# Patient Record
Sex: FEMALE | Race: BLACK OR AFRICAN AMERICAN | Employment: FULL TIME | URBAN - METROPOLITAN AREA
[De-identification: names, ages, dates, MRNs, and addresses within clinical notes are randomized per-mention and may not be internally consistent; named-entity substitution may affect disease eponyms.]

---

## 2018-08-09 ENCOUNTER — TELEPHONE (OUTPATIENT)
Dept: ORTHOPEDIC SURGERY | Age: 53
End: 2018-08-09

## 2018-08-09 ENCOUNTER — OFFICE VISIT (OUTPATIENT)
Dept: ORTHOPEDIC SURGERY | Age: 53
End: 2018-08-09

## 2018-08-09 VITALS
HEIGHT: 66 IN | HEART RATE: 88 BPM | BODY MASS INDEX: 42.59 KG/M2 | DIASTOLIC BLOOD PRESSURE: 74 MMHG | SYSTOLIC BLOOD PRESSURE: 102 MMHG | WEIGHT: 265 LBS

## 2018-08-09 DIAGNOSIS — M17.0 PRIMARY OSTEOARTHRITIS OF BOTH KNEES: Primary | ICD-10-CM

## 2018-08-09 DIAGNOSIS — M22.41 CHONDROMALACIA OF BOTH PATELLAE: ICD-10-CM

## 2018-08-09 DIAGNOSIS — M25.562 PAIN IN BOTH KNEES, UNSPECIFIED CHRONICITY: Primary | ICD-10-CM

## 2018-08-09 DIAGNOSIS — M17.0 PRIMARY OSTEOARTHRITIS OF BOTH KNEES: ICD-10-CM

## 2018-08-09 DIAGNOSIS — M22.42 CHONDROMALACIA OF BOTH PATELLAE: ICD-10-CM

## 2018-08-09 DIAGNOSIS — M25.561 PAIN IN BOTH KNEES, UNSPECIFIED CHRONICITY: Primary | ICD-10-CM

## 2018-08-09 PROCEDURE — G8417 CALC BMI ABV UP PARAM F/U: HCPCS | Performed by: FAMILY MEDICINE

## 2018-08-09 PROCEDURE — L3170 FOOT PLAS HEEL STABI PRE OTS: HCPCS | Performed by: FAMILY MEDICINE

## 2018-08-09 PROCEDURE — 20610 DRAIN/INJ JOINT/BURSA W/O US: CPT | Performed by: FAMILY MEDICINE

## 2018-08-09 PROCEDURE — 99243 OFF/OP CNSLTJ NEW/EST LOW 30: CPT | Performed by: FAMILY MEDICINE

## 2018-08-09 PROCEDURE — G8427 DOCREV CUR MEDS BY ELIG CLIN: HCPCS | Performed by: FAMILY MEDICINE

## 2018-08-09 PROCEDURE — 3017F COLORECTAL CA SCREEN DOC REV: CPT | Performed by: FAMILY MEDICINE

## 2018-08-09 RX ORDER — FLUTICASONE PROPIONATE 50 MCG
2 SPRAY, SUSPENSION (ML) NASAL DAILY
COMMUNITY
Start: 2018-08-01

## 2018-08-09 RX ORDER — HYDROCHLOROTHIAZIDE 12.5 MG/1
12.5 TABLET ORAL DAILY
COMMUNITY
Start: 2018-08-08

## 2018-08-09 RX ORDER — LORATADINE 10 MG/1
TABLET ORAL
Refills: 3 | COMMUNITY
Start: 2018-06-27

## 2018-08-09 RX ORDER — METHYLPREDNISOLONE 4 MG/1
TABLET ORAL
Qty: 21 KIT | Refills: 0 | Status: CANCELLED | OUTPATIENT
Start: 2018-08-09

## 2018-08-09 RX ORDER — LISINOPRIL 10 MG/1
TABLET ORAL
Refills: 1 | COMMUNITY
Start: 2018-07-30

## 2018-08-09 RX ORDER — POTASSIUM CHLORIDE 1500 MG/1
TABLET, FILM COATED, EXTENDED RELEASE ORAL
Refills: 3 | COMMUNITY
Start: 2018-07-30 | End: 2019-01-04 | Stop reason: ALTCHOICE

## 2018-08-09 RX ORDER — DICLOFENAC SODIUM 75 MG/1
75 TABLET, DELAYED RELEASE ORAL 2 TIMES DAILY
Qty: 60 TABLET | Refills: 3 | Status: ON HOLD | OUTPATIENT
Start: 2018-08-09 | End: 2019-01-08 | Stop reason: HOSPADM

## 2018-08-09 RX ORDER — MELOXICAM 15 MG/1
TABLET ORAL
Refills: 0 | COMMUNITY
Start: 2018-08-01 | End: 2019-01-04 | Stop reason: ALTCHOICE

## 2018-08-09 NOTE — TELEPHONE ENCOUNTER
08/09/2018:   TALHA    -  NO PRECERT REQUIRED - PER PRAKASH WITH [facility]  REF #  [id number] NDS/MS    DEDUCTIBLE IN NETWORK INDIVIDUAL $2500, %;    IN NETWORK FAMILY, $5000, MET 50%

## 2018-08-09 NOTE — PROGRESS NOTES
allergies, past medical, surgical, social and family histories were reviewed and updated as appropriate. Review of Systems  Pertinent items are noted in HPI  Review of systems reviewed from Patient History Form dated 8/9/2018  and available in the patient's chart under the Media tab. Vital Signs  Vitals:    08/09/18 1149   BP: 102/74   Pulse: 88       General Exam:     Constitutional: Patient is adequately groomed with no evidence of malnutrition  DTRs: Deep tendon reflexes are intact  Mental Status: The patient is oriented to time, place and person. The patient's mood and affect are appropriate. Lymphatic: The lymphatic examination bilaterally reveals all areas to be without enlargement or induration. Vascular: Examination reveals no swelling or calf tenderness. Peripheral pulses are palpable and 2+. Neurological: The patient has good coordination. There is no weakness or sensory deficit. Knee Examination  Inspection:  She is in mild varus bilaterally. She does have trace knee joint effusions bilaterally. Palpation:  She does have tenderness diffusely along the medial joint line right greater than left. She does have tenderness over the medial and lateral joint line and pain with patellar grind testing. Rang of Motion:  She does lack about 20° active extension on the right and about 5-10° on the left. Flexion limited to about 100 on the right and 1:15 on the left. Strength:  4/5 with flexion and extension. Special Tests:  She does have pain patellar grind testing record of the left pain with crepitation with medial Denver's. No high-grade instability symptoms and a negative screening hip testing. Skin: There are no rashes, ulcerations or lesions. Distal neurovascular exam is intact.     Gait: Mild altalgia    Reflex symmetrically preserved    Additional Comments:     Additional Examinations:  Right Lower Extremity: Examination of the right lower extremity does not show any tenderness, deformity or injury. Range of motion is unremarkable. There is no gross instability. There are no rashes, ulcerations or lesions. Strength and tone are normal.  Left Lower Extremity: Examination of the left lower extremity does not show any tenderness, deformity or injury. Range of motion is unremarkable. There is no gross instability. There are no rashes, ulcerations or lesions. Strength and tone are normal.  Examination of the bilateral hip reveals intact skin. The patient demonstrates full painless range of motion with regards to flexion, abduction, internal and external rotation. There is not tenderness about the greater trochanter. There is a negative straight leg raise against resistance. Strength is 5/5 thorough out all planes. Diagnostic Test Findings: Bilateral knee AP and PA weight-bearing sunrise and lateral films were obtained today and show advanced bone-on-bone changes to the medial compartments bilaterally with patellofemoral arthropathy. She does have fairly prominent subchondral cyst formation on the right to the distal femur and tibia. Assessment :  #1. Progressively worsening right greater than left knee aggravation end-stage medial compartment osteoarthritis with patellofemoral arthropathy and synovitis    Impression:  Encounter Diagnoses   Name Primary?     Pain in both knees, unspecified chronicity Yes    Primary osteoarthritis of both knees     Chondromalacia of both patellae        Office Procedures:  Orders Placed This Encounter   Procedures    XR KNEE RIGHT (MIN 4 VIEWS)    Ambulatory referral to Physical Therapy     Referral Priority:   Routine     Referral Type:   Eval and Treat     Referral Reason:   Specialty Services Required     Requested Specialty:   Physical Therapy     Number of Visits Requested:   1    WI ARTHROCENTESIS ASPIR&/INJ MAJOR JT/BURSA W/O US    WI BETAMETHASONE ACET&SOD PHOSP    Visco K Heel Shoe Inserts     Patient was prescribed a Visco K Heel Shoe Insert. The bilateral feet will require protection / support from this orthosis to improve their function. The orthosis will assist in protecting the affected area, provide functional support and facilitate healing. The patient was educated and fit by a healthcare professional with expert knowledge and specialization in brace application while under the direct supervision of the treating physician. Verbal and written instructions for the use of and application of this item were provided. They were instructed to contact the office immediately should the brace result in increased pain, decreased sensation, increased swelling or worsening of the condition. Treatment Plan:  Treatment options were discussed with Maddison Pryor. We did review her plain films and exam findings. She does have very pronounced medial compartment osteoarthritis as well as patellofemoral arthropathy and is much more symptomatically on the right. We did not inject her knees bilaterally today after discussion using 2 mL of Celestone, 2 of Marcaine, 1 of Xylocaine. She was measured for a medial compartment  for her more symptomatic right knee. I like her to continue with [facility] with Deana Haider PT.  she was changed to diclofenac 75 mg 1 twice daily and was given lateral heel wedges although she may be a candidate for custom orthotics that she is at diet-controlled diabetic. We did give her information regarding Visco supplementation as well. She is aware that she will likely need knee arthroplasty down the road but she is fairly young at 48. See her back in a few weeks for follow-up. Cc:  Mark Esqueda CNP      This dictation was performed with a verbal recognition program (DRAGON) and it was checked for errors. It is possible that there are still dictated errors within this office note.  If so, please bring any errors to my attention

## 2018-08-16 ENCOUNTER — TELEPHONE (OUTPATIENT)
Dept: PHYSICAL THERAPY | Age: 53
End: 2018-08-16

## 2018-08-23 ENCOUNTER — TELEPHONE (OUTPATIENT)
Dept: ORTHOPEDIC SURGERY | Age: 53
End: 2018-08-23

## 2018-08-27 ENCOUNTER — TELEPHONE (OUTPATIENT)
Dept: ORTHOPEDIC SURGERY | Age: 53
End: 2018-08-27

## 2018-09-06 ENCOUNTER — OFFICE VISIT (OUTPATIENT)
Dept: ORTHOPEDIC SURGERY | Age: 53
End: 2018-09-06

## 2018-09-06 VITALS
SYSTOLIC BLOOD PRESSURE: 101 MMHG | BODY MASS INDEX: 40.81 KG/M2 | HEIGHT: 67 IN | HEART RATE: 80 BPM | DIASTOLIC BLOOD PRESSURE: 72 MMHG | WEIGHT: 260 LBS

## 2018-09-06 DIAGNOSIS — M22.42 CHONDROMALACIA OF BOTH PATELLAE: ICD-10-CM

## 2018-09-06 DIAGNOSIS — M22.41 CHONDROMALACIA OF BOTH PATELLAE: ICD-10-CM

## 2018-09-06 DIAGNOSIS — M25.561 PAIN IN BOTH KNEES, UNSPECIFIED CHRONICITY: ICD-10-CM

## 2018-09-06 DIAGNOSIS — M17.0 PRIMARY OSTEOARTHRITIS OF BOTH KNEES: Primary | ICD-10-CM

## 2018-09-06 DIAGNOSIS — M17.0 PRIMARY OSTEOARTHRITIS OF BOTH KNEES: ICD-10-CM

## 2018-09-06 DIAGNOSIS — M25.562 PAIN IN BOTH KNEES, UNSPECIFIED CHRONICITY: ICD-10-CM

## 2018-09-06 PROCEDURE — 1036F TOBACCO NON-USER: CPT | Performed by: FAMILY MEDICINE

## 2018-09-06 PROCEDURE — 99213 OFFICE O/P EST LOW 20 MIN: CPT | Performed by: FAMILY MEDICINE

## 2018-09-06 PROCEDURE — L1845 KO DOUBLE UPRIGHT PRE CST: HCPCS | Performed by: FAMILY MEDICINE

## 2018-09-06 PROCEDURE — G8427 DOCREV CUR MEDS BY ELIG CLIN: HCPCS | Performed by: FAMILY MEDICINE

## 2018-09-06 PROCEDURE — 3017F COLORECTAL CA SCREEN DOC REV: CPT | Performed by: FAMILY MEDICINE

## 2018-09-06 PROCEDURE — G8417 CALC BMI ABV UP PARAM F/U: HCPCS | Performed by: FAMILY MEDICINE

## 2018-09-06 NOTE — PROGRESS NOTES
Procedures    EUFLEXXA INJ (For Auth/Precert)     Bilateral Knees     Standing Status:   Future     Standing Expiration Date:   9/6/2019       Treatment Plan:  Treatment options were discussed with Gabrielle Vasquez. We did review her plain films and exam findings. She does have very pronounced medial compartment osteoarthritis as well as patellofemoral arthropathy and is much more symptomatically on the right. Overall she'll and temporary improvement following her steroid injection. We did have a ryne discussion regarding continued conservative treatment versus potential operative intervention. Of concern is that she is fairly young for knee arthroplasty and that trying at least one round of visco supplementation with Euflexxa to her knees bilaterally would likely be advisable as she continues with her therapy exercises. We try to fit her in a medial compartment  however was too small and will need to be reordered. We will try this on the right initially. She will continue with her diclofenac 75 mg 1 pill twice daily with a paternal referral for custom orthotics with lateral posting as she is a diet-controlled diabetic. I will see her back after a few weeks to consider Euflexxa. Icing and activity modification was discussed. She may utilize her previous lateral heel wedges until she gets her custom orthotics. We'll see her back in a few weeks for follow-up. Cc:  Shaila Gilliam CNP      This dictation was performed with a verbal recognition program (DRAGON) and it was checked for errors. It is possible that there are still dictated errors within this office note. If so, please bring any errors to my attention for an addendum. All efforts were made to ensure that this office note is accurate.

## 2018-09-10 ENCOUNTER — TELEPHONE (OUTPATIENT)
Dept: ORTHOPEDIC SURGERY | Age: 53
End: 2018-09-10

## 2018-09-27 ENCOUNTER — OFFICE VISIT (OUTPATIENT)
Dept: ORTHOPEDIC SURGERY | Age: 53
End: 2018-09-27
Payer: COMMERCIAL

## 2018-09-27 VITALS — HEIGHT: 67 IN | WEIGHT: 260 LBS | BODY MASS INDEX: 40.81 KG/M2

## 2018-09-27 DIAGNOSIS — M17.0 PRIMARY OSTEOARTHRITIS OF BOTH KNEES: Primary | ICD-10-CM

## 2018-09-27 DIAGNOSIS — M25.561 PAIN IN BOTH KNEES, UNSPECIFIED CHRONICITY: ICD-10-CM

## 2018-09-27 DIAGNOSIS — M22.41 CHONDROMALACIA OF BOTH PATELLAE: ICD-10-CM

## 2018-09-27 DIAGNOSIS — M22.42 CHONDROMALACIA OF BOTH PATELLAE: ICD-10-CM

## 2018-09-27 DIAGNOSIS — M25.562 PAIN IN BOTH KNEES, UNSPECIFIED CHRONICITY: ICD-10-CM

## 2018-09-27 PROCEDURE — G8427 DOCREV CUR MEDS BY ELIG CLIN: HCPCS | Performed by: FAMILY MEDICINE

## 2018-09-27 PROCEDURE — 99213 OFFICE O/P EST LOW 20 MIN: CPT | Performed by: FAMILY MEDICINE

## 2018-09-27 PROCEDURE — G8417 CALC BMI ABV UP PARAM F/U: HCPCS | Performed by: FAMILY MEDICINE

## 2018-09-27 PROCEDURE — 1036F TOBACCO NON-USER: CPT | Performed by: FAMILY MEDICINE

## 2018-09-27 PROCEDURE — 3017F COLORECTAL CA SCREEN DOC REV: CPT | Performed by: FAMILY MEDICINE

## 2018-09-27 PROCEDURE — 20610 DRAIN/INJ JOINT/BURSA W/O US: CPT | Performed by: FAMILY MEDICINE

## 2018-09-27 NOTE — PROGRESS NOTES
be without enlargement or induration. Vascular: Examination reveals no swelling or calf tenderness. Peripheral pulses are palpable and 2+. Neurological: The patient has good coordination. There is no weakness or sensory deficit. Knee Examination  Inspection:  She is in mild varus bilaterally. She does have trace knee joint effusions bilaterally. Palpation:  She does have ongoing tenderness diffusely along the medial joint line right greater than left. She does have tenderness over the medial and lateral joint line and pain with patellar grind testing. Rang of Motion:  She does lack about 15° active extension on the right and about 5-10° on the left. Flexion limited to about 100 on the right and 115 on the left. Strength:  4/5 with flexion and extension. Special Tests:  She does have ongoing pain patellar grind testing slightly improved pain on the left pain with crepitation with medial Denver's. No high-grade instability symptoms and a negative screening hip testing. Skin: There are no rashes, ulcerations or lesions. Distal neurovascular exam is intact. Gait: Mild altalgia    Reflex symmetrically preserved    Additional Comments:     Additional Examinations:  Right Lower Extremity: Examination of the right lower extremity does not show any tenderness, deformity or injury. Range of motion is unremarkable. There is no gross instability. There are no rashes, ulcerations or lesions. Strength and tone are normal.  Left Lower Extremity: Examination of the left lower extremity does not show any tenderness, deformity or injury. Range of motion is unremarkable. There is no gross instability. There are no rashes, ulcerations or lesions. Strength and tone are normal.  Examination of the bilateral hip reveals intact skin. The patient demonstrates full painless range of motion with regards to flexion, abduction, internal and external rotation.   There is not tenderness about the greater recognition program [facility]) and it was checked for errors. It is possible that there are still dictated errors within this office note. If so, please bring any errors to my attention for an addendum. All efforts were made to ensure that this office note is accurate.

## 2018-10-04 ENCOUNTER — OFFICE VISIT (OUTPATIENT)
Dept: ORTHOPEDIC SURGERY | Age: 53
End: 2018-10-04
Payer: COMMERCIAL

## 2018-10-04 VITALS — HEART RATE: 72 BPM | SYSTOLIC BLOOD PRESSURE: 133 MMHG | DIASTOLIC BLOOD PRESSURE: 84 MMHG

## 2018-10-04 DIAGNOSIS — M22.42 CHONDROMALACIA OF BOTH PATELLAE: ICD-10-CM

## 2018-10-04 DIAGNOSIS — M25.561 PAIN IN BOTH KNEES, UNSPECIFIED CHRONICITY: ICD-10-CM

## 2018-10-04 DIAGNOSIS — M22.41 CHONDROMALACIA OF BOTH PATELLAE: ICD-10-CM

## 2018-10-04 DIAGNOSIS — M17.0 PRIMARY OSTEOARTHRITIS OF BOTH KNEES: Primary | ICD-10-CM

## 2018-10-04 DIAGNOSIS — M25.562 PAIN IN BOTH KNEES, UNSPECIFIED CHRONICITY: ICD-10-CM

## 2018-10-04 PROCEDURE — 20610 DRAIN/INJ JOINT/BURSA W/O US: CPT | Performed by: FAMILY MEDICINE

## 2018-10-04 PROCEDURE — 99999 PR OFFICE/OUTPT VISIT,PROCEDURE ONLY: CPT | Performed by: FAMILY MEDICINE

## 2018-10-11 ENCOUNTER — OFFICE VISIT (OUTPATIENT)
Dept: ORTHOPEDIC SURGERY | Age: 53
End: 2018-10-11
Payer: COMMERCIAL

## 2018-10-11 DIAGNOSIS — M22.41 CHONDROMALACIA OF BOTH PATELLAE: ICD-10-CM

## 2018-10-11 DIAGNOSIS — M25.562 PAIN IN BOTH KNEES, UNSPECIFIED CHRONICITY: ICD-10-CM

## 2018-10-11 DIAGNOSIS — M17.0 PRIMARY OSTEOARTHRITIS OF BOTH KNEES: Primary | ICD-10-CM

## 2018-10-11 DIAGNOSIS — M25.561 PAIN IN BOTH KNEES, UNSPECIFIED CHRONICITY: ICD-10-CM

## 2018-10-11 DIAGNOSIS — M22.42 CHONDROMALACIA OF BOTH PATELLAE: ICD-10-CM

## 2018-10-11 PROCEDURE — 99999 PR OFFICE/OUTPT VISIT,PROCEDURE ONLY: CPT | Performed by: FAMILY MEDICINE

## 2018-10-11 PROCEDURE — 20610 DRAIN/INJ JOINT/BURSA W/O US: CPT | Performed by: FAMILY MEDICINE

## 2018-10-15 ENCOUNTER — TELEPHONE (OUTPATIENT)
Dept: ORTHOPEDIC SURGERY | Age: 53
End: 2018-10-15

## 2018-11-02 DIAGNOSIS — M22.42 CHONDROMALACIA OF BOTH PATELLAE: ICD-10-CM

## 2018-11-02 DIAGNOSIS — M25.562 PAIN IN BOTH KNEES, UNSPECIFIED CHRONICITY: ICD-10-CM

## 2018-11-02 DIAGNOSIS — M17.0 PRIMARY OSTEOARTHRITIS OF BOTH KNEES: Primary | ICD-10-CM

## 2018-11-02 DIAGNOSIS — M25.561 PAIN IN BOTH KNEES, UNSPECIFIED CHRONICITY: ICD-10-CM

## 2018-11-02 DIAGNOSIS — M22.41 CHONDROMALACIA OF BOTH PATELLAE: ICD-10-CM

## 2018-11-02 RX ORDER — METHYLPREDNISOLONE 4 MG/1
TABLET ORAL
Qty: 21 KIT | Refills: 0 | Status: SHIPPED | OUTPATIENT
Start: 2018-11-02 | End: 2019-01-04 | Stop reason: ALTCHOICE

## 2018-11-20 DIAGNOSIS — M22.41 CHONDROMALACIA OF BOTH PATELLAE: ICD-10-CM

## 2018-11-20 DIAGNOSIS — M25.562 PAIN IN BOTH KNEES, UNSPECIFIED CHRONICITY: ICD-10-CM

## 2018-11-20 DIAGNOSIS — M17.0 PRIMARY OSTEOARTHRITIS OF BOTH KNEES: Primary | ICD-10-CM

## 2018-11-20 DIAGNOSIS — M22.42 CHONDROMALACIA OF BOTH PATELLAE: ICD-10-CM

## 2018-11-20 DIAGNOSIS — M25.561 PAIN IN BOTH KNEES, UNSPECIFIED CHRONICITY: ICD-10-CM

## 2018-12-03 ENCOUNTER — TELEPHONE (OUTPATIENT)
Dept: ORTHOPEDIC SURGERY | Age: 53
End: 2018-12-03

## 2018-12-06 ENCOUNTER — OFFICE VISIT (OUTPATIENT)
Dept: ORTHOPEDIC SURGERY | Age: 53
End: 2018-12-06
Payer: COMMERCIAL

## 2018-12-06 VITALS
HEIGHT: 67 IN | SYSTOLIC BLOOD PRESSURE: 128 MMHG | BODY MASS INDEX: 40.49 KG/M2 | HEART RATE: 83 BPM | WEIGHT: 258 LBS | DIASTOLIC BLOOD PRESSURE: 86 MMHG

## 2018-12-06 DIAGNOSIS — M22.42 CHONDROMALACIA OF BOTH PATELLAE: ICD-10-CM

## 2018-12-06 DIAGNOSIS — M17.0 PRIMARY OSTEOARTHRITIS OF BOTH KNEES: Primary | ICD-10-CM

## 2018-12-06 DIAGNOSIS — M25.561 PAIN IN BOTH KNEES, UNSPECIFIED CHRONICITY: ICD-10-CM

## 2018-12-06 DIAGNOSIS — M22.41 CHONDROMALACIA OF BOTH PATELLAE: ICD-10-CM

## 2018-12-06 DIAGNOSIS — M25.562 PAIN IN BOTH KNEES, UNSPECIFIED CHRONICITY: ICD-10-CM

## 2018-12-06 PROCEDURE — 1036F TOBACCO NON-USER: CPT | Performed by: FAMILY MEDICINE

## 2018-12-06 PROCEDURE — G8427 DOCREV CUR MEDS BY ELIG CLIN: HCPCS | Performed by: FAMILY MEDICINE

## 2018-12-06 PROCEDURE — E0100 CANE ADJUST/FIXED WITH TIP: HCPCS | Performed by: FAMILY MEDICINE

## 2018-12-06 PROCEDURE — 99213 OFFICE O/P EST LOW 20 MIN: CPT | Performed by: FAMILY MEDICINE

## 2018-12-06 PROCEDURE — 3017F COLORECTAL CA SCREEN DOC REV: CPT | Performed by: FAMILY MEDICINE

## 2018-12-06 PROCEDURE — G8417 CALC BMI ABV UP PARAM F/U: HCPCS | Performed by: FAMILY MEDICINE

## 2018-12-06 PROCEDURE — G8484 FLU IMMUNIZE NO ADMIN: HCPCS | Performed by: FAMILY MEDICINE

## 2018-12-06 RX ORDER — TRAMADOL HYDROCHLORIDE 50 MG/1
50 TABLET ORAL EVERY 12 HOURS PRN
Qty: 14 TABLET | Refills: 0 | Status: SHIPPED | OUTPATIENT
Start: 2018-12-06 | End: 2018-12-13

## 2018-12-08 NOTE — PROGRESS NOTES
Chief Complaint  Knee Pain (Bilateral Knee pain)       Follow-up symptomatic right greater than left knee advanced medial compartment osteoarthritis with patellofemoral arthropathy and persistent synovitis status post completion of bilateral knee Euflexxa 10/11/2018     History of Present Illness:  Ita Angeles is a 48 y.o. 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 She states that she has been having progressively worsening pain over the past 18 months or so to her knees right greater than left. There is no history of injury or new activity prior to becoming symptomatic. This is a strong family history of arthritis in both her mom and her brother who have had to have bilateral knee replacements in their 46s. There is no history of actual injury and activity prior to becoming increasingly symptomatic. Prolonged standing and walking greater than 4-5 hours will cause worsening pain as is going up and down inclines. She is unable to squat and kneel and has noticed progressive motion deficits developing. She initially tried icing and ibuprofen but was recently started in physical therapy and has been tried on what sounds like Naprosyn and Motrin and meloxicam with limited effectiveness. She has had symptoms of pseudo-buckling popping and crepitation not really complaining of active locking or catching. For the most part her pain is achy in the range of 3-4-10 but can be much more sharp at 7-8 out of 10 with distance walking going up and downstairs. She does have occasional night pain. She recently saw her nurse practitioner who recommended therapy which she just started about a week ago.   She reports no substantial back pain groin pain or radicular symptoms and has

## 2018-12-12 ENCOUNTER — OFFICE VISIT (OUTPATIENT)
Dept: ORTHOPEDIC SURGERY | Age: 53
End: 2018-12-12
Payer: COMMERCIAL

## 2018-12-12 VITALS — HEART RATE: 79 BPM | SYSTOLIC BLOOD PRESSURE: 127 MMHG | DIASTOLIC BLOOD PRESSURE: 93 MMHG

## 2018-12-12 DIAGNOSIS — M17.11 PRIMARY OSTEOARTHRITIS OF RIGHT KNEE: Primary | ICD-10-CM

## 2018-12-12 DIAGNOSIS — M17.12 PRIMARY OSTEOARTHRITIS OF LEFT KNEE: ICD-10-CM

## 2018-12-12 DIAGNOSIS — Z01.818 PRE-OP TESTING: ICD-10-CM

## 2018-12-12 PROCEDURE — 99243 OFF/OP CNSLTJ NEW/EST LOW 30: CPT | Performed by: ORTHOPAEDIC SURGERY

## 2018-12-12 PROCEDURE — G8427 DOCREV CUR MEDS BY ELIG CLIN: HCPCS | Performed by: ORTHOPAEDIC SURGERY

## 2018-12-12 PROCEDURE — G8417 CALC BMI ABV UP PARAM F/U: HCPCS | Performed by: ORTHOPAEDIC SURGERY

## 2018-12-12 PROCEDURE — 3017F COLORECTAL CA SCREEN DOC REV: CPT | Performed by: ORTHOPAEDIC SURGERY

## 2018-12-12 PROCEDURE — G8484 FLU IMMUNIZE NO ADMIN: HCPCS | Performed by: ORTHOPAEDIC SURGERY

## 2018-12-12 RX ORDER — HYDROCODONE BITARTRATE AND ACETAMINOPHEN 5; 325 MG/1; MG/1
1 TABLET ORAL 2 TIMES DAILY PRN
Qty: 20 TABLET | Refills: 0 | Status: SHIPPED | OUTPATIENT
Start: 2018-12-12 | End: 2018-12-24 | Stop reason: SDUPTHER

## 2018-12-12 NOTE — PROGRESS NOTES
Zayda Mcclure  [id number]  December 12, 2018    Chief Complaint   Patient presents with    Knee Pain     bilateral knee pain, R > L       History: The patient is a 60-year-old female here today for evaluation regarding her bilateral, right greater than left knee pain. The patient has been under the care of Dr. Karlee Lewis for the last few months. She has tried Euflexxa injections, steroid injections, Motrin, naproxen, meloxicam, and a formal therapy program without much improvement of her knee pain. She works as an STNA on third shift. She is on her feet for much of her day. She has found that this knee pain has become right progressive and severe. It limits her ability to perform her normal daily functions. She rates her right knee pain 9/10 today in her left knee pain 6/10 today. She has been using tramadol without much relief recently. This is a consult for definitive surgical treatment for her bilateral knee pain from Dr. Nat Lockwood. The patient's  past medical history, medications, allergies,  family history, social history, and have been reviewed, and dated and are recorded in the chart. Pertinent items are noted in HPI. Review of systems reviewed from Pertinent History Form is available in the patient's chart under the Media tab. Vitals:  BP (!) 127/93   Pulse 79     Physical: Ms. Zayda Mcclure appears well, she is in no apparent distress, she demonstrates appropriate mood & affect. She is alert and oriented to person, place and time. Examination of the right lower extremity reveals no pain with range of motion of the hip. She has generalized tenderness to palpation about the joint line of the right knee. Range of motion is from -7 degrees to 110 degrees. Strength is 4+ to 5/5 for all muscle groups about the right knee. There is patellofemoral crepitus with range of motion of the right knee. Varus and valgus stressing of the knees reveals no evidence of instability.  There is a small effusion in the right knee. Anterior drawer and Lachman are negative bilaterally. Examination of the skin reveals no rashes, ulceration, or lesion, bilaterally in the lower extremities. Sensation to both lower extremities is grossly intact. Exam of both feet reveals pedal pulses intact and brisk cap refill. Patient is able to dorsiflex and wiggle all toes. Deep tendon reflexes of the lower extremities are normal and symmetric. Imagin views of the right knee obtained on 18 reviewed in the office today. There is evidence that this, and stage osteoarthritis of the right knee and there is complete loss of medial joint compartment with bone-on-bone changes medially and within the patellofemoral compartment. There are cystic changes noted as well. Regressive the left knee obtained previously were nonweightbearing and show at least moderate to severe osteoarthritis of the left knee. Impression: #1 bilateral knee osteoarthritis    Plan: At this time, the patient will be scheduled for a right total knee arthroplasty. All risks including but not limited to blood loss, infection, persistent pain,stiffness, weakness,loosening,deep vein thrombosis,neurovascular injury, and the risks of anesthesia were discussed. The patient understands all risks and benefits of the procedure and agrees to proceed. The patient will see her primary care Chema Loza RN, for medical clearance. If the patient is on NSAIDS or blood thinners these medications will need to be discontinued one week prior to surgery. She was given a small prescription for Norco.  Will plan on 81mg ASA BID for 14 days post-op.

## 2018-12-13 ENCOUNTER — TELEPHONE (OUTPATIENT)
Dept: ORTHOPEDIC SURGERY | Age: 53
End: 2018-12-13

## 2018-12-13 NOTE — TELEPHONE ENCOUNTER
Patient called back requesting that work letter be faxed to her employer and mailed to her home.     Fax: [phone]

## 2018-12-19 ENCOUNTER — PREP FOR PROCEDURE (OUTPATIENT)
Dept: ORTHOPEDIC SURGERY | Age: 53
End: 2018-12-19

## 2018-12-19 ENCOUNTER — TELEPHONE (OUTPATIENT)
Dept: ORTHOPEDIC SURGERY | Age: 53
End: 2018-12-19

## 2018-12-19 DIAGNOSIS — M17.11 PRIMARY OSTEOARTHRITIS OF RIGHT KNEE: Primary | ICD-10-CM

## 2018-12-20 ENCOUNTER — PREP FOR PROCEDURE (OUTPATIENT)
Dept: ORTHOPEDICS UNIT | Age: 53
End: 2018-12-20

## 2018-12-21 RX ORDER — CELECOXIB 200 MG/1
400 CAPSULE ORAL ONCE
Status: CANCELLED | OUTPATIENT
Start: 2018-12-21 | End: 2018-12-21

## 2018-12-24 ENCOUNTER — TELEPHONE (OUTPATIENT)
Dept: ORTHOPEDIC SURGERY | Age: 53
End: 2018-12-24

## 2018-12-24 DIAGNOSIS — M17.11 PRIMARY OSTEOARTHRITIS OF RIGHT KNEE: ICD-10-CM

## 2018-12-24 RX ORDER — HYDROCODONE BITARTRATE AND ACETAMINOPHEN 5; 325 MG/1; MG/1
1 TABLET ORAL 2 TIMES DAILY PRN
Qty: 30 TABLET | Refills: 0 | Status: SHIPPED | OUTPATIENT
Start: 2018-12-24 | End: 2019-01-04 | Stop reason: ALTCHOICE

## 2018-12-24 NOTE — LETTER
[facility]  [address] [place] [address]  Phone: [phone]  Fax: [phone]    Kathleen Yepez MD        December 24, 2018     Patient: Summer Heart   YOB: 1965   Date of Visit: 12/24/2018       To Whom It May Concern: It is my medical opinion that Otto Meeks was off work from 12/22/18 until 12/26/18    If you have any questions or concerns, please don't hesitate to call.     Sincerely,        Kathleen Yepez MD

## 2018-12-24 NOTE — TELEPHONE ENCOUNTER
She was given a refill - last prior to surgery. She can have note to remain out of work untill surgery.

## 2018-12-26 ENCOUNTER — TELEPHONE (OUTPATIENT)
Dept: ORTHOPEDIC SURGERY | Age: 53
End: 2018-12-26

## 2018-12-26 ENCOUNTER — HOSPITAL ENCOUNTER (OUTPATIENT)
Dept: PREADMISSION TESTING | Age: 53
Discharge: HOME | End: 2018-12-30
Payer: COMMERCIAL

## 2018-12-26 DIAGNOSIS — Z01.818 PRE-OP TESTING: ICD-10-CM

## 2018-12-26 DIAGNOSIS — M17.11 PRIMARY OSTEOARTHRITIS OF RIGHT KNEE: ICD-10-CM

## 2018-12-26 LAB
ABO/RH: NORMAL
ALBUMIN SERPL-MCNC: 4.1 G/DL (ref 3.4–5)
ANION GAP SERPL CALCULATED.3IONS-SCNC: 11 MMOL/L (ref 3–16)
ANTIBODY SCREEN: NORMAL
BASOPHILS ABSOLUTE: 0 K/UL (ref 0–0.2)
BASOPHILS RELATIVE PERCENT: 0.3 %
BILIRUBIN URINE: NEGATIVE
BLOOD, URINE: NEGATIVE
BUN BLDV-MCNC: 16 MG/DL (ref 7–20)
CALCIUM SERPL-MCNC: 9.7 MG/DL (ref 8.3–10.6)
CHLORIDE BLD-SCNC: 103 MMOL/L (ref 99–110)
CLARITY: CLEAR
CO2: 27 MMOL/L (ref 21–32)
COLOR: YELLOW
CREAT SERPL-MCNC: 0.9 MG/DL (ref 0.6–1.1)
EOSINOPHILS ABSOLUTE: 0.1 K/UL (ref 0–0.6)
EOSINOPHILS RELATIVE PERCENT: 2.8 %
ESTIMATED AVERAGE GLUCOSE: 116.9 MG/DL
GFR AFRICAN AMERICAN: >60
GFR NON-AFRICAN AMERICAN: >60
GLUCOSE BLD-MCNC: 81 MG/DL (ref 70–99)
GLUCOSE URINE: NEGATIVE MG/DL
HBA1C MFR BLD: 5.7 %
HCT VFR BLD CALC: 37 % (ref 36–48)
HEMOGLOBIN: 12.1 G/DL (ref 12–16)
INR BLD: 1.11 (ref 0.86–1.14)
KETONES, URINE: NEGATIVE MG/DL
LEUKOCYTE ESTERASE, URINE: NEGATIVE
LYMPHOCYTES ABSOLUTE: 1.5 K/UL (ref 1–5.1)
LYMPHOCYTES RELATIVE PERCENT: 43 %
MCH RBC QN AUTO: 28.9 PG (ref 26–34)
MCHC RBC AUTO-ENTMCNC: 32.8 G/DL (ref 31–36)
MCV RBC AUTO: 88.1 FL (ref 80–100)
MICROSCOPIC EXAMINATION: NORMAL
MONOCYTES ABSOLUTE: 0.3 K/UL (ref 0–1.3)
MONOCYTES RELATIVE PERCENT: 9.2 %
NEUTROPHILS ABSOLUTE: 1.6 K/UL (ref 1.7–7.7)
NEUTROPHILS RELATIVE PERCENT: 44.7 %
NITRITE, URINE: NEGATIVE
PDW BLD-RTO: 13.8 % (ref 12.4–15.4)
PH UA: 5.5
PLATELET # BLD: 245 K/UL (ref 135–450)
PMV BLD AUTO: 8.3 FL (ref 5–10.5)
POTASSIUM SERPL-SCNC: 3.7 MMOL/L (ref 3.5–5.1)
PROTEIN UA: NEGATIVE MG/DL
PROTHROMBIN TIME: 12.6 SEC (ref 9.8–13)
RBC # BLD: 4.2 M/UL (ref 4–5.2)
SODIUM BLD-SCNC: 141 MMOL/L (ref 136–145)
SPECIFIC GRAVITY UA: 1.02
URINE REFLEX TO CULTURE: NORMAL
URINE TYPE: NORMAL
UROBILINOGEN, URINE: 0.2 E.U./DL
WBC # BLD: 3.6 K/UL (ref 4–11)

## 2018-12-26 PROCEDURE — 86900 BLOOD TYPING SEROLOGIC ABO: CPT

## 2018-12-26 PROCEDURE — 93005 ELECTROCARDIOGRAM TRACING: CPT

## 2018-12-26 PROCEDURE — 83036 HEMOGLOBIN GLYCOSYLATED A1C: CPT

## 2018-12-26 PROCEDURE — 85025 COMPLETE CBC W/AUTO DIFF WBC: CPT

## 2018-12-26 PROCEDURE — 82040 ASSAY OF SERUM ALBUMIN: CPT

## 2018-12-26 PROCEDURE — 85610 PROTHROMBIN TIME: CPT

## 2018-12-26 PROCEDURE — 80048 BASIC METABOLIC PNL TOTAL CA: CPT

## 2018-12-26 PROCEDURE — 81003 URINALYSIS AUTO W/O SCOPE: CPT

## 2018-12-26 PROCEDURE — 86850 RBC ANTIBODY SCREEN: CPT

## 2018-12-26 PROCEDURE — 87641 MR-STAPH DNA AMP PROBE: CPT

## 2018-12-26 PROCEDURE — 86901 BLOOD TYPING SEROLOGIC RH(D): CPT

## 2018-12-26 NOTE — TELEPHONE ENCOUNTER
I spoke with patient she wanted to be off work until after the surgery. A new notes was faxed to her work miguel her off work until 4/8/19 estimated .

## 2018-12-27 LAB
EKG ATRIAL RATE: 70 BPM
EKG DIAGNOSIS: NORMAL
EKG P AXIS: 45 DEGREES
EKG P-R INTERVAL: 176 MS
EKG Q-T INTERVAL: 396 MS
EKG QRS DURATION: 82 MS
EKG QTC CALCULATION (BAZETT): 427 MS
EKG R AXIS: 21 DEGREES
EKG T AXIS: 34 DEGREES
EKG VENTRICULAR RATE: 70 BPM
MRSA SCREEN RT-PCR: NORMAL

## 2019-01-03 ENCOUNTER — ANESTHESIA EVENT (OUTPATIENT)
Dept: OPERATING ROOM | Age: 54
DRG: 470 | End: 2019-01-03
Payer: COMMERCIAL

## 2019-01-04 RX ORDER — FERROUS SULFATE 325(65) MG
325 TABLET ORAL DAILY
COMMUNITY
End: 2021-09-29 | Stop reason: ALTCHOICE

## 2019-01-04 RX ORDER — RANITIDINE 150 MG/1
150 TABLET ORAL DAILY
COMMUNITY
Start: 2018-03-13 | End: 2021-09-29 | Stop reason: ALTCHOICE

## 2019-01-08 ENCOUNTER — ANESTHESIA (OUTPATIENT)
Dept: OPERATING ROOM | Age: 54
DRG: 470 | End: 2019-01-08
Payer: COMMERCIAL

## 2019-01-08 ENCOUNTER — HOSPITAL ENCOUNTER (INPATIENT)
Age: 54
LOS: 1 days | Discharge: NURSING, REHAB OR LONG-TERM CARE | DRG: 470 | End: 2019-01-09
Attending: ORTHOPAEDIC SURGERY | Admitting: ORTHOPAEDIC SURGERY
Payer: COMMERCIAL

## 2019-01-08 ENCOUNTER — APPOINTMENT (OUTPATIENT)
Dept: GENERAL RADIOLOGY | Age: 54
DRG: 470 | End: 2019-01-08
Attending: ORTHOPAEDIC SURGERY
Payer: COMMERCIAL

## 2019-01-08 VITALS
OXYGEN SATURATION: 97 % | TEMPERATURE: 97.5 F | SYSTOLIC BLOOD PRESSURE: 120 MMHG | DIASTOLIC BLOOD PRESSURE: 86 MMHG | RESPIRATION RATE: 16 BRPM

## 2019-01-08 DIAGNOSIS — M17.11 PRIMARY OSTEOARTHRITIS OF RIGHT KNEE: Primary | ICD-10-CM

## 2019-01-08 LAB
ABO/RH: NORMAL
ANTIBODY SCREEN: NORMAL
GLUCOSE BLD-MCNC: 123 MG/DL (ref 70–99)
GLUCOSE BLD-MCNC: 182 MG/DL (ref 70–99)
HCT VFR BLD CALC: 34.9 % (ref 36–48)
HEMOGLOBIN: 11.3 G/DL (ref 12–16)
PERFORMED ON: ABNORMAL
PERFORMED ON: ABNORMAL

## 2019-01-08 PROCEDURE — 73560 X-RAY EXAM OF KNEE 1 OR 2: CPT

## 2019-01-08 PROCEDURE — 2500000003 HC RX 250 WO HCPCS: Performed by: NURSE ANESTHETIST, CERTIFIED REGISTERED

## 2019-01-08 PROCEDURE — 3700000001 HC ADD 15 MINUTES (ANESTHESIA): Performed by: ORTHOPAEDIC SURGERY

## 2019-01-08 PROCEDURE — 6360000002 HC RX W HCPCS: Performed by: NURSE PRACTITIONER

## 2019-01-08 PROCEDURE — 2500000003 HC RX 250 WO HCPCS: Performed by: ORTHOPAEDIC SURGERY

## 2019-01-08 PROCEDURE — 7100000000 HC PACU RECOVERY - FIRST 15 MIN: Performed by: ORTHOPAEDIC SURGERY

## 2019-01-08 PROCEDURE — 7100000010 HC PHASE II RECOVERY - FIRST 15 MIN: Performed by: ORTHOPAEDIC SURGERY

## 2019-01-08 PROCEDURE — 36415 COLL VENOUS BLD VENIPUNCTURE: CPT

## 2019-01-08 PROCEDURE — 6370000000 HC RX 637 (ALT 250 FOR IP): Performed by: NURSE PRACTITIONER

## 2019-01-08 PROCEDURE — 3600000015 HC SURGERY LEVEL 5 ADDTL 15MIN: Performed by: ORTHOPAEDIC SURGERY

## 2019-01-08 PROCEDURE — G8988 SELF CARE GOAL STATUS: HCPCS

## 2019-01-08 PROCEDURE — G8978 MOBILITY CURRENT STATUS: HCPCS

## 2019-01-08 PROCEDURE — S0028 INJECTION, FAMOTIDINE, 20 MG: HCPCS | Performed by: NURSE PRACTITIONER

## 2019-01-08 PROCEDURE — 97166 OT EVAL MOD COMPLEX 45 MIN: CPT

## 2019-01-08 PROCEDURE — C1713 ANCHOR/SCREW BN/BN,TIS/BN: HCPCS | Performed by: ORTHOPAEDIC SURGERY

## 2019-01-08 PROCEDURE — 2580000003 HC RX 258: Performed by: NURSE PRACTITIONER

## 2019-01-08 PROCEDURE — 97162 PT EVAL MOD COMPLEX 30 MIN: CPT

## 2019-01-08 PROCEDURE — 7100000001 HC PACU RECOVERY - ADDTL 15 MIN: Performed by: ORTHOPAEDIC SURGERY

## 2019-01-08 PROCEDURE — 6370000000 HC RX 637 (ALT 250 FOR IP): Performed by: ORTHOPAEDIC SURGERY

## 2019-01-08 PROCEDURE — 2580000003 HC RX 258: Performed by: ORTHOPAEDIC SURGERY

## 2019-01-08 PROCEDURE — 97535 SELF CARE MNGMENT TRAINING: CPT

## 2019-01-08 PROCEDURE — 0SRC0J9 REPLACEMENT OF RIGHT KNEE JOINT WITH SYNTHETIC SUBSTITUTE, CEMENTED, OPEN APPROACH: ICD-10-PCS | Performed by: ORTHOPAEDIC SURGERY

## 2019-01-08 PROCEDURE — 1200000000 HC SEMI PRIVATE

## 2019-01-08 PROCEDURE — 85014 HEMATOCRIT: CPT

## 2019-01-08 PROCEDURE — 86850 RBC ANTIBODY SCREEN: CPT

## 2019-01-08 PROCEDURE — 6360000002 HC RX W HCPCS: Performed by: ANESTHESIOLOGY

## 2019-01-08 PROCEDURE — 88305 TISSUE EXAM BY PATHOLOGIST: CPT

## 2019-01-08 PROCEDURE — 7100000011 HC PHASE II RECOVERY - ADDTL 15 MIN: Performed by: ORTHOPAEDIC SURGERY

## 2019-01-08 PROCEDURE — 83036 HEMOGLOBIN GLYCOSYLATED A1C: CPT

## 2019-01-08 PROCEDURE — 86900 BLOOD TYPING SEROLOGIC ABO: CPT

## 2019-01-08 PROCEDURE — 6360000002 HC RX W HCPCS: Performed by: NURSE ANESTHETIST, CERTIFIED REGISTERED

## 2019-01-08 PROCEDURE — G8979 MOBILITY GOAL STATUS: HCPCS

## 2019-01-08 PROCEDURE — 2720000010 HC SURG SUPPLY STERILE: Performed by: ORTHOPAEDIC SURGERY

## 2019-01-08 PROCEDURE — 2580000003 HC RX 258: Performed by: ANESTHESIOLOGY

## 2019-01-08 PROCEDURE — 2709999900 HC NON-CHARGEABLE SUPPLY: Performed by: ORTHOPAEDIC SURGERY

## 2019-01-08 PROCEDURE — G8987 SELF CARE CURRENT STATUS: HCPCS

## 2019-01-08 PROCEDURE — 88311 DECALCIFY TISSUE: CPT

## 2019-01-08 PROCEDURE — 86901 BLOOD TYPING SEROLOGIC RH(D): CPT

## 2019-01-08 PROCEDURE — 97530 THERAPEUTIC ACTIVITIES: CPT

## 2019-01-08 PROCEDURE — 6360000002 HC RX W HCPCS: Performed by: ORTHOPAEDIC SURGERY

## 2019-01-08 PROCEDURE — 3600000005 HC SURGERY LEVEL 5 BASE: Performed by: ORTHOPAEDIC SURGERY

## 2019-01-08 PROCEDURE — 3700000000 HC ANESTHESIA ATTENDED CARE: Performed by: ORTHOPAEDIC SURGERY

## 2019-01-08 PROCEDURE — C1776 JOINT DEVICE (IMPLANTABLE): HCPCS | Performed by: ORTHOPAEDIC SURGERY

## 2019-01-08 PROCEDURE — 85018 HEMOGLOBIN: CPT

## 2019-01-08 DEVICE — PSN TIB STM 5 DEG SZ E R: Type: IMPLANTABLE DEVICE | Site: KNEE | Status: FUNCTIONAL

## 2019-01-08 DEVICE — COMPONENT FEM SZ 9 R KNEE CO CHROM NAR POST STBL CEM: Type: IMPLANTABLE DEVICE | Site: KNEE | Status: FUNCTIONAL

## 2019-01-08 DEVICE — COMPONENT ARTC SURF PS 6-9 EF 10 MM RT TIB KNEE VIVACIT-E: Type: IMPLANTABLE DEVICE | Site: KNEE | Status: FUNCTIONAL

## 2019-01-08 DEVICE — CEMENT BNE 40GM HI VISC RADPQ FOR REV SURG: Type: IMPLANTABLE DEVICE | Site: KNEE | Status: FUNCTIONAL

## 2019-01-08 DEVICE — COMPONENT PAT DIA29MM THK8MM KNEE POLY CEM CONVENTIONAL: Type: IMPLANTABLE DEVICE | Site: KNEE | Status: FUNCTIONAL

## 2019-01-08 RX ORDER — NICOTINE POLACRILEX 4 MG
15 LOZENGE BUCCAL PRN
Status: DISCONTINUED | OUTPATIENT
Start: 2019-01-08 | End: 2019-01-09 | Stop reason: HOSPADM

## 2019-01-08 RX ORDER — CETIRIZINE HYDROCHLORIDE 10 MG/1
10 TABLET ORAL DAILY
Status: DISCONTINUED | OUTPATIENT
Start: 2019-01-09 | End: 2019-01-09 | Stop reason: HOSPADM

## 2019-01-08 RX ORDER — LABETALOL HYDROCHLORIDE 5 MG/ML
INJECTION, SOLUTION INTRAVENOUS PRN
Status: DISCONTINUED | OUTPATIENT
Start: 2019-01-08 | End: 2019-01-08 | Stop reason: SDUPTHER

## 2019-01-08 RX ORDER — HYDRALAZINE HYDROCHLORIDE 20 MG/ML
INJECTION INTRAMUSCULAR; INTRAVENOUS PRN
Status: DISCONTINUED | OUTPATIENT
Start: 2019-01-08 | End: 2019-01-08 | Stop reason: SDUPTHER

## 2019-01-08 RX ORDER — MIDAZOLAM HYDROCHLORIDE 1 MG/ML
INJECTION INTRAMUSCULAR; INTRAVENOUS PRN
Status: DISCONTINUED | OUTPATIENT
Start: 2019-01-08 | End: 2019-01-08 | Stop reason: SDUPTHER

## 2019-01-08 RX ORDER — ONDANSETRON 2 MG/ML
INJECTION INTRAMUSCULAR; INTRAVENOUS PRN
Status: DISCONTINUED | OUTPATIENT
Start: 2019-01-08 | End: 2019-01-08 | Stop reason: SDUPTHER

## 2019-01-08 RX ORDER — POLYETHYLENE GLYCOL 3350 17 G/17G
17 POWDER, FOR SOLUTION ORAL DAILY PRN
Status: DISCONTINUED | OUTPATIENT
Start: 2019-01-08 | End: 2019-01-09 | Stop reason: HOSPADM

## 2019-01-08 RX ORDER — MORPHINE SULFATE 2 MG/ML
2 INJECTION, SOLUTION INTRAMUSCULAR; INTRAVENOUS EVERY 5 MIN PRN
Status: DISCONTINUED | OUTPATIENT
Start: 2019-01-08 | End: 2019-01-08 | Stop reason: HOSPADM

## 2019-01-08 RX ORDER — OXYCODONE HYDROCHLORIDE AND ACETAMINOPHEN 5; 325 MG/1; MG/1
1 TABLET ORAL EVERY 4 HOURS PRN
Status: DISCONTINUED | OUTPATIENT
Start: 2019-01-08 | End: 2019-01-09 | Stop reason: HOSPADM

## 2019-01-08 RX ORDER — SODIUM CHLORIDE 0.9 % (FLUSH) 0.9 %
10 SYRINGE (ML) INJECTION PRN
Status: DISCONTINUED | OUTPATIENT
Start: 2019-01-08 | End: 2019-01-08 | Stop reason: HOSPADM

## 2019-01-08 RX ORDER — DEXAMETHASONE SODIUM PHOSPHATE 4 MG/ML
INJECTION, SOLUTION INTRA-ARTICULAR; INTRALESIONAL; INTRAMUSCULAR; INTRAVENOUS; SOFT TISSUE PRN
Status: DISCONTINUED | OUTPATIENT
Start: 2019-01-08 | End: 2019-01-08 | Stop reason: SDUPTHER

## 2019-01-08 RX ORDER — FENTANYL CITRATE 50 UG/ML
50 INJECTION, SOLUTION INTRAMUSCULAR; INTRAVENOUS EVERY 5 MIN PRN
Status: DISCONTINUED | OUTPATIENT
Start: 2019-01-08 | End: 2019-01-08 | Stop reason: HOSPADM

## 2019-01-08 RX ORDER — DOCUSATE SODIUM 100 MG/1
100 CAPSULE, LIQUID FILLED ORAL 2 TIMES DAILY
Status: DISCONTINUED | OUTPATIENT
Start: 2019-01-08 | End: 2019-01-09 | Stop reason: HOSPADM

## 2019-01-08 RX ORDER — DEXTROSE MONOHYDRATE 25 G/50ML
12.5 INJECTION, SOLUTION INTRAVENOUS PRN
Status: DISCONTINUED | OUTPATIENT
Start: 2019-01-08 | End: 2019-01-09 | Stop reason: HOSPADM

## 2019-01-08 RX ORDER — ONDANSETRON 2 MG/ML
4 INJECTION INTRAMUSCULAR; INTRAVENOUS
Status: DISCONTINUED | OUTPATIENT
Start: 2019-01-08 | End: 2019-01-08 | Stop reason: HOSPADM

## 2019-01-08 RX ORDER — ROCURONIUM BROMIDE 10 MG/ML
INJECTION, SOLUTION INTRAVENOUS PRN
Status: DISCONTINUED | OUTPATIENT
Start: 2019-01-08 | End: 2019-01-08 | Stop reason: SDUPTHER

## 2019-01-08 RX ORDER — OXYCODONE HYDROCHLORIDE 5 MG/1
10 TABLET ORAL PRN
Status: DISCONTINUED | OUTPATIENT
Start: 2019-01-08 | End: 2019-01-08

## 2019-01-08 RX ORDER — CELECOXIB 200 MG/1
400 CAPSULE ORAL ONCE
Status: COMPLETED | OUTPATIENT
Start: 2019-01-08 | End: 2019-01-08

## 2019-01-08 RX ORDER — MORPHINE SULFATE 2 MG/ML
2 INJECTION, SOLUTION INTRAMUSCULAR; INTRAVENOUS
Status: DISCONTINUED | OUTPATIENT
Start: 2019-01-08 | End: 2019-01-09 | Stop reason: HOSPADM

## 2019-01-08 RX ORDER — SODIUM CHLORIDE 9 MG/ML
INJECTION, SOLUTION INTRAVENOUS CONTINUOUS
Status: DISCONTINUED | OUTPATIENT
Start: 2019-01-08 | End: 2019-01-09

## 2019-01-08 RX ORDER — FLUTICASONE PROPIONATE 50 MCG
2 SPRAY, SUSPENSION (ML) NASAL DAILY
Status: DISCONTINUED | OUTPATIENT
Start: 2019-01-10 | End: 2019-01-09 | Stop reason: HOSPADM

## 2019-01-08 RX ORDER — LISINOPRIL 10 MG/1
10 TABLET ORAL DAILY
Status: DISCONTINUED | OUTPATIENT
Start: 2019-01-09 | End: 2019-01-09 | Stop reason: HOSPADM

## 2019-01-08 RX ORDER — DEXTROSE MONOHYDRATE 50 MG/ML
100 INJECTION, SOLUTION INTRAVENOUS PRN
Status: DISCONTINUED | OUTPATIENT
Start: 2019-01-08 | End: 2019-01-09 | Stop reason: HOSPADM

## 2019-01-08 RX ORDER — FENTANYL CITRATE 50 UG/ML
25 INJECTION, SOLUTION INTRAMUSCULAR; INTRAVENOUS EVERY 5 MIN PRN
Status: DISCONTINUED | OUTPATIENT
Start: 2019-01-08 | End: 2019-01-08 | Stop reason: HOSPADM

## 2019-01-08 RX ORDER — FENTANYL CITRATE 50 UG/ML
INJECTION, SOLUTION INTRAMUSCULAR; INTRAVENOUS PRN
Status: DISCONTINUED | OUTPATIENT
Start: 2019-01-08 | End: 2019-01-08 | Stop reason: SDUPTHER

## 2019-01-08 RX ORDER — PROPOFOL 10 MG/ML
INJECTION, EMULSION INTRAVENOUS PRN
Status: DISCONTINUED | OUTPATIENT
Start: 2019-01-08 | End: 2019-01-08 | Stop reason: SDUPTHER

## 2019-01-08 RX ORDER — SODIUM CHLORIDE 9 MG/ML
INJECTION, SOLUTION INTRAVENOUS CONTINUOUS
Status: DISCONTINUED | OUTPATIENT
Start: 2019-01-08 | End: 2019-01-08

## 2019-01-08 RX ORDER — MORPHINE SULFATE 2 MG/ML
1 INJECTION, SOLUTION INTRAMUSCULAR; INTRAVENOUS EVERY 5 MIN PRN
Status: DISCONTINUED | OUTPATIENT
Start: 2019-01-08 | End: 2019-01-08 | Stop reason: HOSPADM

## 2019-01-08 RX ORDER — TRANEXAMIC ACID 100 MG/ML
INJECTION, SOLUTION INTRAVENOUS
Status: COMPLETED | OUTPATIENT
Start: 2019-01-08 | End: 2019-01-08

## 2019-01-08 RX ORDER — LIDOCAINE HYDROCHLORIDE 20 MG/ML
INJECTION, SOLUTION INFILTRATION; PERINEURAL PRN
Status: DISCONTINUED | OUTPATIENT
Start: 2019-01-08 | End: 2019-01-08 | Stop reason: SDUPTHER

## 2019-01-08 RX ORDER — ONDANSETRON 2 MG/ML
4 INJECTION INTRAMUSCULAR; INTRAVENOUS EVERY 6 HOURS PRN
Status: DISCONTINUED | OUTPATIENT
Start: 2019-01-08 | End: 2019-01-09 | Stop reason: HOSPADM

## 2019-01-08 RX ORDER — OXYCODONE HYDROCHLORIDE AND ACETAMINOPHEN 5; 325 MG/1; MG/1
1-2 TABLET ORAL EVERY 4 HOURS PRN
Qty: 60 TABLET | Refills: 0 | Status: SHIPPED | OUTPATIENT
Start: 2019-01-08 | End: 2019-01-16 | Stop reason: SDUPTHER

## 2019-01-08 RX ORDER — MAGNESIUM HYDROXIDE 1200 MG/15ML
LIQUID ORAL CONTINUOUS PRN
Status: COMPLETED | OUTPATIENT
Start: 2019-01-08 | End: 2019-01-08

## 2019-01-08 RX ORDER — SODIUM CHLORIDE 0.9 % (FLUSH) 0.9 %
10 SYRINGE (ML) INJECTION EVERY 12 HOURS SCHEDULED
Status: DISCONTINUED | OUTPATIENT
Start: 2019-01-08 | End: 2019-01-09 | Stop reason: HOSPADM

## 2019-01-08 RX ORDER — MEPERIDINE HYDROCHLORIDE 25 MG/ML
12.5 INJECTION INTRAMUSCULAR; INTRAVENOUS; SUBCUTANEOUS EVERY 5 MIN PRN
Status: DISCONTINUED | OUTPATIENT
Start: 2019-01-08 | End: 2019-01-08 | Stop reason: HOSPADM

## 2019-01-08 RX ORDER — ACETAMINOPHEN 325 MG/1
650 TABLET ORAL EVERY 4 HOURS PRN
Status: DISCONTINUED | OUTPATIENT
Start: 2019-01-08 | End: 2019-01-09 | Stop reason: HOSPADM

## 2019-01-08 RX ORDER — KETAMINE HCL IN NACL, ISO-OSM 100MG/10ML
SYRINGE (ML) INJECTION PRN
Status: DISCONTINUED | OUTPATIENT
Start: 2019-01-08 | End: 2019-01-08 | Stop reason: SDUPTHER

## 2019-01-08 RX ORDER — SODIUM CHLORIDE 0.9 % (FLUSH) 0.9 %
10 SYRINGE (ML) INJECTION PRN
Status: DISCONTINUED | OUTPATIENT
Start: 2019-01-08 | End: 2019-01-09 | Stop reason: HOSPADM

## 2019-01-08 RX ORDER — KETOROLAC TROMETHAMINE 30 MG/ML
INJECTION, SOLUTION INTRAMUSCULAR; INTRAVENOUS PRN
Status: DISCONTINUED | OUTPATIENT
Start: 2019-01-08 | End: 2019-01-08 | Stop reason: SDUPTHER

## 2019-01-08 RX ORDER — HYDROMORPHONE HCL 110MG/55ML
PATIENT CONTROLLED ANALGESIA SYRINGE INTRAVENOUS PRN
Status: DISCONTINUED | OUTPATIENT
Start: 2019-01-08 | End: 2019-01-08 | Stop reason: SDUPTHER

## 2019-01-08 RX ORDER — INSULIN GLARGINE 100 [IU]/ML
0.25 INJECTION, SOLUTION SUBCUTANEOUS NIGHTLY
Status: DISCONTINUED | OUTPATIENT
Start: 2019-01-08 | End: 2019-01-09

## 2019-01-08 RX ORDER — FERROUS SULFATE TAB EC 324 MG (65 MG FE EQUIVALENT) 324 (65 FE) MG
324 TABLET DELAYED RESPONSE ORAL
Status: DISCONTINUED | OUTPATIENT
Start: 2019-01-09 | End: 2019-01-09 | Stop reason: HOSPADM

## 2019-01-08 RX ORDER — SODIUM CHLORIDE 0.9 % (FLUSH) 0.9 %
10 SYRINGE (ML) INJECTION EVERY 12 HOURS SCHEDULED
Status: DISCONTINUED | OUTPATIENT
Start: 2019-01-08 | End: 2019-01-08 | Stop reason: HOSPADM

## 2019-01-08 RX ORDER — CELECOXIB 200 MG/1
200 CAPSULE ORAL EVERY 24 HOURS
Status: DISCONTINUED | OUTPATIENT
Start: 2019-01-09 | End: 2019-01-09 | Stop reason: HOSPADM

## 2019-01-08 RX ORDER — OXYCODONE HYDROCHLORIDE AND ACETAMINOPHEN 5; 325 MG/1; MG/1
2 TABLET ORAL EVERY 4 HOURS PRN
Status: DISCONTINUED | OUTPATIENT
Start: 2019-01-08 | End: 2019-01-09 | Stop reason: HOSPADM

## 2019-01-08 RX ORDER — HYDROCHLOROTHIAZIDE 25 MG/1
25 TABLET ORAL DAILY
Status: DISCONTINUED | OUTPATIENT
Start: 2019-01-09 | End: 2019-01-09 | Stop reason: HOSPADM

## 2019-01-08 RX ORDER — OXYCODONE HYDROCHLORIDE 5 MG/1
5 TABLET ORAL PRN
Status: DISCONTINUED | OUTPATIENT
Start: 2019-01-08 | End: 2019-01-08

## 2019-01-08 RX ADMIN — LABETALOL HYDROCHLORIDE 10 MG: 5 INJECTION, SOLUTION INTRAVENOUS at 11:15

## 2019-01-08 RX ADMIN — SUGAMMADEX 200 MG: 100 INJECTION, SOLUTION INTRAVENOUS at 12:18

## 2019-01-08 RX ADMIN — HYDROMORPHONE HYDROCHLORIDE 0.5 MG: 2 INJECTION INTRAMUSCULAR; INTRAVENOUS; SUBCUTANEOUS at 12:21

## 2019-01-08 RX ADMIN — HYDRALAZINE HYDROCHLORIDE 10 MG: 20 INJECTION INTRAMUSCULAR; INTRAVENOUS at 12:13

## 2019-01-08 RX ADMIN — PROPOFOL 200 MG: 10 INJECTION, EMULSION INTRAVENOUS at 10:56

## 2019-01-08 RX ADMIN — Medication 2 G: at 10:51

## 2019-01-08 RX ADMIN — ONDANSETRON 4 MG: 2 INJECTION INTRAMUSCULAR; INTRAVENOUS at 15:59

## 2019-01-08 RX ADMIN — Medication 30 MG: at 11:20

## 2019-01-08 RX ADMIN — MIDAZOLAM 2 MG: 1 INJECTION INTRAMUSCULAR; INTRAVENOUS at 10:51

## 2019-01-08 RX ADMIN — DEXAMETHASONE SODIUM PHOSPHATE 4 MG: 4 INJECTION, SOLUTION INTRAMUSCULAR; INTRAVENOUS at 11:04

## 2019-01-08 RX ADMIN — ROCURONIUM BROMIDE 50 MG: 10 INJECTION INTRAVENOUS at 10:56

## 2019-01-08 RX ADMIN — OXYCODONE AND ACETAMINOPHEN 2 TABLET: 5; 325 TABLET ORAL at 19:35

## 2019-01-08 RX ADMIN — DOCUSATE SODIUM 100 MG: 100 CAPSULE, LIQUID FILLED ORAL at 21:46

## 2019-01-08 RX ADMIN — SODIUM CHLORIDE: 9 INJECTION, SOLUTION INTRAVENOUS at 19:50

## 2019-01-08 RX ADMIN — ONDANSETRON 4 MG: 2 INJECTION INTRAMUSCULAR; INTRAVENOUS at 12:15

## 2019-01-08 RX ADMIN — INSULIN GLARGINE 30 UNITS: 100 INJECTION, SOLUTION SUBCUTANEOUS at 22:28

## 2019-01-08 RX ADMIN — OXYCODONE AND ACETAMINOPHEN 2 TABLET: 5; 325 TABLET ORAL at 15:22

## 2019-01-08 RX ADMIN — SODIUM CHLORIDE: 9 INJECTION, SOLUTION INTRAVENOUS at 17:45

## 2019-01-08 RX ADMIN — FENTANYL CITRATE 50 MCG: 50 INJECTION INTRAMUSCULAR; INTRAVENOUS at 12:31

## 2019-01-08 RX ADMIN — LIDOCAINE HYDROCHLORIDE 100 MG: 20 INJECTION, SOLUTION INFILTRATION; PERINEURAL at 10:54

## 2019-01-08 RX ADMIN — FAMOTIDINE 20 MG: 10 INJECTION, SOLUTION INTRAVENOUS at 21:47

## 2019-01-08 RX ADMIN — FENTANYL CITRATE 50 MCG: 50 INJECTION INTRAMUSCULAR; INTRAVENOUS at 12:39

## 2019-01-08 RX ADMIN — CELECOXIB 400 MG: 200 CAPSULE ORAL at 10:36

## 2019-01-08 RX ADMIN — INSULIN LISPRO 1 UNITS: 100 INJECTION, SOLUTION INTRAVENOUS; SUBCUTANEOUS at 22:29

## 2019-01-08 RX ADMIN — OXYCODONE AND ACETAMINOPHEN 2 TABLET: 5; 325 TABLET ORAL at 23:36

## 2019-01-08 RX ADMIN — KETOROLAC TROMETHAMINE 30 MG: 30 INJECTION, SOLUTION INTRAMUSCULAR at 12:15

## 2019-01-08 RX ADMIN — CEFAZOLIN 3 G: 10 INJECTION, POWDER, FOR SOLUTION INTRAVENOUS; PARENTERAL at 19:35

## 2019-01-08 RX ADMIN — FENTANYL CITRATE 25 MCG: 50 INJECTION, SOLUTION INTRAMUSCULAR; INTRAVENOUS at 13:44

## 2019-01-08 RX ADMIN — SODIUM CHLORIDE: 9 INJECTION, SOLUTION INTRAVENOUS at 10:52

## 2019-01-08 RX ADMIN — DICLOFENAC 2 G: 10 GEL TOPICAL at 22:28

## 2019-01-08 RX ADMIN — FENTANYL CITRATE 100 MCG: 50 INJECTION INTRAMUSCULAR; INTRAVENOUS at 10:54

## 2019-01-08 RX ADMIN — LABETALOL HYDROCHLORIDE 5 MG: 5 INJECTION, SOLUTION INTRAVENOUS at 11:13

## 2019-01-08 RX ADMIN — HYDROMORPHONE HYDROCHLORIDE 1 MG: 2 INJECTION INTRAMUSCULAR; INTRAVENOUS; SUBCUTANEOUS at 11:03

## 2019-01-08 RX ADMIN — MORPHINE SULFATE 2 MG: 2 INJECTION, SOLUTION INTRAMUSCULAR; INTRAVENOUS at 21:47

## 2019-01-08 RX ADMIN — HYDROMORPHONE HYDROCHLORIDE 0.5 MG: 2 INJECTION INTRAMUSCULAR; INTRAVENOUS; SUBCUTANEOUS at 11:31

## 2019-01-08 ASSESSMENT — PULMONARY FUNCTION TESTS
PIF_VALUE: 21
PIF_VALUE: 7
PIF_VALUE: 21
PIF_VALUE: 26
PIF_VALUE: 25
PIF_VALUE: 15
PIF_VALUE: 21
PIF_VALUE: 23
PIF_VALUE: 10
PIF_VALUE: 25
PIF_VALUE: 24
PIF_VALUE: 21
PIF_VALUE: 0
PIF_VALUE: 27
PIF_VALUE: 25
PIF_VALUE: 20
PIF_VALUE: 11
PIF_VALUE: 24
PIF_VALUE: 5
PIF_VALUE: 19
PIF_VALUE: 26
PIF_VALUE: 24
PIF_VALUE: 24
PIF_VALUE: 22
PIF_VALUE: 3
PIF_VALUE: 18
PIF_VALUE: 24
PIF_VALUE: 14
PIF_VALUE: 24
PIF_VALUE: 2
PIF_VALUE: 25
PIF_VALUE: 1
PIF_VALUE: 0
PIF_VALUE: 23
PIF_VALUE: 19
PIF_VALUE: 25
PIF_VALUE: 23
PIF_VALUE: 26
PIF_VALUE: 24
PIF_VALUE: 24
PIF_VALUE: 26
PIF_VALUE: 26
PIF_VALUE: 22
PIF_VALUE: 18
PIF_VALUE: 26
PIF_VALUE: 18
PIF_VALUE: 25
PIF_VALUE: 26
PIF_VALUE: 25
PIF_VALUE: 23
PIF_VALUE: 24
PIF_VALUE: 21
PIF_VALUE: 24
PIF_VALUE: 27
PIF_VALUE: 26
PIF_VALUE: 2
PIF_VALUE: 21
PIF_VALUE: 22
PIF_VALUE: 26
PIF_VALUE: 25
PIF_VALUE: 10
PIF_VALUE: 24
PIF_VALUE: 25
PIF_VALUE: 22
PIF_VALUE: 27
PIF_VALUE: 26
PIF_VALUE: 27
PIF_VALUE: 23
PIF_VALUE: 18
PIF_VALUE: 3
PIF_VALUE: 1
PIF_VALUE: 26
PIF_VALUE: 22
PIF_VALUE: 0
PIF_VALUE: 27
PIF_VALUE: 1
PIF_VALUE: 0
PIF_VALUE: 26
PIF_VALUE: 3
PIF_VALUE: 22
PIF_VALUE: 27
PIF_VALUE: 25
PIF_VALUE: 11
PIF_VALUE: 24
PIF_VALUE: 26
PIF_VALUE: 24
PIF_VALUE: 26
PIF_VALUE: 26
PIF_VALUE: 22
PIF_VALUE: 10
PIF_VALUE: 24
PIF_VALUE: 22
PIF_VALUE: 19
PIF_VALUE: 26
PIF_VALUE: 26
PIF_VALUE: 0
PIF_VALUE: 23
PIF_VALUE: 25
PIF_VALUE: 26
PIF_VALUE: 2
PIF_VALUE: 25
PIF_VALUE: 23
PIF_VALUE: 24
PIF_VALUE: 21

## 2019-01-08 ASSESSMENT — PAIN DESCRIPTION - PAIN TYPE
TYPE: SURGICAL PAIN

## 2019-01-08 ASSESSMENT — PAIN DESCRIPTION - ONSET
ONSET: ON-GOING

## 2019-01-08 ASSESSMENT — PAIN SCALES - GENERAL
PAINLEVEL_OUTOF10: 10
PAINLEVEL_OUTOF10: 8
PAINLEVEL_OUTOF10: 9
PAINLEVEL_OUTOF10: 8
PAINLEVEL_OUTOF10: 9
PAINLEVEL_OUTOF10: 0
PAINLEVEL_OUTOF10: 6
PAINLEVEL_OUTOF10: 8
PAINLEVEL_OUTOF10: 10

## 2019-01-08 ASSESSMENT — PAIN DESCRIPTION - FREQUENCY
FREQUENCY: CONTINUOUS

## 2019-01-08 ASSESSMENT — PAIN DESCRIPTION - LOCATION
LOCATION: KNEE

## 2019-01-08 ASSESSMENT — PAIN DESCRIPTION - ORIENTATION
ORIENTATION: RIGHT

## 2019-01-08 ASSESSMENT — PAIN DESCRIPTION - DESCRIPTORS
DESCRIPTORS: ACHING
DESCRIPTORS: THROBBING
DESCRIPTORS: TENDER;THROBBING
DESCRIPTORS: ACHING

## 2019-01-08 ASSESSMENT — PAIN DESCRIPTION - PROGRESSION
CLINICAL_PROGRESSION: GRADUALLY WORSENING
CLINICAL_PROGRESSION: GRADUALLY WORSENING

## 2019-01-08 ASSESSMENT — PAIN - FUNCTIONAL ASSESSMENT: PAIN_FUNCTIONAL_ASSESSMENT: 0-10

## 2019-01-09 VITALS
RESPIRATION RATE: 16 BRPM | HEIGHT: 67 IN | SYSTOLIC BLOOD PRESSURE: 121 MMHG | BODY MASS INDEX: 43.91 KG/M2 | DIASTOLIC BLOOD PRESSURE: 65 MMHG | OXYGEN SATURATION: 93 % | WEIGHT: 279.76 LBS | TEMPERATURE: 97.8 F | HEART RATE: 91 BPM

## 2019-01-09 LAB
ESTIMATED AVERAGE GLUCOSE: 122.6 MG/DL
GLUCOSE BLD-MCNC: 140 MG/DL (ref 70–99)
GLUCOSE BLD-MCNC: 94 MG/DL (ref 70–99)
HBA1C MFR BLD: 5.9 %
HCT VFR BLD CALC: 31 % (ref 36–48)
HEMOGLOBIN: 10.3 G/DL (ref 12–16)
PERFORMED ON: ABNORMAL
PERFORMED ON: NORMAL

## 2019-01-09 PROCEDURE — 36415 COLL VENOUS BLD VENIPUNCTURE: CPT

## 2019-01-09 PROCEDURE — 6370000000 HC RX 637 (ALT 250 FOR IP): Performed by: NURSE PRACTITIONER

## 2019-01-09 PROCEDURE — 97535 SELF CARE MNGMENT TRAINING: CPT

## 2019-01-09 PROCEDURE — 97530 THERAPEUTIC ACTIVITIES: CPT

## 2019-01-09 PROCEDURE — 2580000003 HC RX 258: Performed by: NURSE PRACTITIONER

## 2019-01-09 PROCEDURE — 85014 HEMATOCRIT: CPT

## 2019-01-09 PROCEDURE — 97110 THERAPEUTIC EXERCISES: CPT

## 2019-01-09 PROCEDURE — 85018 HEMOGLOBIN: CPT

## 2019-01-09 PROCEDURE — 97116 GAIT TRAINING THERAPY: CPT

## 2019-01-09 PROCEDURE — 6360000002 HC RX W HCPCS: Performed by: NURSE PRACTITIONER

## 2019-01-09 PROCEDURE — S0028 INJECTION, FAMOTIDINE, 20 MG: HCPCS | Performed by: NURSE PRACTITIONER

## 2019-01-09 RX ORDER — CYCLOBENZAPRINE HCL 5 MG
5 TABLET ORAL 3 TIMES DAILY PRN
Qty: 15 TABLET | Refills: 0 | Status: SHIPPED | OUTPATIENT
Start: 2019-01-09 | End: 2019-01-16

## 2019-01-09 RX ORDER — CYCLOBENZAPRINE HCL 10 MG
5 TABLET ORAL 3 TIMES DAILY PRN
Status: DISCONTINUED | OUTPATIENT
Start: 2019-01-09 | End: 2019-01-09 | Stop reason: HOSPADM

## 2019-01-09 RX ADMIN — CYCLOBENZAPRINE HYDROCHLORIDE 5 MG: 10 TABLET, FILM COATED ORAL at 11:54

## 2019-01-09 RX ADMIN — DOCUSATE SODIUM 100 MG: 100 CAPSULE, LIQUID FILLED ORAL at 08:46

## 2019-01-09 RX ADMIN — CELECOXIB 200 MG: 200 CAPSULE ORAL at 05:13

## 2019-01-09 RX ADMIN — CETIRIZINE HYDROCHLORIDE 10 MG: 10 TABLET, FILM COATED ORAL at 08:46

## 2019-01-09 RX ADMIN — HYDROCHLOROTHIAZIDE 25 MG: 25 TABLET ORAL at 08:46

## 2019-01-09 RX ADMIN — FERROUS SULFATE TAB EC 324 MG (65 MG FE EQUIVALENT) 324 MG: 324 (65 FE) TABLET DELAYED RESPONSE at 08:46

## 2019-01-09 RX ADMIN — Medication 10 ML: at 08:47

## 2019-01-09 RX ADMIN — OXYCODONE AND ACETAMINOPHEN 2 TABLET: 5; 325 TABLET ORAL at 03:30

## 2019-01-09 RX ADMIN — CEFAZOLIN 3 G: 10 INJECTION, POWDER, FOR SOLUTION INTRAVENOUS; PARENTERAL at 03:30

## 2019-01-09 RX ADMIN — ENOXAPARIN SODIUM 30 MG: 30 INJECTION SUBCUTANEOUS at 08:46

## 2019-01-09 RX ADMIN — MORPHINE SULFATE 2 MG: 2 INJECTION, SOLUTION INTRAMUSCULAR; INTRAVENOUS at 01:28

## 2019-01-09 RX ADMIN — OXYCODONE AND ACETAMINOPHEN 2 TABLET: 5; 325 TABLET ORAL at 13:02

## 2019-01-09 RX ADMIN — LISINOPRIL 10 MG: 10 TABLET ORAL at 08:46

## 2019-01-09 RX ADMIN — OXYCODONE AND ACETAMINOPHEN 2 TABLET: 5; 325 TABLET ORAL at 08:46

## 2019-01-09 RX ADMIN — DICLOFENAC 2 G: 10 GEL TOPICAL at 01:28

## 2019-01-09 RX ADMIN — MORPHINE SULFATE 2 MG: 2 INJECTION, SOLUTION INTRAMUSCULAR; INTRAVENOUS at 05:13

## 2019-01-09 RX ADMIN — FAMOTIDINE 20 MG: 10 INJECTION, SOLUTION INTRAVENOUS at 08:46

## 2019-01-09 ASSESSMENT — PAIN DESCRIPTION - ORIENTATION
ORIENTATION: RIGHT

## 2019-01-09 ASSESSMENT — PAIN DESCRIPTION - PAIN TYPE
TYPE: SURGICAL PAIN

## 2019-01-09 ASSESSMENT — PAIN DESCRIPTION - PROGRESSION
CLINICAL_PROGRESSION: GRADUALLY WORSENING
CLINICAL_PROGRESSION: NOT CHANGED
CLINICAL_PROGRESSION: NOT CHANGED
CLINICAL_PROGRESSION: GRADUALLY IMPROVING

## 2019-01-09 ASSESSMENT — PAIN DESCRIPTION - FREQUENCY
FREQUENCY: CONTINUOUS

## 2019-01-09 ASSESSMENT — PAIN DESCRIPTION - LOCATION
LOCATION: KNEE

## 2019-01-09 ASSESSMENT — PAIN SCALES - GENERAL
PAINLEVEL_OUTOF10: 10
PAINLEVEL_OUTOF10: 8
PAINLEVEL_OUTOF10: 0
PAINLEVEL_OUTOF10: 0
PAINLEVEL_OUTOF10: 8
PAINLEVEL_OUTOF10: 0
PAINLEVEL_OUTOF10: 8
PAINLEVEL_OUTOF10: 8
PAINLEVEL_OUTOF10: 9
PAINLEVEL_OUTOF10: 0
PAINLEVEL_OUTOF10: 10
PAINLEVEL_OUTOF10: 9

## 2019-01-09 ASSESSMENT — PAIN DESCRIPTION - ONSET
ONSET: ON-GOING

## 2019-01-09 ASSESSMENT — PAIN DESCRIPTION - DESCRIPTORS
DESCRIPTORS: ACHING

## 2019-01-15 ENCOUNTER — TELEPHONE (OUTPATIENT)
Dept: ORTHOPEDIC SURGERY | Age: 54
End: 2019-01-15

## 2019-01-15 ENCOUNTER — TELEPHONE (OUTPATIENT)
Dept: INTERNAL MEDICINE CLINIC | Age: 54
End: 2019-01-15

## 2019-01-15 DIAGNOSIS — M17.11 PRIMARY OSTEOARTHRITIS OF RIGHT KNEE: ICD-10-CM

## 2019-01-16 RX ORDER — OXYCODONE HYDROCHLORIDE AND ACETAMINOPHEN 5; 325 MG/1; MG/1
1-2 TABLET ORAL EVERY 6 HOURS PRN
Qty: 50 TABLET | Refills: 0 | Status: SHIPPED | OUTPATIENT
Start: 2019-01-16 | End: 2019-01-16 | Stop reason: CLARIF

## 2019-01-16 RX ORDER — CYCLOBENZAPRINE HCL 10 MG
10 TABLET ORAL 3 TIMES DAILY PRN
Qty: 30 TABLET | Refills: 0 | Status: SHIPPED | OUTPATIENT
Start: 2019-01-16 | End: 2019-01-16 | Stop reason: CLARIF

## 2019-01-17 ENCOUNTER — TELEPHONE (OUTPATIENT)
Dept: ORTHOPEDIC SURGERY | Age: 54
End: 2019-01-17

## 2019-01-17 DIAGNOSIS — Z96.651 STATUS POST TOTAL RIGHT KNEE REPLACEMENT: Primary | ICD-10-CM

## 2019-01-21 ENCOUNTER — TELEPHONE (OUTPATIENT)
Dept: ORTHOPEDIC SURGERY | Age: 54
End: 2019-01-21

## 2019-01-21 RX ORDER — OXYCODONE HYDROCHLORIDE AND ACETAMINOPHEN 5; 325 MG/1; MG/1
1-2 TABLET ORAL
Qty: 40 TABLET | Refills: 0 | Status: SHIPPED | OUTPATIENT
Start: 2019-01-21 | End: 2019-01-24 | Stop reason: SDUPTHER

## 2019-01-24 ENCOUNTER — OFFICE VISIT (OUTPATIENT)
Dept: ORTHOPEDIC SURGERY | Age: 54
End: 2019-01-24

## 2019-01-24 VITALS
BODY MASS INDEX: 42.06 KG/M2 | WEIGHT: 268 LBS | HEART RATE: 96 BPM | SYSTOLIC BLOOD PRESSURE: 103 MMHG | DIASTOLIC BLOOD PRESSURE: 69 MMHG | HEIGHT: 67 IN

## 2019-01-24 DIAGNOSIS — Z96.651 STATUS POST TOTAL RIGHT KNEE REPLACEMENT: ICD-10-CM

## 2019-01-24 DIAGNOSIS — M17.11 PRIMARY OSTEOARTHRITIS OF RIGHT KNEE: Primary | ICD-10-CM

## 2019-01-24 PROCEDURE — 99024 POSTOP FOLLOW-UP VISIT: CPT | Performed by: NURSE PRACTITIONER

## 2019-01-24 RX ORDER — OXYCODONE HYDROCHLORIDE AND ACETAMINOPHEN 5; 325 MG/1; MG/1
1-2 TABLET ORAL EVERY 8 HOURS PRN
Qty: 40 TABLET | Refills: 0 | Status: SHIPPED | OUTPATIENT
Start: 2019-01-26 | End: 2019-02-04 | Stop reason: SDUPTHER

## 2019-02-04 ENCOUNTER — TELEPHONE (OUTPATIENT)
Dept: ORTHOPEDIC SURGERY | Age: 54
End: 2019-02-04

## 2019-02-04 DIAGNOSIS — Z96.651 STATUS POST TOTAL RIGHT KNEE REPLACEMENT: ICD-10-CM

## 2019-02-04 RX ORDER — OXYCODONE HYDROCHLORIDE AND ACETAMINOPHEN 5; 325 MG/1; MG/1
1 TABLET ORAL EVERY 8 HOURS PRN
Qty: 30 TABLET | Refills: 0 | Status: SHIPPED | OUTPATIENT
Start: 2019-02-04 | End: 2019-02-14

## 2019-02-07 ENCOUNTER — TELEPHONE (OUTPATIENT)
Dept: ORTHOPEDIC SURGERY | Age: 54
End: 2019-02-07

## 2019-02-11 ENCOUNTER — TELEPHONE (OUTPATIENT)
Dept: ORTHOPEDIC SURGERY | Age: 54
End: 2019-02-11

## 2019-02-11 DIAGNOSIS — Z96.651 STATUS POST TOTAL RIGHT KNEE REPLACEMENT: ICD-10-CM

## 2019-02-11 RX ORDER — OXYCODONE HYDROCHLORIDE AND ACETAMINOPHEN 5; 325 MG/1; MG/1
1 TABLET ORAL EVERY 8 HOURS PRN
Qty: 21 TABLET | Refills: 0 | Status: CANCELLED | OUTPATIENT
Start: 2019-02-13 | End: 2019-02-20

## 2019-02-11 RX ORDER — HYDROCODONE BITARTRATE AND ACETAMINOPHEN 5; 325 MG/1; MG/1
1 TABLET ORAL EVERY 8 HOURS PRN
Qty: 21 TABLET | Refills: 0 | Status: SHIPPED | OUTPATIENT
Start: 2019-02-11 | End: 2019-02-18 | Stop reason: SDUPTHER

## 2019-02-11 RX ORDER — HYDROCODONE BITARTRATE AND ACETAMINOPHEN 10; 325 MG/1; MG/1
1 TABLET ORAL EVERY 8 HOURS PRN
Qty: 21 TABLET | Refills: 0 | Status: CANCELLED | OUTPATIENT
Start: 2019-02-13 | End: 2019-02-20

## 2019-02-12 ENCOUNTER — TELEPHONE (OUTPATIENT)
Dept: ORTHOPEDIC SURGERY | Age: 54
End: 2019-02-12

## 2019-02-15 ENCOUNTER — TELEPHONE (OUTPATIENT)
Dept: ORTHOPEDIC SURGERY | Age: 54
End: 2019-02-15

## 2019-02-15 DIAGNOSIS — Z96.651 STATUS POST TOTAL RIGHT KNEE REPLACEMENT: ICD-10-CM

## 2019-02-18 RX ORDER — HYDROCODONE BITARTRATE AND ACETAMINOPHEN 5; 325 MG/1; MG/1
1 TABLET ORAL EVERY 8 HOURS PRN
Qty: 21 TABLET | Refills: 0 | Status: SHIPPED | OUTPATIENT
Start: 2019-02-18 | End: 2019-02-25 | Stop reason: SDUPTHER

## 2019-02-25 ENCOUNTER — TELEPHONE (OUTPATIENT)
Dept: ORTHOPEDIC SURGERY | Age: 54
End: 2019-02-25

## 2019-02-25 ENCOUNTER — OFFICE VISIT (OUTPATIENT)
Dept: ORTHOPEDIC SURGERY | Age: 54
End: 2019-02-25

## 2019-02-25 VITALS — HEIGHT: 67 IN | BODY MASS INDEX: 42.06 KG/M2 | WEIGHT: 268 LBS

## 2019-02-25 DIAGNOSIS — Z96.651 STATUS POST TOTAL RIGHT KNEE REPLACEMENT: ICD-10-CM

## 2019-02-25 PROCEDURE — 99024 POSTOP FOLLOW-UP VISIT: CPT | Performed by: NURSE PRACTITIONER

## 2019-02-25 RX ORDER — HYDROCODONE BITARTRATE AND ACETAMINOPHEN 5; 325 MG/1; MG/1
1 TABLET ORAL EVERY 8 HOURS PRN
Qty: 21 TABLET | Refills: 0 | Status: SHIPPED | OUTPATIENT
Start: 2019-02-25 | End: 2019-03-04 | Stop reason: SDUPTHER

## 2019-03-04 ENCOUNTER — TELEPHONE (OUTPATIENT)
Dept: ORTHOPEDIC SURGERY | Age: 54
End: 2019-03-04

## 2019-03-04 DIAGNOSIS — Z96.651 STATUS POST TOTAL RIGHT KNEE REPLACEMENT: ICD-10-CM

## 2019-03-04 RX ORDER — HYDROCODONE BITARTRATE AND ACETAMINOPHEN 5; 325 MG/1; MG/1
1 TABLET ORAL 2 TIMES DAILY PRN
Qty: 14 TABLET | Refills: 0 | Status: SHIPPED | OUTPATIENT
Start: 2019-03-04 | End: 2019-03-11 | Stop reason: SDUPTHER

## 2019-03-11 ENCOUNTER — TELEPHONE (OUTPATIENT)
Dept: ORTHOPEDIC SURGERY | Age: 54
End: 2019-03-11

## 2019-03-11 DIAGNOSIS — Z96.651 STATUS POST TOTAL RIGHT KNEE REPLACEMENT: ICD-10-CM

## 2019-03-11 RX ORDER — HYDROCODONE BITARTRATE AND ACETAMINOPHEN 5; 325 MG/1; MG/1
1 TABLET ORAL 2 TIMES DAILY PRN
Qty: 14 TABLET | Refills: 0 | Status: SHIPPED | OUTPATIENT
Start: 2019-03-11 | End: 2019-03-18 | Stop reason: SDUPTHER

## 2019-03-18 ENCOUNTER — TELEPHONE (OUTPATIENT)
Dept: ORTHOPEDIC SURGERY | Age: 54
End: 2019-03-18

## 2019-03-18 DIAGNOSIS — Z96.651 STATUS POST TOTAL RIGHT KNEE REPLACEMENT: ICD-10-CM

## 2019-03-18 RX ORDER — HYDROCODONE BITARTRATE AND ACETAMINOPHEN 5; 325 MG/1; MG/1
1 TABLET ORAL NIGHTLY PRN
Qty: 7 TABLET | Refills: 0 | Status: SHIPPED | OUTPATIENT
Start: 2019-03-18 | End: 2019-03-25

## 2019-04-01 ENCOUNTER — OFFICE VISIT (OUTPATIENT)
Dept: ORTHOPEDIC SURGERY | Age: 54
End: 2019-04-01

## 2019-04-01 VITALS — WEIGHT: 271 LBS | HEIGHT: 67 IN | BODY MASS INDEX: 42.53 KG/M2

## 2019-04-01 DIAGNOSIS — Z96.651 STATUS POST TOTAL RIGHT KNEE REPLACEMENT: Primary | ICD-10-CM

## 2019-04-01 PROCEDURE — 99024 POSTOP FOLLOW-UP VISIT: CPT | Performed by: ORTHOPAEDIC SURGERY

## 2019-04-02 ENCOUNTER — TELEPHONE (OUTPATIENT)
Dept: ORTHOPEDIC SURGERY | Age: 54
End: 2019-04-02

## 2019-04-02 NOTE — TELEPHONE ENCOUNTER
I left a message with an adult for the patient to call back. I updated her forms with the new return to work date and faxed them along with the office notes to Tila.

## 2019-04-02 NOTE — TELEPHONE ENCOUNTER
Patient was seen in office and was told she should RTW in 2 weeks with no restrictions  Patient states that [employer] absence management did not receive an paperwork stating this  I did send over the work note to them- but was not sure if the paperwork actually needs to be updated as well

## 2019-07-15 ENCOUNTER — OFFICE VISIT (OUTPATIENT)
Dept: ORTHOPEDIC SURGERY | Age: 54
End: 2019-07-15

## 2019-07-15 VITALS — BODY MASS INDEX: 42.44 KG/M2 | HEIGHT: 67 IN

## 2019-07-15 DIAGNOSIS — Z96.651 STATUS POST TOTAL RIGHT KNEE REPLACEMENT: Primary | ICD-10-CM

## 2019-07-15 PROCEDURE — 99213 OFFICE O/P EST LOW 20 MIN: CPT | Performed by: ORTHOPAEDIC SURGERY

## 2020-01-30 ENCOUNTER — OFFICE VISIT (OUTPATIENT)
Dept: ORTHOPEDIC SURGERY | Age: 55
End: 2020-01-30
Payer: COMMERCIAL

## 2020-01-30 VITALS — BODY MASS INDEX: 42.53 KG/M2 | HEIGHT: 67 IN | RESPIRATION RATE: 16 BRPM | WEIGHT: 271 LBS

## 2020-01-30 PROCEDURE — 20610 DRAIN/INJ JOINT/BURSA W/O US: CPT | Performed by: ORTHOPAEDIC SURGERY

## 2020-01-30 PROCEDURE — 99214 OFFICE O/P EST MOD 30 MIN: CPT | Performed by: ORTHOPAEDIC SURGERY

## 2020-01-30 RX ORDER — BUPIVACAINE HYDROCHLORIDE 2.5 MG/ML
3 INJECTION, SOLUTION INFILTRATION; PERINEURAL ONCE
Status: COMPLETED | OUTPATIENT
Start: 2020-01-30 | End: 2020-01-30

## 2020-01-30 RX ORDER — METHYLPREDNISOLONE ACETATE 40 MG/ML
80 INJECTION, SUSPENSION INTRA-ARTICULAR; INTRALESIONAL; INTRAMUSCULAR; SOFT TISSUE ONCE
Status: COMPLETED | OUTPATIENT
Start: 2020-01-30 | End: 2020-01-30

## 2020-01-30 RX ADMIN — BUPIVACAINE HYDROCHLORIDE 7.5 MG: 2.5 INJECTION, SOLUTION INFILTRATION; PERINEURAL at 11:15

## 2020-01-30 RX ADMIN — METHYLPREDNISOLONE ACETATE 80 MG: 40 INJECTION, SUSPENSION INTRA-ARTICULAR; INTRALESIONAL; INTRAMUSCULAR; SOFT TISSUE at 11:15

## 2020-01-30 NOTE — PROGRESS NOTES
Hever Bobby  <[id number]>  January 30, 2020    Chief Complaint   Patient presents with    Knee Pain     left knee     Follow-up     rt tka 1-8-19           History: Ms. Hever Bobby is a pleasant 47 y.o. old female here regarding follow-up for her right TKA completed on 1/8/19. The patient has been having severe left knee pain. She has difficulty getting up from a seated position. She does feel that the left knee issues are affecting her right knee back. The patient's  past medical history, medications, allergies,  family history, social history, and review of systems have been reviewed, and dated and are recorded in the chart. Resp 16   Ht 5' 7\" (1.702 m)   Wt 271 lb (122.9 kg)   BMI 42.44 kg/m²     Physical: Ms. Hever Bobby appears well, she is in no apparent distress, she demonstrates appropriate mood & affect. She is alert and oriented to person, place and time. She has mild swelling. There is No evidence of DVT seen on physical exam. She is neurovascularly intact distally. Range of motion is from 0 degrees to 120 degrees. The incision is clean, dry, intact and nontender and without erythema. Strength in the knee is 5/5. There is no instability with varus and valgus stressing of the knee. There is no pain with range of motion of the hips. Examination of the left knee reveals severe medial joint line tenderness. She does have a 1+ knee effusion. She has severe patellofemoral crepitus with range of motion. There is no evidence of instability with varus or valgus stressing. X-rays: 3 views of the right knee obtained in the office today were extensively reviewed. The prosthesis is well aligned. There is no evidence of loosening. 4 views of the left knee obtained in the office today were extensively reviewed. There is evidence of severe osteoarthritis. The changes are most severe medially. Impression: #1 S/p right Total Knee Arthroplasty,Doing well postoperatively.   #2 severe end stage osteoarthritis of the left knee    Plan: At this time, the left knee was injected under sterile conditions. After a Betadine prep of the joint, 3 cc of 0.25% Marcaine and 2 cc of 40 mg Depo-medrol were injected into the knee. The patient tolerated the injection rather well. The patient was instructed to follow up for any signs of infection. Natural history and expected course discussed. Questions answered. Reduction in offending activity. OTC analgesics as needed. The patient will follow up with me in 8 weeks. The patient was instructed on a home exercise program.  She was encouraged to lose weight. If the patient continues to have severe left knee pain, we will need to consider total knee arthroplasty.

## 2020-05-21 ENCOUNTER — OFFICE VISIT (OUTPATIENT)
Dept: ORTHOPEDIC SURGERY | Age: 55
End: 2020-05-21
Payer: COMMERCIAL

## 2020-05-21 VITALS — WEIGHT: 271 LBS | HEIGHT: 67 IN | BODY MASS INDEX: 42.53 KG/M2

## 2020-05-21 PROCEDURE — 20610 DRAIN/INJ JOINT/BURSA W/O US: CPT | Performed by: ORTHOPAEDIC SURGERY

## 2020-05-21 PROCEDURE — G8427 DOCREV CUR MEDS BY ELIG CLIN: HCPCS | Performed by: ORTHOPAEDIC SURGERY

## 2020-05-21 PROCEDURE — 1036F TOBACCO NON-USER: CPT | Performed by: ORTHOPAEDIC SURGERY

## 2020-05-21 PROCEDURE — G8417 CALC BMI ABV UP PARAM F/U: HCPCS | Performed by: ORTHOPAEDIC SURGERY

## 2020-05-21 PROCEDURE — 99213 OFFICE O/P EST LOW 20 MIN: CPT | Performed by: ORTHOPAEDIC SURGERY

## 2020-05-21 PROCEDURE — 3017F COLORECTAL CA SCREEN DOC REV: CPT | Performed by: ORTHOPAEDIC SURGERY

## 2020-05-21 RX ORDER — METHYLPREDNISOLONE ACETATE 40 MG/ML
80 INJECTION, SUSPENSION INTRA-ARTICULAR; INTRALESIONAL; INTRAMUSCULAR; SOFT TISSUE ONCE
Status: COMPLETED | OUTPATIENT
Start: 2020-05-21 | End: 2020-05-21

## 2020-05-21 RX ORDER — BUPIVACAINE HYDROCHLORIDE 2.5 MG/ML
3 INJECTION, SOLUTION INFILTRATION; PERINEURAL ONCE
Status: COMPLETED | OUTPATIENT
Start: 2020-05-21 | End: 2020-05-21

## 2020-05-21 RX ADMIN — BUPIVACAINE HYDROCHLORIDE 7.5 MG: 2.5 INJECTION, SOLUTION INFILTRATION; PERINEURAL at 10:59

## 2020-05-21 RX ADMIN — METHYLPREDNISOLONE ACETATE 80 MG: 40 INJECTION, SUSPENSION INTRA-ARTICULAR; INTRALESIONAL; INTRAMUSCULAR; SOFT TISSUE at 10:59

## 2020-05-21 NOTE — PROGRESS NOTES
Nasrin Menjivar  <[id number]>  May 21, 2020    Chief Complaint   Patient presents with    Follow-up     left knee           History: The patient is here in follow-up regarding her left knee. She is having severe left knee pain. The injection back in January did provide moderate relief. I did perform a right total knee arthroplasty back in January 2019. She recovered rather well. The left knee issues are affecting her daily activities. She has difficulty getting up from a seated position. She denies any numbness or tingling. The patient's  past medical history, medications, allergies,  family history, social history, and review of systems have been reviewed, and dated and are recorded in the chart. Ht 5' 7\" (1.702 m)   Wt 271 lb (122.9 kg)   BMI 42.44 kg/m²     Physical: Ms. Nasrin Menjivar appears well, she is in no apparent distress, she demonstrates appropriate mood & affect. She is alert and oriented to person, place and time. Examination of the left lower extremity reveals no pain with range of motion of the hip. She has generalized tenderness to palpation about the joint line of the left knee. Range of motion is from -1 degrees to 115 degrees. Strength is 4+ to 5/5 for all muscle groups about the left knee. There is patellofemoral crepitus with range of motion of the left knee. Varus and valgus stressing of the knees reveals no evidence of instability. There is a small effusion in the left knee. Anterior drawer and Lachman are negative bilaterally. Examination of the skin reveals no rashes, ulceration, or lesion, bilaterally in the lower extremities. Sensation to both lower extremities is grossly intact. Exam of both feet reveals pedal pulses intact and brisk cap refill. Patient is able to dorsiflex and wiggle all toes. Deep tendon reflexes of the lower extremities are normal and symmetric. X-rays: 4 views of the left knee obtained in the office previously were extensively reviewed.

## 2020-08-21 ENCOUNTER — OFFICE VISIT (OUTPATIENT)
Dept: ORTHOPEDIC SURGERY | Age: 55
End: 2020-08-21
Payer: COMMERCIAL

## 2020-08-21 VITALS — BODY MASS INDEX: 43.95 KG/M2 | WEIGHT: 280 LBS | TEMPERATURE: 97.3 F | HEIGHT: 67 IN

## 2020-08-21 PROCEDURE — 20610 DRAIN/INJ JOINT/BURSA W/O US: CPT | Performed by: ORTHOPAEDIC SURGERY

## 2020-08-21 PROCEDURE — G8417 CALC BMI ABV UP PARAM F/U: HCPCS | Performed by: ORTHOPAEDIC SURGERY

## 2020-08-21 PROCEDURE — 99213 OFFICE O/P EST LOW 20 MIN: CPT | Performed by: ORTHOPAEDIC SURGERY

## 2020-08-21 PROCEDURE — 3017F COLORECTAL CA SCREEN DOC REV: CPT | Performed by: ORTHOPAEDIC SURGERY

## 2020-08-21 PROCEDURE — 1036F TOBACCO NON-USER: CPT | Performed by: ORTHOPAEDIC SURGERY

## 2020-08-21 PROCEDURE — G8427 DOCREV CUR MEDS BY ELIG CLIN: HCPCS | Performed by: ORTHOPAEDIC SURGERY

## 2020-08-21 RX ORDER — BUPIVACAINE HYDROCHLORIDE 2.5 MG/ML
3 INJECTION, SOLUTION INFILTRATION; PERINEURAL ONCE
Status: COMPLETED | OUTPATIENT
Start: 2020-08-21 | End: 2020-08-21

## 2020-08-21 RX ORDER — METHYLPREDNISOLONE ACETATE 40 MG/ML
80 INJECTION, SUSPENSION INTRA-ARTICULAR; INTRALESIONAL; INTRAMUSCULAR; SOFT TISSUE ONCE
Status: COMPLETED | OUTPATIENT
Start: 2020-08-21 | End: 2020-08-21

## 2020-08-21 RX ADMIN — BUPIVACAINE HYDROCHLORIDE 7.5 MG: 2.5 INJECTION, SOLUTION INFILTRATION; PERINEURAL at 11:52

## 2020-08-21 RX ADMIN — METHYLPREDNISOLONE ACETATE 80 MG: 40 INJECTION, SUSPENSION INTRA-ARTICULAR; INTRALESIONAL; INTRAMUSCULAR; SOFT TISSUE at 11:53

## 2020-11-20 ENCOUNTER — OFFICE VISIT (OUTPATIENT)
Dept: ORTHOPEDIC SURGERY | Age: 55
End: 2020-11-20
Payer: COMMERCIAL

## 2020-11-20 VITALS — HEIGHT: 67 IN | WEIGHT: 280 LBS | BODY MASS INDEX: 43.95 KG/M2

## 2020-11-20 PROCEDURE — 3017F COLORECTAL CA SCREEN DOC REV: CPT | Performed by: ORTHOPAEDIC SURGERY

## 2020-11-20 PROCEDURE — 1036F TOBACCO NON-USER: CPT | Performed by: ORTHOPAEDIC SURGERY

## 2020-11-20 PROCEDURE — 99213 OFFICE O/P EST LOW 20 MIN: CPT | Performed by: ORTHOPAEDIC SURGERY

## 2020-11-20 PROCEDURE — G8417 CALC BMI ABV UP PARAM F/U: HCPCS | Performed by: ORTHOPAEDIC SURGERY

## 2020-11-20 PROCEDURE — G8484 FLU IMMUNIZE NO ADMIN: HCPCS | Performed by: ORTHOPAEDIC SURGERY

## 2020-11-20 PROCEDURE — G8427 DOCREV CUR MEDS BY ELIG CLIN: HCPCS | Performed by: ORTHOPAEDIC SURGERY

## 2020-11-20 PROCEDURE — 20610 DRAIN/INJ JOINT/BURSA W/O US: CPT | Performed by: ORTHOPAEDIC SURGERY

## 2020-11-20 RX ORDER — METHYLPREDNISOLONE ACETATE 40 MG/ML
80 INJECTION, SUSPENSION INTRA-ARTICULAR; INTRALESIONAL; INTRAMUSCULAR; SOFT TISSUE ONCE
Status: COMPLETED | OUTPATIENT
Start: 2020-11-20 | End: 2020-11-20

## 2020-11-20 RX ORDER — BUPIVACAINE HYDROCHLORIDE 2.5 MG/ML
3 INJECTION, SOLUTION INFILTRATION; PERINEURAL ONCE
Status: COMPLETED | OUTPATIENT
Start: 2020-11-20 | End: 2020-11-20

## 2020-11-20 RX ADMIN — METHYLPREDNISOLONE ACETATE 80 MG: 40 INJECTION, SUSPENSION INTRA-ARTICULAR; INTRALESIONAL; INTRAMUSCULAR; SOFT TISSUE at 10:59

## 2020-11-20 RX ADMIN — BUPIVACAINE HYDROCHLORIDE 7.5 MG: 2.5 INJECTION, SOLUTION INFILTRATION; PERINEURAL at 10:58

## 2020-11-20 NOTE — PROGRESS NOTES
Ivet Hayes  <[id number]>  November 20, 2020    Chief Complaint   Patient presents with    Follow-up     L knee            History: The patient is here in follow-up regarding her left knee. She is having severe left knee pain. The left knee injection received approximately 3 months ago provided moderate relief. Her symptoms recently returned a few weeks ago. The patient does. She has difficulty getting up from a seated position. She does have difficulty with stairs. I did perform a right total knee arthroplasty back in January 2019. She recovered rather well. The left knee issues are affecting her daily activities. She denies any numbness or tingling. The patient can no longer take anti-inflammatories as she was diagnosed with peptic ulcer disease. The patient's  past medical history, medications, allergies,  family history, social history, and review of systems have been reviewed, and dated and are recorded in the chart. Ht 5' 7\" (1.702 m)   Wt 280 lb (127 kg)   BMI 43.85 kg/m²     Physical: Ms. Ivet Hayes appears well, she is in no apparent distress, she demonstrates appropriate mood & affect. She is alert and oriented to person, place and time. Examination of the left lower extremity reveals no pain with range of motion of the hip. She has generalized tenderness to palpation about the joint line of the left knee. Range of motion is from -1 degrees to 115 degrees. Strength is 4+ to 5/5 for all muscle groups about the left knee. There is patellofemoral crepitus with range of motion of the left knee. Varus and valgus stressing of the knees reveals no evidence of instability. There is a small effusion in the left knee. Anterior drawer and Lachman are negative bilaterally. Examination of the skin reveals no rashes, ulceration, or lesion, bilaterally in the lower extremities. Sensation to both lower extremities is grossly intact.  Exam of both feet reveals pedal pulses intact and brisk cap refill. Patient is able to dorsiflex and wiggle all toes. Deep tendon reflexes of the lower extremities are normal and symmetric. X-rays: 4 views of the left knee obtained in the office previously were extensively reviewed. There is evidence of severe osteoarthritis. The changes are most severe medially. Impression: #1 S/p right Total Knee Arthroplasty, Doing well postoperatively. #2 severe end stage osteoarthritis of the left knee    Plan: At this time, the left knee was injected under sterile conditions. After a Betadine prep of the joint, 3 cc of 0.25% Marcaine and 2 cc of 40 mg Depo-medrol were injected into the knee. The patient tolerated the injection rather well. The patient was instructed to follow up for any signs of infection. Natural history and expected course discussed. Questions answered. Reduction in offending activity. OTC analgesics as needed. The patient will follow up with me in 3 months and we will reassess her then. The patient was instructed on a home exercise program.  She was encouraged to lose weight. If the patient continues to have severe left knee pain, we will need to consider total knee arthroplasty versus repeat injection with documentation. Margarette Gorman

## 2021-03-02 ENCOUNTER — OFFICE VISIT (OUTPATIENT)
Dept: ORTHOPEDIC SURGERY | Age: 56
End: 2021-03-02
Payer: COMMERCIAL

## 2021-03-02 VITALS — WEIGHT: 280 LBS | HEIGHT: 67 IN | TEMPERATURE: 98.2 F | BODY MASS INDEX: 43.95 KG/M2

## 2021-03-02 DIAGNOSIS — M17.12 PRIMARY OSTEOARTHRITIS OF LEFT KNEE: Primary | ICD-10-CM

## 2021-03-02 DIAGNOSIS — Z96.651 STATUS POST TOTAL RIGHT KNEE REPLACEMENT: ICD-10-CM

## 2021-03-02 PROCEDURE — G8484 FLU IMMUNIZE NO ADMIN: HCPCS | Performed by: ORTHOPAEDIC SURGERY

## 2021-03-02 PROCEDURE — G8417 CALC BMI ABV UP PARAM F/U: HCPCS | Performed by: ORTHOPAEDIC SURGERY

## 2021-03-02 PROCEDURE — 1036F TOBACCO NON-USER: CPT | Performed by: ORTHOPAEDIC SURGERY

## 2021-03-02 PROCEDURE — 3017F COLORECTAL CA SCREEN DOC REV: CPT | Performed by: ORTHOPAEDIC SURGERY

## 2021-03-02 PROCEDURE — G8428 CUR MEDS NOT DOCUMENT: HCPCS | Performed by: ORTHOPAEDIC SURGERY

## 2021-03-02 PROCEDURE — 20610 DRAIN/INJ JOINT/BURSA W/O US: CPT | Performed by: ORTHOPAEDIC SURGERY

## 2021-03-02 PROCEDURE — 99213 OFFICE O/P EST LOW 20 MIN: CPT | Performed by: ORTHOPAEDIC SURGERY

## 2021-03-02 RX ORDER — BUPIVACAINE HYDROCHLORIDE 2.5 MG/ML
3 INJECTION, SOLUTION INFILTRATION; PERINEURAL ONCE
Status: COMPLETED | OUTPATIENT
Start: 2021-03-02 | End: 2021-03-02

## 2021-03-02 RX ORDER — METHYLPREDNISOLONE ACETATE 40 MG/ML
80 INJECTION, SUSPENSION INTRA-ARTICULAR; INTRALESIONAL; INTRAMUSCULAR; SOFT TISSUE ONCE
Status: COMPLETED | OUTPATIENT
Start: 2021-03-02 | End: 2021-03-02

## 2021-03-02 RX ADMIN — BUPIVACAINE HYDROCHLORIDE 7.5 MG: 2.5 INJECTION, SOLUTION INFILTRATION; PERINEURAL at 16:07

## 2021-03-02 RX ADMIN — METHYLPREDNISOLONE ACETATE 80 MG: 40 INJECTION, SUSPENSION INTRA-ARTICULAR; INTRALESIONAL; INTRAMUSCULAR; SOFT TISSUE at 16:08

## 2021-03-02 NOTE — PROGRESS NOTES
Lee Ann Ryan  <[id number]>  March 2, 2021    Chief Complaint   Patient presents with    Follow-up     L knee            History: The patient is here in follow-up regarding her left knee. She is having severe left knee pain. The left knee injection received approximately 3 months ago provided moderate relief. Her symptoms recently returned a few weeks ago. She does have difficulty with stairs. She does have difficulty getting up from a seated position. I did perform a right total knee arthroplasty back in January 2019. She recovered rather well. The left knee issues are affecting her daily activities. She denies any numbness or tingling. The patient can no longer take anti-inflammatories as she was diagnosed with peptic ulcer disease. The patient's  past medical history, medications, allergies,  family history, social history, and review of systems have been reviewed, and dated and are recorded in the chart. Temp 98.2 °F (36.8 °C) (Temporal)   Ht 5' 7\" (1.702 m)   Wt 280 lb (127 kg)   BMI 43.85 kg/m²     Physical: Ms. Lee Ann Ryan appears well, she is in no apparent distress, she demonstrates appropriate mood & affect. She is alert and oriented to person, place and time. Examination of the left lower extremity reveals no pain with range of motion of the hip. She has generalized tenderness to palpation about the joint line of the left knee. Range of motion is from -1 degrees to 110 degrees. Strength is 4+ to 5/5 for all muscle groups about the left knee. There is patellofemoral crepitus with range of motion of the left knee. Varus and valgus stressing of the knees reveals no evidence of instability. There is a small effusion in the left knee. Anterior drawer and Lachman are negative bilaterally. Examination of the skin reveals no rashes, ulceration, or lesion, bilaterally in the lower extremities. Sensation to both lower extremities is grossly intact.  Exam of both feet reveals pedal pulses intact and brisk cap refill. Patient is able to dorsiflex and wiggle all toes. Deep tendon reflexes of the lower extremities are normal and symmetric. X-rays: 4 views of the left knee obtained in the office previously were extensively reviewed. There is evidence of severe osteoarthritis. The changes are most severe medially. Impression: #1 S/p right Total Knee Arthroplasty, Doing well postoperatively. #2 severe end stage osteoarthritis of the left knee    Plan: At this time, the left knee was injected under sterile conditions. After a Betadine prep of the joint, 3 cc of 0.25% Marcaine and 2 cc of 40 mg Depo-medrol were injected into the knee. The patient tolerated the injection rather well. The patient was instructed to follow up for any signs of infection. Natural history and expected course discussed. Questions answered. Reduction in offending activity. OTC analgesics as needed. The patient will follow up with me in 3 months and we will reassess her then. The patient was instructed on a home exercise program.  She was encouraged to lose weight. If the patient continues to have severe left knee pain, we will need to consider total knee arthroplasty.

## 2021-03-03 ENCOUNTER — TELEPHONE (OUTPATIENT)
Dept: ORTHOPEDIC SURGERY | Age: 56
End: 2021-03-03

## 2021-03-03 NOTE — TELEPHONE ENCOUNTER
Was seen yesterday and received a shot in knee and its still in pain,wants something called in for the pain.     [facility] [address] [phone]  Her number [phone]

## 2021-03-03 NOTE — TELEPHONE ENCOUNTER
I called the patient and informed her that it is recommended she take OTC meds, rest, ice, and give the injection more time to work.

## 2021-05-06 ENCOUNTER — OFFICE VISIT (OUTPATIENT)
Dept: ORTHOPEDIC SURGERY | Age: 56
End: 2021-05-06
Payer: COMMERCIAL

## 2021-05-06 VITALS — HEIGHT: 67 IN | WEIGHT: 280 LBS | BODY MASS INDEX: 43.95 KG/M2

## 2021-05-06 DIAGNOSIS — M17.12 PRIMARY OSTEOARTHRITIS OF LEFT KNEE: Primary | ICD-10-CM

## 2021-05-06 DIAGNOSIS — Z96.651 STATUS POST TOTAL RIGHT KNEE REPLACEMENT: ICD-10-CM

## 2021-05-06 PROCEDURE — G8417 CALC BMI ABV UP PARAM F/U: HCPCS | Performed by: ORTHOPAEDIC SURGERY

## 2021-05-06 PROCEDURE — 1036F TOBACCO NON-USER: CPT | Performed by: ORTHOPAEDIC SURGERY

## 2021-05-06 PROCEDURE — 99213 OFFICE O/P EST LOW 20 MIN: CPT | Performed by: ORTHOPAEDIC SURGERY

## 2021-05-06 PROCEDURE — 20610 DRAIN/INJ JOINT/BURSA W/O US: CPT | Performed by: ORTHOPAEDIC SURGERY

## 2021-05-06 PROCEDURE — G8427 DOCREV CUR MEDS BY ELIG CLIN: HCPCS | Performed by: ORTHOPAEDIC SURGERY

## 2021-05-06 PROCEDURE — 3017F COLORECTAL CA SCREEN DOC REV: CPT | Performed by: ORTHOPAEDIC SURGERY

## 2021-05-06 RX ORDER — METHYLPREDNISOLONE ACETATE 40 MG/ML
80 INJECTION, SUSPENSION INTRA-ARTICULAR; INTRALESIONAL; INTRAMUSCULAR; SOFT TISSUE ONCE
Status: COMPLETED | OUTPATIENT
Start: 2021-05-06 | End: 2021-05-06

## 2021-05-06 RX ORDER — BUPIVACAINE HYDROCHLORIDE 2.5 MG/ML
3 INJECTION, SOLUTION INFILTRATION; PERINEURAL ONCE
Status: COMPLETED | OUTPATIENT
Start: 2021-05-06 | End: 2021-05-06

## 2021-05-06 RX ADMIN — BUPIVACAINE HYDROCHLORIDE 7.5 MG: 2.5 INJECTION, SOLUTION INFILTRATION; PERINEURAL at 11:49

## 2021-05-06 RX ADMIN — METHYLPREDNISOLONE ACETATE 80 MG: 40 INJECTION, SUSPENSION INTRA-ARTICULAR; INTRALESIONAL; INTRAMUSCULAR; SOFT TISSUE at 11:50

## 2021-05-06 NOTE — PROGRESS NOTES
Mae Roy  <[id number]>  May 6, 2021    Chief Complaint   Patient presents with    Follow-up     Left knee           History: The patient is here in follow-up regarding her left knee. She is having severe left knee pain. The left knee injection received approximately 2 months ago provided moderate relief. Her symptoms recently returned a week ago. She does have difficulty with stairs. She does have difficulty getting up from a seated position. The patient has lost some weight since her last visit. I did perform a right total knee arthroplasty back in January 2019. She recovered rather well. The left knee issues are affecting her daily activities. She denies any numbness or tingling. The patient can no longer take anti-inflammatories as she was diagnosed with peptic ulcer disease. The patient's  past medical history, medications, allergies,  family history, social history, and review of systems have been reviewed, and dated and are recorded in the chart. Ht 5' 7\" (1.702 m)   Wt 280 lb (127 kg)   BMI 43.85 kg/m²     Physical: Ms. Mae Roy appears well, she is in no apparent distress, she demonstrates appropriate mood & affect. She is alert and oriented to person, place and time. Examination of the left lower extremity reveals no pain with range of motion of the hip. She has generalized tenderness to palpation about the joint line of the left knee. Range of motion is from -1 degrees to 115 degrees. Strength is 4+ to 5/5 for all muscle groups about the left knee. There is patellofemoral crepitus with range of motion of the left knee. Varus and valgus stressing of the knees reveals no evidence of instability. There is a small effusion in the left knee. Anterior drawer and Lachman are negative bilaterally. Examination of the skin reveals no rashes, ulceration, or lesion, bilaterally in the lower extremities. Sensation to both lower extremities is grossly intact.  Exam of both feet reveals pedal pulses intact and brisk cap refill. Patient is able to dorsiflex and wiggle all toes. Deep tendon reflexes of the lower extremities are normal and symmetric. X-rays: 4 views of the left knee obtained in the office previously were extensively reviewed. There is evidence of severe osteoarthritis. The changes are most severe medially. Impression: #1 S/p right Total Knee Arthroplasty, Doing well postoperatively. #2 severe end stage osteoarthritis of the left knee    Plan: At this time, the left knee was injected under sterile conditions. After a Betadine prep of the joint, 3 cc of 0.25% Marcaine and 2 cc of 40 mg Depo-medrol were injected into the knee. The patient tolerated the injection rather well. The patient was instructed to follow up for any signs of infection. Natural history and expected course discussed. Questions answered. Reduction in offending activity. OTC analgesics as needed. The patient will follow up with me in 2 months and we will reassess her then. The patient was instructed on a home exercise program.  She was encouraged to lose weight. If the patient continues to have severe left knee pain, we will need to consider total knee arthroplasty.

## 2021-08-17 ENCOUNTER — OFFICE VISIT (OUTPATIENT)
Dept: ORTHOPEDIC SURGERY | Age: 56
End: 2021-08-17
Payer: MEDICAID

## 2021-08-17 ENCOUNTER — PREP FOR PROCEDURE (OUTPATIENT)
Dept: ORTHOPEDIC SURGERY | Age: 56
End: 2021-08-17

## 2021-08-17 VITALS — BODY MASS INDEX: 44.42 KG/M2 | WEIGHT: 283 LBS | HEIGHT: 67 IN

## 2021-08-17 DIAGNOSIS — M17.12 PRIMARY OSTEOARTHRITIS OF LEFT KNEE: Primary | ICD-10-CM

## 2021-08-17 DIAGNOSIS — Z01.818 PREOP TESTING: Primary | ICD-10-CM

## 2021-08-17 PROCEDURE — G8427 DOCREV CUR MEDS BY ELIG CLIN: HCPCS | Performed by: ORTHOPAEDIC SURGERY

## 2021-08-17 PROCEDURE — 99214 OFFICE O/P EST MOD 30 MIN: CPT | Performed by: ORTHOPAEDIC SURGERY

## 2021-08-17 PROCEDURE — G8417 CALC BMI ABV UP PARAM F/U: HCPCS | Performed by: ORTHOPAEDIC SURGERY

## 2021-08-17 PROCEDURE — 3017F COLORECTAL CA SCREEN DOC REV: CPT | Performed by: ORTHOPAEDIC SURGERY

## 2021-08-17 PROCEDURE — 1036F TOBACCO NON-USER: CPT | Performed by: ORTHOPAEDIC SURGERY

## 2021-08-17 RX ORDER — ACETAMINOPHEN 500 MG
500 TABLET ORAL EVERY 6 HOURS PRN
Status: ON HOLD | COMMUNITY
End: 2021-10-12 | Stop reason: SDUPTHER

## 2021-08-17 RX ORDER — IBUPROFEN 200 MG
200 TABLET ORAL EVERY 6 HOURS PRN
COMMUNITY
End: 2021-09-29 | Stop reason: ALTCHOICE

## 2021-08-17 RX ORDER — HYDROCODONE BITARTRATE AND ACETAMINOPHEN 5; 325 MG/1; MG/1
1 TABLET ORAL EVERY 8 HOURS PRN
Qty: 21 TABLET | Refills: 0 | Status: SHIPPED | OUTPATIENT
Start: 2021-08-17 | End: 2021-08-24

## 2021-08-17 NOTE — LETTER
[facility]  [facility] [address] [address]  Phone: [phone]  Fax: [phone]    Olivier Badillo MD        August 17, 2021     Patient: Driss Perla   YOB: 1965   Date of Visit: 8/17/2021       To Whom It May Concern:    Please excuse Amol Yeung from work on 8/17/21 as she was seen in my office. If you have any questions or concerns, please don't hesitate to call.     Sincerely,          Olivier Badillo MD

## 2021-08-17 NOTE — PROGRESS NOTES
RAPT  RISK ASSESSMENT and PREDICTION TOOL    Name: Valarie Crawford  YOB: 1965  Surgeon: Ivelisse Garcia MD         Value Score    1). What is your age group? 50 - 65 years  = 2      66 - 75 years = 1     > 75 years = 0       Your score = 2   2). Gender? Male = 2     Female = 1       Your score = 1   3). How far on average can you walk? Two blocks or more (+/- rest) = 2    (a block is 200 gyygsl=432 ft)  1 - 2 blocks (+/- rest) = 1     Housebound (most of time) = 0       Your score = 1   4). Which gait aid do you use? None = 2    (more often than not) Single-point stick = 1     Crutches/walker = 0       Your score = 2   5). Do you use community supports? None or one per week = 1    (home help, meals on wheels, district nursing) Two or more per week = 0       Your score = 1   6). Will you live with someone who can care for you after your operation? yes = 3     no = 0       Your score = 3    Your Total Score (out of 12) = 10       Key: Destination at discharge from acute care predicted by score. Score < 6  = extended inpatient rehabilitation  Score 6 - 9  = additional intervention to discharge directly home (Rehab in the home)  Score > 9  = directly home      Patient's Preference Prediction Score Agreed destination   open 10 home   Valarie Crawford  Date: 8/17/21     Brother to be at home after surgery.

## 2021-08-17 NOTE — LETTER
[facility]: [phone]: [phone]  Surgery Scheduling Form:  DEMOGRAPHICS:                                                                                                              .    Patient Name:  Hue Hansen    Patient :  1965   Patient SS#:        Patient Phone:  [phone] (home) [phone] (work)     Patient Address:  [address] [place] [address]    Insurance:    Payor/Plan Subscr  Sex Relation Sub. Ins. ID Effective Group Num   1. [address] A 1965 Female Self [id number] 3/1/19 [id number]                                   [address] [address]   2. [address] A 1965 Female Self [id number] 5/15/20 [place]                                   [address]     DIAGNOSIS & PROCEDURE:                                                                                            .    Diagnosis:  Osteoarthritis- left knee M17.12    Operation:  Total knee replacement- left knee Robotic Assisted [id number]    [facility]:  [facility]    Surgeon:  Mara Tan    SCHEDULING INFORMATION:                                                                                         .    Requested Date:  10/11/21 OR Time: 9:40 am  Patient Arrival Time: 7:40am     OR Time Required:  120  Minutes         Anesthesia:  General    SA Required:  Yes x 2    Equipment:  Milady VIOLA Advanced    Status:  same day admit    PAT Required:  Yes COVID19 test:  Vaccinated     Latex Allergy:  no Defibrilator or Pacemaker:  no    Isolation Precautions:  no                      Napoleon Almanza MD     21   BILLING INFORMATION:                                                                                                    .                          CPT Code Modifier  Total knee    Prior auth: pending  Name: Hue Hansen  : 1965  Procedure:  Total knee replacement- left         Date of Surgery:10/11/21             Date of JET:21    Allergies: op.    Justa ] Apply knee high antiembolic hose and pneumonboots to unoperative leg   D/C after 2 weeks    Other order: Mobic 15mg pre-op     Justa ] Type and screen    T.O: _____________________________/___________________Date:_______Time:_______   Physician    RN    Physician Signature:               Date/Time:  8/17/2021 3:42 PM

## 2021-08-17 NOTE — PROGRESS NOTES
Carley Kennedy  <[id number]>  August 17, 2021    Chief Complaint   Patient presents with    Follow-up     Left knee. Rec'd cortisone injection on 5/6/21. History: The patient is a 80-year-old female who is here for evaluation of her left knee. She last received a left knee injection approximately 3 months ago. She reports mild improvement in her symptoms. She did undergo a right total knee arthroplasty in the remote past and recovered rather well. She has tried therapy for the left knee, but continues to have severe pain. She rates her pain as 9-10/10. She has difficulty with stairs. She has difficulty squatting. She does feel that the left knee issues are affecting her right knee. She has been working on weight loss. The patient's  past medical history, medications, allergies,  family history, social history, and have been reviewed, and dated and are recorded in the chart. Pertinent items are noted in HPI. Review of systems reviewed from Pertinent History Form dated on 8/17/2021 and available in the patient's chart under the Media tab. Vitals:  Ht 5' 7\" (1.702 m)   Wt 283 lb (128.4 kg)   BMI 44.32 kg/m²     Physical: Ms. Carley Kennedy appears well, she is in no apparent distress, she demonstrates appropriate mood & affect. She is alert and oriented to person, place and time. Examination of the left lower extremity reveals no pain with range of motion of the hip. She has generalized tenderness to palpation about the joint line of the left knee. Range of motion is from 0 degrees to 115 degrees. Strength is 4+ to 5/5 for all muscle groups about the left knee. There is patellofemoral crepitus with range of motion of the left knee. Varus and valgus stressing of the knees reveals no evidence of instability. There is a small effusion in the left knee. Anterior drawer and Lachman are negative bilaterally.    Examination of the skin reveals no rashes, ulceration, or lesion, bilaterally in the lower extremities. Sensation to both lower extremities is grossly intact. Exam of both feet reveals pedal pulses intact and brisk cap refill. Patient is able to dorsiflex and wiggle all toes. Deep tendon reflexes of the lower extremities are normal and symmetric. X-rays: 4 views of the left knee obtained in the office previously were extensively reviewed. The x-rays confirm severe osteoarthritis. Impression: Left knee osteoarthritis    Plan: At this time, the patient will be scheduled for a left total knee arthroplasty. All risks including but not limited to blood loss, infection, persistent pain,stiffness, weakness,loosening,deep vein thrombosis,neurovascular injury, and the risks of anesthesia were discussed. The patient understands all risks and benefits of the procedure and agrees to proceed. The patient will see her primary care Di Chang RN, for medical clearance. If the patient is on NSAIDS or blood thinners these medications will need to be discontinued one week prior to surgery. Will plan on 81mg ASA BID for 14 days post-op.   Patient was given a prescription for Norco.

## 2021-08-27 ENCOUNTER — TELEPHONE (OUTPATIENT)
Dept: ORTHOPEDIC SURGERY | Age: 56
End: 2021-08-27

## 2021-08-27 NOTE — TELEPHONE ENCOUNTER
Prescription Refill     Medication Name:  [address]  Patient Contact Number:  [phone]

## 2021-08-27 NOTE — TELEPHONE ENCOUNTER
General Question     Subject: DR'S NOTE  Patient and /or Facility Request:PATIENT LAST DAY OF WORK IS 9-30-21   Contact Number: [phone]

## 2021-08-27 NOTE — TELEPHONE ENCOUNTER
I called patient and left message letting her know that the note and the medication request will be done on Tuesday. Dr Virgil Pacheco is out this afternoon.

## 2021-08-27 NOTE — LETTER
[facility]  [place] [address] [address] [address]  Phone: [phone]  Fax: [phone]    Olivier Badillo MD        August 30, 2021     Patient: Driss Perla   YOB: 1965   Date of Visit: 8/27/2021       To Whom It May Concern:    Amol Yeung will undergo knee surgery in 10/4/21. She will remain off work starting 9/30/21 for approximately 10-12 weeks. If you have any questions or concerns, please don't hesitate to call.     Sincerely,          Olivier Badillo MD

## 2021-08-27 NOTE — TELEPHONE ENCOUNTER
I called patient and left message letting her know this request will be seen by Dr Opal Pena  on Tuesday

## 2021-08-30 ENCOUNTER — TELEPHONE (OUTPATIENT)
Dept: ORTHOPEDIC SURGERY | Age: 56
End: 2021-08-30

## 2021-08-30 RX ORDER — IBUPROFEN 800 MG/1
800 TABLET ORAL 2 TIMES DAILY WITH MEALS
Qty: 60 TABLET | Refills: 1 | Status: ON HOLD | OUTPATIENT
Start: 2021-08-30 | End: 2021-10-11 | Stop reason: SDUPTHER

## 2021-08-30 NOTE — TELEPHONE ENCOUNTER
General Question     Subject: MEDICATION QUESTION  Patient and /or Facility Request: Carson Ramos  Contact Number: [phone]    PATIENT STATED THAT SHE JUST SPOKE TO [facility] AND WANTED TO ASK HER IF SHE CAN TAKE A PRESCRIPTION MOTRIN 800 AS NEEDED TO TRY TO KEEP THE SWELLING DAY. PLEASE CALL BACK AT THE ABOVE NUMBER.

## 2021-08-30 NOTE — LETTER
[address] [facility]  Jelly Hugo [address] [place] [address]  Phone: [phone]  Fax: [phone]    Marco Birmingham MD        August 31, 2021     Patient: Dheeraj Benavides   YOB: 1965   Date of Visit: 8/30/2021       To Whom It May Concern:    Santos Gilmore will undergo knee surgery in 10/4/21. She will remain off work starting 9/30/21 for approximately 10-12 weeks. If you have any questions or concerns, please don't hesitate to call.     Sincerely,          Marco Birmingham MD

## 2021-08-30 NOTE — TELEPHONE ENCOUNTER
I spoke with the patient and informed her that her work note is ready.  She also requested to change her leg length x-ray date to 9/17/21 at 2 pm.

## 2021-08-30 NOTE — TELEPHONE ENCOUNTER
General Question     Subject: Patient is calling to see if she can  the Work Note that states when her surgery is and how long she will be off. She wanted to pick this up tomorrow at the [facility] office told her we are only there in the morning. Please call and let patient know if they will print it off there.     Patient Jeramie Aden Number: [phone]

## 2021-08-30 NOTE — LETTER
[facility]  [facility] [address] [address]  Phone: [phone]  Fax: [phone]    Linda Chin MD        August 31, 2021     Patient: Ryland Michelle   YOB: 1965   Date of Visit: 8/30/2021       To Whom It May Concern:    Laura Crain will undergo knee surgery in 10/4/21. She will remain off work starting 9/28/21 for approximately 10-12 weeks. If you have any questions or concerns, please don't hesitate to call.     Sincerely,          Linda Chin MD

## 2021-08-30 NOTE — TELEPHONE ENCOUNTER
General Question     Subject:PT NEEDS HER EXCUSE TO BE OFF WORK ON 09/28 RATHER THAN 09/30. IF YOU HAVE ANY QUESTIONS PLEASE CALL.   Patient and /or Facility Request: [address] [place] [address],[facility] Number: [phone]

## 2021-09-09 ENCOUNTER — TELEPHONE (OUTPATIENT)
Dept: ORTHOPEDIC SURGERY | Age: 56
End: 2021-09-09

## 2021-09-09 NOTE — TELEPHONE ENCOUNTER
I attempted to call the patient. The phone rang and message stated \"the caller you are trying to reach is not accepting calls at this time\". The patients surgery time has changed. She needs to arrive at [facility] at 10:05am for a 12:05pm surgery.

## 2021-09-10 ENCOUNTER — TELEPHONE (OUTPATIENT)
Dept: ORTHOPEDIC SURGERY | Age: 56
End: 2021-09-10

## 2021-09-10 NOTE — TELEPHONE ENCOUNTER
I left a message for the patient to call back. I spoke with her on 8/30/21 regarding changing her leg length x-ray date.  She is currently scheduled for 9/17/21 at 2 pm.

## 2021-09-10 NOTE — TELEPHONE ENCOUNTER
General Question     Subject: PT NEEDS TO CX HER XR FOR TODAY. SHE CAN COME NEXT Friday AROUND 1:00.   Patient and /or Facility Request: [address] [place] [address],Third Floor Number: [phone]

## 2021-09-13 ENCOUNTER — TELEPHONE (OUTPATIENT)
Dept: ORTHOPEDIC SURGERY | Age: 56
End: 2021-09-13

## 2021-09-13 NOTE — TELEPHONE ENCOUNTER
I left a message for the patient to call back. I need to confirm that she is getting her leg length x-ray on 9/17/21 at 2 pm.    There has been a change with her surgery time. I have left a previous message regarding this with no call back. She needs to arrive at [facility] on 10/4/21 at 10:00 am for a 12:00 pm surgery. She also needs to reschedule her post op visit with Dr. Nadia Hogan on 10/19/21 as he will not be in the office at that time.

## 2021-09-13 NOTE — TELEPHONE ENCOUNTER
Patient called, gave new date and appt for xray, she confirmed. Call patient if you have any questions.

## 2021-09-14 ENCOUNTER — TELEPHONE (OUTPATIENT)
Dept: ORTHOPEDIC SURGERY | Age: 56
End: 2021-09-14

## 2021-09-14 NOTE — TELEPHONE ENCOUNTER
FAXED COMPLETED FMLA TO [facility] [employer] AT [facility] @ [phone] PER PATIENT SIGNED RELEASE

## 2021-09-15 NOTE — TELEPHONE ENCOUNTER
I attempted to call the patient at [phone]. A lady answered stating this is not a phone number for Bree Toure. I tried calling a previous phone number for the patient, [phone] and the message states \"the person you are calling cannot accept calls at this time. \" I am unable to contact the patient at this time. If the patient calls back, please get an updated phone number that works.

## 2021-09-20 ENCOUNTER — HOSPITAL ENCOUNTER (OUTPATIENT)
Dept: PREADMISSION TESTING | Age: 56
Discharge: HOME | End: 2021-09-24
Payer: COMMERCIAL

## 2021-09-20 DIAGNOSIS — Z01.818 PREOP TESTING: ICD-10-CM

## 2021-09-20 LAB
ABO/RH: NORMAL
ALBUMIN SERPL-MCNC: 4.1 G/DL (ref 3.4–5)
ANION GAP SERPL CALCULATED.3IONS-SCNC: 14 MMOL/L (ref 3–16)
ANTIBODY SCREEN: NORMAL
BASOPHILS ABSOLUTE: 0 K/UL (ref 0–0.2)
BASOPHILS RELATIVE PERCENT: 0.3 %
BILIRUBIN URINE: NEGATIVE
BLOOD, URINE: NEGATIVE
BUN BLDV-MCNC: 12 MG/DL (ref 7–20)
CALCIUM SERPL-MCNC: 9.2 MG/DL (ref 8.3–10.6)
CHLORIDE BLD-SCNC: 103 MMOL/L (ref 99–110)
CLARITY: ABNORMAL
CO2: 24 MMOL/L (ref 21–32)
COLOR: YELLOW
CREAT SERPL-MCNC: 0.9 MG/DL (ref 0.6–1.1)
EKG ATRIAL RATE: 82 BPM
EKG DIAGNOSIS: NORMAL
EKG P AXIS: 52 DEGREES
EKG P-R INTERVAL: 152 MS
EKG Q-T INTERVAL: 398 MS
EKG QRS DURATION: 82 MS
EKG QTC CALCULATION (BAZETT): 464 MS
EKG R AXIS: 29 DEGREES
EKG T AXIS: 47 DEGREES
EKG VENTRICULAR RATE: 82 BPM
EOSINOPHILS ABSOLUTE: 0.1 K/UL (ref 0–0.6)
EOSINOPHILS RELATIVE PERCENT: 1.6 %
EPITHELIAL CELLS, UA: 1 /HPF (ref 0–5)
ESTIMATED AVERAGE GLUCOSE: 119.8 MG/DL
GFR AFRICAN AMERICAN: >60
GFR NON-AFRICAN AMERICAN: >60
GLUCOSE BLD-MCNC: 90 MG/DL (ref 70–99)
GLUCOSE URINE: NEGATIVE MG/DL
HBA1C MFR BLD: 5.8 %
HCT VFR BLD CALC: 35 % (ref 36–48)
HEMOGLOBIN: 11.5 G/DL (ref 12–16)
HYALINE CASTS: 0 /LPF (ref 0–8)
INR BLD: 1.06 (ref 0.88–1.12)
KETONES, URINE: NEGATIVE MG/DL
LEUKOCYTE ESTERASE, URINE: NEGATIVE
LYMPHOCYTES ABSOLUTE: 2.3 K/UL (ref 1–5.1)
LYMPHOCYTES RELATIVE PERCENT: 54.3 %
MCH RBC QN AUTO: 28.7 PG (ref 26–34)
MCHC RBC AUTO-ENTMCNC: 32.7 G/DL (ref 31–36)
MCV RBC AUTO: 87.6 FL (ref 80–100)
MICROSCOPIC EXAMINATION: YES
MONOCYTES ABSOLUTE: 0.3 K/UL (ref 0–1.3)
MONOCYTES RELATIVE PERCENT: 6.3 %
MRSA SCREEN RT-PCR: NORMAL
NEUTROPHILS ABSOLUTE: 1.6 K/UL (ref 1.7–7.7)
NEUTROPHILS RELATIVE PERCENT: 37.5 %
NITRITE, URINE: NEGATIVE
PDW BLD-RTO: 13.8 % (ref 12.4–15.4)
PH UA: 6.5 (ref 5–8)
PLATELET # BLD: 220 K/UL (ref 135–450)
PMV BLD AUTO: 8.5 FL (ref 5–10.5)
POTASSIUM SERPL-SCNC: 3.7 MMOL/L (ref 3.5–5.1)
PROTEIN UA: NEGATIVE MG/DL
PROTHROMBIN TIME: 12 SEC (ref 9.9–12.7)
RBC # BLD: 3.99 M/UL (ref 4–5.2)
RBC UA: 0 /HPF (ref 0–4)
SODIUM BLD-SCNC: 141 MMOL/L (ref 136–145)
SPECIFIC GRAVITY UA: 1.01 (ref 1–1.03)
URINE REFLEX TO CULTURE: ABNORMAL
URINE TYPE: ABNORMAL
UROBILINOGEN, URINE: 0.2 E.U./DL
WBC # BLD: 4.3 K/UL (ref 4–11)
WBC UA: 0 /HPF (ref 0–5)

## 2021-09-20 PROCEDURE — 80048 BASIC METABOLIC PNL TOTAL CA: CPT

## 2021-09-20 PROCEDURE — 83036 HEMOGLOBIN GLYCOSYLATED A1C: CPT

## 2021-09-20 PROCEDURE — 82040 ASSAY OF SERUM ALBUMIN: CPT

## 2021-09-20 PROCEDURE — 85610 PROTHROMBIN TIME: CPT

## 2021-09-20 PROCEDURE — 93010 ELECTROCARDIOGRAM REPORT: CPT | Performed by: INTERNAL MEDICINE

## 2021-09-20 PROCEDURE — 86901 BLOOD TYPING SEROLOGIC RH(D): CPT

## 2021-09-20 PROCEDURE — 86900 BLOOD TYPING SEROLOGIC ABO: CPT

## 2021-09-20 PROCEDURE — 93005 ELECTROCARDIOGRAM TRACING: CPT

## 2021-09-20 PROCEDURE — 87641 MR-STAPH DNA AMP PROBE: CPT

## 2021-09-20 PROCEDURE — 81001 URINALYSIS AUTO W/SCOPE: CPT

## 2021-09-20 PROCEDURE — 85025 COMPLETE CBC W/AUTO DIFF WBC: CPT

## 2021-09-20 PROCEDURE — 86850 RBC ANTIBODY SCREEN: CPT

## 2021-09-20 NOTE — PROGRESS NOTES
Patient attended JET class on 9/20/21. Patient verified surgery for Total knee replacement and received patient information and educational JET folder including education handouts on covid-19 restrictions, ERAS, hand hygiene and preventing constipation. Interviews completed by PT, OT, and PAT. Labs and Tests completed as ordered/necessary. Patient given handout instructions on Pre-operative Showering techniques and the use of anti-septic 3 days before surgery. Anti-septic bottle given to patient to take home. Patient states no further questions or concerns. Patient provided orthopedic office and nurse navigator contact information. Patient provided with home health care agency list and skilled nursing facility list.    DOS: 10/4/21  Dr Carol Cortes: home with [facility] vs to mother's Claudette Morita) home with c ;if applicable. Per Patient Will see/Saw PCP on 9/23/21.    Electronically signed by Chandrika Eason RN on 9/20/2021 at 2:08 PM

## 2021-09-22 DIAGNOSIS — M17.12 PRIMARY OSTEOARTHRITIS OF LEFT KNEE: Primary | ICD-10-CM

## 2021-09-23 ENCOUNTER — TELEPHONE (OUTPATIENT)
Dept: ORTHOPEDIC SURGERY | Age: 56
End: 2021-09-23

## 2021-09-23 NOTE — TELEPHONE ENCOUNTER
General Question     Subject: patient wants to know if Dr Virgil Pacheco received her pre-op physical paperwork from her dr? Ann-Marie Molina RETURNED [place] [address]?   Patient and /or Facility Request: patient  Contact Number: [phone]

## 2021-09-23 NOTE — TELEPHONE ENCOUNTER
I called the patient. She said she saw her doctor today for her pre op H&P. I informed her I do not have a copy that was faxed and do not see it in [facility]. I will continue to check for this. If I do not see a copy in her chart I will call her doctor office at [phone].

## 2021-09-24 ENCOUNTER — HOSPITAL ENCOUNTER (OUTPATIENT)
Dept: MRI IMAGING | Age: 56
Discharge: HOME | End: 2021-09-24
Payer: COMMERCIAL

## 2021-09-24 DIAGNOSIS — M17.12 PRIMARY OSTEOARTHRITIS OF LEFT KNEE: ICD-10-CM

## 2021-09-24 PROCEDURE — 73721 MRI JNT OF LWR EXTRE W/O DYE: CPT

## 2021-09-27 ENCOUNTER — TELEPHONE (OUTPATIENT)
Dept: ORTHOPEDIC SURGERY | Age: 56
End: 2021-09-27

## 2021-09-27 ENCOUNTER — PREP FOR PROCEDURE (OUTPATIENT)
Dept: ORTHOPEDICS UNIT | Age: 56
End: 2021-09-27

## 2021-09-27 RX ORDER — MELOXICAM 7.5 MG/1
15 TABLET ORAL ONCE
Status: CANCELLED | OUTPATIENT
Start: 2021-09-27 | End: 2021-09-27

## 2021-09-27 NOTE — TELEPHONE ENCOUNTER
General Question     Subject: PATIENT HAS AN UPPER RESPIRATORY INFECTION AND IS TAKING ANTIBIOTICS.   SHE WOULD LIKE TO KNOW IF SHE NEEDS TO REARRANGE THE SURGERY DATE. - LEFT KNEE   Patient: Fariha Tracey   Contact Number: [phone]

## 2021-09-28 NOTE — TELEPHONE ENCOUNTER
Per Dr. Claudine Breaux, he is fine with proceeding with surgery as long as her symptoms don't worsen. I will call her Thursday to check on her symptoms.

## 2021-09-28 NOTE — TELEPHONE ENCOUNTER
I spoke with the patient. She states she started with a cough and sore throat on 9/23/21. She went to the ER yesterday and was told she has an upper respiratory. She was tested yesterday for COVID and was negative. She was given a steroid shot and a Z fran. She is feeling better. I informed her I would speak to Dr. Mervat Shrestha and call her back.

## 2021-09-29 RX ORDER — AZITHROMYCIN 250 MG/1
250 TABLET, FILM COATED ORAL DAILY
COMMUNITY
End: 2021-10-07 | Stop reason: ALTCHOICE

## 2021-09-29 RX ORDER — DOCUSATE SODIUM 100 MG/1
100 CAPSULE, LIQUID FILLED ORAL 2 TIMES DAILY
COMMUNITY

## 2021-09-29 RX ORDER — OMEPRAZOLE 20 MG/1
20 CAPSULE, DELAYED RELEASE ORAL DAILY
COMMUNITY

## 2021-09-29 RX ORDER — ALBUTEROL SULFATE 90 UG/1
2 AEROSOL, METERED RESPIRATORY (INHALATION) EVERY 4 HOURS PRN
COMMUNITY

## 2021-09-29 NOTE — PROGRESS NOTES
Patient states is currently on antibiotics for upper respiratory infection-states had to go to er for this-dr. José Miguel Hi aware per patient-per patient-dr. Gary Warren office will be calling patient on 9/30/2021 to see how she is doing

## 2021-09-29 NOTE — PROGRESS NOTES
C-Difficile admission screening and protocol:     * Admitted with diarrhea? *Prior history of C-Diff. In last 3 months? *Antibiotic use in the past 6-8 weeks? *Prior hospitalization or nursing home in the last month? C-Difficile admission screening and protocol:       * Admitted with diarrhea? [] YES    [x]  NO     *Prior history of C-Diff. In last 3 months? [] YES    [x]  NO     *Antibiotic use in the past 6-8 weeks? []  NO    [x]  YES                 If yes, which ANTIBIOTIC AND REASON___azithromycin-upper respiratory infection___     *Prior hospitalization or nursing home in the last month?  []  YES    [x]  NO

## 2021-09-29 NOTE — PROGRESS NOTES
Preoperative Screening for Elective Surgery/Invasive Procedures While COVID-19 present in the community     Have you tested positive or have been told to self-isolate for COVID-19 like symptoms within the past 28 days? NO   Do you currently have any of the following symptoms? NO  o Fever >100.0 F or 99.9 F in immunocompromised patients? NO  o New onset cough, shortness of breath or difficulty breathing? NO  o New onset sore throat, myalgia (muscle aches and pains), headache, loss of taste/smell or diarrhea? NO   Have you had a potential exposure to COVID-19 within the past 14 days by: NO  o Close contact with a confirmed case? NO  o Close contact with a healthcare worker,  or essential infrastructure worker (grocery store, [employer], gas station, public utilities or transportation)? YES-PCP AND ER  o Do you reside in a congregate setting such as; skilled nursing facility, adult home, correctional facility, homeless shelter or other institutional setting? NO  o Have you had recent travel to a known COVID-19 hotspot? NO    Indicate if the patient has a positive screen by answering yes to one or more of the above questions. Patients who test positive or screen positive prior to surgery or on the day of surgery should be evaluated in conjunction with the surgeon/proceduralist/anesthesiologist to determine the urgency of the procedure.

## 2021-09-29 NOTE — PROGRESS NOTES
PRE-OP INSTRUCTIONS     · Do not eat or drink anything after 12:00 midnight prior to surgery. This includes water, chewing gum, mints and ice chips. You may brush your teeth and gargle the morning of surgery but DO  NOT SWALLOW THE WATER. Take the following medications with a small sip of water on the morning of surgery: Follow your MD/Surgeons pre-procedure instructions regarding your medications    · If you use an inhaler, please use it the morning of surgery and bring with you to hospital. albuterol    · You may be asked to stop blood thinners such as:  Coumadin, Plavix, Fragmin and lovenox. Please check with your doctor before stopping these or any other medications. · Aspirin, ibuprofen, advil and naproxen, any anti-inflammatory products should be stopped for a week prior to your surgery. · Do not smoke and do not drink any alcoholic beverages 24 hours prior to your surgery. · Please do not wear any jewelry or body piercings on the day of surgery. · Please wear something simple, loose fitting clothing to the hospital.  Do not wear any make-up(including eye make-up) or nail polish on your fingers and toes. · As part of our patient safety program to minimize surgical infections, we ask you to do the followin. Please notify your surgeon if you develop any illness between now                          and the day of your surgery. This includes a cough, cold, fever, sore                       throat, nausea, vomiting, diarrhea, etc.  Also please notify your                                  surgeon if you experience dizziness, shortness of breath or                       Blurred vision between now and the time of your surgery.                    2.  Please notify your surgeon of any open or redden areas that may                        look infected                     3.  DO NOT shave your operative site 96 hours(four days) prior to surgery. 4.  Shower the week before surgery with an antibacterial soap, such as                       dial, safeguard, etc.                         5.  Three(3) days prior to your surgery, cleanse the operative site with 9/30,10/1,10/2                         Hibiclens(anti-microbial soap). This soap may dry your skin, please                          do not apply any oils or lotions     · Please bring your insurance card and picture ID day of surgery    · If you have a living will or durable power of attorny. Please bring in a copy of your advanced directives. · If you have dentures, they will be removed before going to the OR, we will provide you with a container. If you wear contact lenses or glasses, they will be removes, please bring a case for them. · Have you seen your family doctor for a pre-op history and physical.      · Surgery scheduler will call you 48 hours prior to your surgery to notify you of the time of your surgery and the time you will need to be at hospital...patients are asked to arrive 21/2 hours prior to surgery. ·  Please call Pre-Admission testing if you have any further questions. [id number] [facility] testing phone number:  [phone]      Thank You for choosing [facility]!!    SAFETY FIRST. .call before you fall

## 2021-09-29 NOTE — PROGRESS NOTES
[facility] called to fax pre-op done on 9/23/2021 and also left message regarding patient was started on antibiotics for upper respiratory infection on 9/27/2021

## 2021-09-30 NOTE — TELEPHONE ENCOUNTER
The patient still has a cough and would like to postpone surgery until 10/11/21. She was informed I will call her this afternoon with her surgery time.

## 2021-09-30 NOTE — TELEPHONE ENCOUNTER
I called the patient and informed her she is scheduled for 10/11/21 at 12 pm with an arrival time of 10 am.

## 2021-10-01 NOTE — TELEPHONE ENCOUNTER
The patient was informed her surgery time has changed. She now needs to arrive at 7:40 am for a 9:40 am surgery on 10/11/21.

## 2021-10-04 ENCOUNTER — TELEPHONE (OUTPATIENT)
Dept: ORTHOPEDIC SURGERY | Age: 56
End: 2021-10-04

## 2021-10-04 NOTE — TELEPHONE ENCOUNTER
I spoke with the patient and informed her it is best to have them faxed or drop them off to our office. She said she will drop them off to the [facility] office as that is closest to her.

## 2021-10-04 NOTE — TELEPHONE ENCOUNTER
Surgery and/or Procedure Scheduling     Contact Name: Augustina Martinez  Surgical/Procedure Request: Total Knee Replacement on 10/11/21  Patient Contact Number: [phone]    Patient is asking if she should bring her insurance papers for her  Short Term Disability with her on Monday or do you need for her to faxed them to the doctor office.

## 2021-10-05 NOTE — DISCHARGE INSTR - COC
[facility]) Continuity of Care Form    Patient Name:  Petrona Torrez  : 1965    MRN:  [id number]    Admit date:  (Not on file)  Discharge date:  10/12/21    Code Status Order: Prior  Advance Directives: No    Admitting Physician: Jaclyn Butler MD  PCP: Javi Tejeda RN    Discharging Nurse: [facility] Unit/Room#: No information available for this encounter. Discharging Unit Phone Number: [phone]    Emergency Contact:        Past Surgical History:  Past Surgical History:   Procedure Laterality Date    DENTAL SURGERY      teeth removed    TOTAL KNEE ARTHROPLASTY Right 2019    TOTAL KNEE REPLACEMENT RIGHT KNEE (ADVANCED) performed by Jaclyn Butler MD at [place] [address]       Immunization History:   Immunization History   Administered Date(s) Administered    COVID-19, Moderna, PF, 100mcg/0.5mL 03/10/2021, 2021    PPD Test 10/19/2018, 10/26/2018    Pneumococcal Polysaccharide ([id number]) 2015    Tdap (Boostrix, Adacel) 2013       Active Problems:  Active Problems:    * No active hospital problems. *  Resolved Problems:    * No resolved hospital problems. *      Isolation/Infection:       Nurse Assessment:  Last Vital Signs:Ht 5' 7\" (1.702 m)   Wt 280 lb (127 kg)   BMI 43.85 kg/m²   Last documented pain score (0-10 scale):    Last Weight:   Wt Readings from Last 1 Encounters:   21 283 lb (128.4 kg)     Mental Status:  oriented and alert     IV Access:  - None    Nursing Mobility/ADLs:  Walking   Assisted  Transfer  Assisted  Bathing  assisted  Dressing  Assisted  Toileting  [facility]   whole    Wound Care Documentation and Therapy:  Keep glued Prineo dressing clean and intact. DO NOT remove. This is waterproof for showering. Please do not use lotion on dressing. The separate lower tan dressing can be removed in 2 days and no dressing is needed on the small wound.    Removal of Prineo dressing will be discussed at postop visit in 2 weeks at office. Negative Pressure Wound Therapy Knee Right (Active)   Number of days: 1000        Elimination:  Urinary Catheter: None   Colostomy/Ileostomy: No  Continence:   · Bowel: Yes  · Bladder: Yes  Date of Last BM: 10/11/21    Intake/Output Summary (Last 24 hours)   No intake or output data in the 24 hours ending 10/05/21 1152  Safety Concerns: At Risk for Falls    Impairments/Disabilities:      Vision    Nutrition Therapy:  Current Nutrition Therapy: No diet orders on file  Routes of Feeding: Oral  Liquids: No Restrictions  Daily Fluid Restriction: no  Last Modified Barium Swallow with Video (Video Swallowing Test): not done    Treatments at the Time of Hospital Discharge:   Respiratory Treatments:   Oxygen Therapy:  is not on home oxygen therapy. Ventilator:    - No ventilator support    Lab orders for discharge:        Rehab Therapies: Physical Therapy, Occupational Therapy and nursing care. See pt 4-5 times a week for the first week then 3 times a week thereafter. PT SOC approved  Weight Bearing Status/Restrictions: No weight bearing restirctions  Other Medical Equipment (for information only, NOT a DME order):  Rolling walker  Other Treatments: Anticoagulation medications must be taken as ordered, Bilateral knee high PEYMAN hose for 2 weeks after surgery. Please review application of PEYMAN hose with pt.    HOME HEALTH CARE: LEVEL 1 STANDARD    Home health agency to establish plan of care for patient over 60 day period   [address] Initial home SN evaluation visit to occur within 24-48 hours for:  1)  medication management  2)  VS and clinical assessment  3)  S&S chronic disease exacerbation education + when to contact MD/NP  4)  care coordination   Medication Reconciliation during 1st SN visit     PT/OT    Evaluate with goal of regaining prior level of functioning    OT to evaluate if patient has [address] needs for personal care     PCP Visit scheduled within 7 days of hospital discharge    Telehealth-Homecare Vitals(If patient is agreeable and meets guidelines)      Patient's personal belongings (please select all that are sent with patient):  Glasses    RN SIGNATURE:  Electronically signed by Arnel Valdivia RN on 10/11/21 at 4:45 PM EDT    PHYSICIAN SECTION    Prognosis: Good    Condition at Discharge: Stable    Rehab Potential (if transferring to Rehab): Good    Physician Certification: I certify the above orders, information, and transfer of Valentín Yost is necessary for the continuing treatment of the diagnosis listed and that he requires [address] for less 30 days. Update Admission H&P: No change in H&P    PHYSICIAN SIGNATURE:  Electronically signed by SHASTA Box CNP on 10/11/21 at 11:52 AM EDT/ Dr Shirlnee Corea Status Date: 10/11/21 -OBS    [facility] Readmission Risk Assessment Score:    Discharging to Facility/ Agency   · Name:  [facility]    · Address: [address] [place] [address], [address] [place] [address], [place] [address]  · Phone: [phone]  · Fax: [phone]     / signature: Electronically signed by Anne-Marie Adhikari on 10/12/21 at 8:05 AM EDT            DISCHARGE INSTRUCTIONS FOR TOTAL JOINT REPLACEMENT  Activity:   Elevate your leg if swelling occurs in your ankle. Use elastic wraps/hose until swelling decreases.  Continue the exercise program as prescribed by physical therapists.  Take frequent walks.  Use walker, crutches, or cane with weight bearing instructions as indicated by the physical therapists.  Take rest periods often. Elevate leg during rest period. Hip Replacement Only: Follow these instructions for a minimum of 3 months or until instructed by Dr Florentin Easley.  Avoid sitting in low chairs or toilets without raised seats.  Keep knees apart. Sleep with a pillow between your legs.    Do not cross your legs, especially when putting on shoes and socks.  WOUND CARE:Do not scrub wound. Pat it dry with a soft towel.  Dont apply any lotions or creams to your wound.  Check the incision every day for redness, swelling, or increase in drainage. Diet:   You can resume your normal diet. There are no limits on your diet due to your surgery.  Pain pills and activity changes may lead to constipation. To prevent this, use prune juice or bran cereals liberally. You may need to use a laxative such as Dulcolax, Senokot, or Milk of Magnesia.  Drink plenty of fluids. Medications:   Take pain pills as ordered to maintain comfort.  Never drive while taking pain medicine.  Avoid over the counter medications until checking with your doctor.  Resume previous medications as instructed by your doctor.  Take blood thinners as directed and until completed. Call Your Doctor If:  Adrien George have increased pain not controlled by medications.  Excessive swelling in your ankle.  You develop numbness, tingling, or decreased movement.  You have a fever greater than 100 degrees for a day or over 101 degrees at any one time.  Your wound becomes more reddened, starts draining, or opens.  If you fall. You have any questions about your recovery. ? Inform your family doctor/dentist or any other doctor who cares for you in the future that you have a joint replacement. They may want to order antibiotics for dental or surgical procedures. ? If you have required the use of insulin to control your blood sugar after surgery, follow up with your family doctor. ? Call your surgeons office to schedule your appointment to be seen 2 to 3 weeks after surgery. ? Make your appointment to continue physical therapy per doctors orders. ?  Smoking cessation assistance can be obtained from your family doctor or by calling [place] @ [phone]    _______________________________   _____   _______________________  ____

## 2021-10-06 ENCOUNTER — TELEPHONE (OUTPATIENT)
Dept: ORTHOPEDIC SURGERY | Age: 56
End: 2021-10-06

## 2021-10-06 ENCOUNTER — PREP FOR PROCEDURE (OUTPATIENT)
Dept: ORTHOPEDICS UNIT | Age: 56
End: 2021-10-06

## 2021-10-06 RX ORDER — MELOXICAM 7.5 MG/1
15 TABLET ORAL ONCE
Status: CANCELLED | OUTPATIENT
Start: 2021-10-06 | End: 2021-10-06

## 2021-10-08 ENCOUNTER — ANESTHESIA EVENT (OUTPATIENT)
Dept: OPERATING ROOM | Age: 56
End: 2021-10-08
Payer: COMMERCIAL

## 2021-10-11 ENCOUNTER — HOSPITAL ENCOUNTER (OUTPATIENT)
Age: 56
Setting detail: OBSERVATION
Discharge: HOME HEALTH | End: 2021-10-12
Attending: ORTHOPAEDIC SURGERY | Admitting: ORTHOPAEDIC SURGERY
Payer: COMMERCIAL

## 2021-10-11 ENCOUNTER — ANESTHESIA (OUTPATIENT)
Dept: OPERATING ROOM | Age: 56
End: 2021-10-11
Payer: COMMERCIAL

## 2021-10-11 ENCOUNTER — APPOINTMENT (OUTPATIENT)
Dept: GENERAL RADIOLOGY | Age: 56
End: 2021-10-11
Attending: ORTHOPAEDIC SURGERY
Payer: COMMERCIAL

## 2021-10-11 VITALS
RESPIRATION RATE: 22 BRPM | SYSTOLIC BLOOD PRESSURE: 148 MMHG | OXYGEN SATURATION: 99 % | DIASTOLIC BLOOD PRESSURE: 98 MMHG

## 2021-10-11 DIAGNOSIS — M17.12 PRIMARY OSTEOARTHRITIS OF LEFT KNEE: Primary | ICD-10-CM

## 2021-10-11 DIAGNOSIS — M17.12 OSTEOARTHRITIS OF LEFT KNEE, UNSPECIFIED OSTEOARTHRITIS TYPE: ICD-10-CM

## 2021-10-11 LAB
ABO/RH: NORMAL
ANTIBODY SCREEN: NORMAL
GLUCOSE BLD-MCNC: 155 MG/DL (ref 70–99)
GLUCOSE BLD-MCNC: 172 MG/DL (ref 70–99)
GLUCOSE BLD-MCNC: 247 MG/DL (ref 70–99)
PERFORMED ON: ABNORMAL
SARS-COV-2, NAAT: NOT DETECTED

## 2021-10-11 PROCEDURE — 96376 TX/PRO/DX INJ SAME DRUG ADON: CPT

## 2021-10-11 PROCEDURE — 6360000002 HC RX W HCPCS: Performed by: ORTHOPAEDIC SURGERY

## 2021-10-11 PROCEDURE — 6370000000 HC RX 637 (ALT 250 FOR IP): Performed by: ORTHOPAEDIC SURGERY

## 2021-10-11 PROCEDURE — 86901 BLOOD TYPING SEROLOGIC RH(D): CPT

## 2021-10-11 PROCEDURE — 3700000000 HC ANESTHESIA ATTENDED CARE: Performed by: ORTHOPAEDIC SURGERY

## 2021-10-11 PROCEDURE — 2580000003 HC RX 258: Performed by: NURSE ANESTHETIST, CERTIFIED REGISTERED

## 2021-10-11 PROCEDURE — 73560 X-RAY EXAM OF KNEE 1 OR 2: CPT

## 2021-10-11 PROCEDURE — 2700000000 HC OXYGEN THERAPY PER DAY

## 2021-10-11 PROCEDURE — 2580000003 HC RX 258: Performed by: ORTHOPAEDIC SURGERY

## 2021-10-11 PROCEDURE — G0378 HOSPITAL OBSERVATION PER HR: HCPCS

## 2021-10-11 PROCEDURE — 97162 PT EVAL MOD COMPLEX 30 MIN: CPT

## 2021-10-11 PROCEDURE — 94760 N-INVAS EAR/PLS OXIMETRY 1: CPT

## 2021-10-11 PROCEDURE — 3700000001 HC ADD 15 MINUTES (ANESTHESIA): Performed by: ORTHOPAEDIC SURGERY

## 2021-10-11 PROCEDURE — 6360000002 HC RX W HCPCS: Performed by: NURSE ANESTHETIST, CERTIFIED REGISTERED

## 2021-10-11 PROCEDURE — C1776 JOINT DEVICE (IMPLANTABLE): HCPCS | Performed by: ORTHOPAEDIC SURGERY

## 2021-10-11 PROCEDURE — 88305 TISSUE EXAM BY PATHOLOGIST: CPT

## 2021-10-11 PROCEDURE — 96375 TX/PRO/DX INJ NEW DRUG ADDON: CPT

## 2021-10-11 PROCEDURE — 7100000000 HC PACU RECOVERY - FIRST 15 MIN: Performed by: ORTHOPAEDIC SURGERY

## 2021-10-11 PROCEDURE — 97530 THERAPEUTIC ACTIVITIES: CPT

## 2021-10-11 PROCEDURE — 97535 SELF CARE MNGMENT TRAINING: CPT

## 2021-10-11 PROCEDURE — 36415 COLL VENOUS BLD VENIPUNCTURE: CPT

## 2021-10-11 PROCEDURE — 94150 VITAL CAPACITY TEST: CPT

## 2021-10-11 PROCEDURE — 6360000002 HC RX W HCPCS: Performed by: ANESTHESIOLOGY

## 2021-10-11 PROCEDURE — 97165 OT EVAL LOW COMPLEX 30 MIN: CPT

## 2021-10-11 PROCEDURE — 97116 GAIT TRAINING THERAPY: CPT

## 2021-10-11 PROCEDURE — 86900 BLOOD TYPING SEROLOGIC ABO: CPT

## 2021-10-11 PROCEDURE — 94640 AIRWAY INHALATION TREATMENT: CPT

## 2021-10-11 PROCEDURE — C1713 ANCHOR/SCREW BN/BN,TIS/BN: HCPCS | Performed by: ORTHOPAEDIC SURGERY

## 2021-10-11 PROCEDURE — 2500000003 HC RX 250 WO HCPCS: Performed by: NURSE ANESTHETIST, CERTIFIED REGISTERED

## 2021-10-11 PROCEDURE — 87635 SARS-COV-2 COVID-19 AMP PRB: CPT

## 2021-10-11 PROCEDURE — 2720000010 HC SURG SUPPLY STERILE: Performed by: ORTHOPAEDIC SURGERY

## 2021-10-11 PROCEDURE — 2709999900 HC NON-CHARGEABLE SUPPLY: Performed by: ORTHOPAEDIC SURGERY

## 2021-10-11 PROCEDURE — 7100000001 HC PACU RECOVERY - ADDTL 15 MIN: Performed by: ORTHOPAEDIC SURGERY

## 2021-10-11 PROCEDURE — 86850 RBC ANTIBODY SCREEN: CPT

## 2021-10-11 PROCEDURE — 88311 DECALCIFY TISSUE: CPT

## 2021-10-11 PROCEDURE — 3600000005 HC SURGERY LEVEL 5 BASE: Performed by: ORTHOPAEDIC SURGERY

## 2021-10-11 PROCEDURE — 96365 THER/PROPH/DIAG IV INF INIT: CPT

## 2021-10-11 PROCEDURE — 6370000000 HC RX 637 (ALT 250 FOR IP): Performed by: NURSE PRACTITIONER

## 2021-10-11 PROCEDURE — 3600000015 HC SURGERY LEVEL 5 ADDTL 15MIN: Performed by: ORTHOPAEDIC SURGERY

## 2021-10-11 DEVICE — EXTENSION STEM SZ E 5DEG L TIBL KNEE CEM NAT TIB PERSONA: Type: IMPLANTABLE DEVICE | Site: KNEE | Status: FUNCTIONAL

## 2021-10-11 DEVICE — COMPONENT PAT DIA29MM THK8MM KNEE POLY CEM CONVENTIONAL: Type: IMPLANTABLE DEVICE | Site: KNEE | Status: FUNCTIONAL

## 2021-10-11 DEVICE — COMPONENT FEM SZ 8 STD L KNEE CO CHROM CEM POST STBL COR: Type: IMPLANTABLE DEVICE | Site: KNEE | Status: FUNCTIONAL

## 2021-10-11 DEVICE — CEMENT BNE 40GM HI VISC RADPQ FOR REV SURG: Type: IMPLANTABLE DEVICE | Site: KNEE | Status: FUNCTIONAL

## 2021-10-11 DEVICE — IMPLANTABLE DEVICE: Type: IMPLANTABLE DEVICE | Site: KNEE | Status: FUNCTIONAL

## 2021-10-11 RX ORDER — MEPERIDINE HYDROCHLORIDE 25 MG/ML
12.5 INJECTION INTRAMUSCULAR; INTRAVENOUS; SUBCUTANEOUS EVERY 5 MIN PRN
Status: DISCONTINUED | OUTPATIENT
Start: 2021-10-11 | End: 2021-10-11 | Stop reason: HOSPADM

## 2021-10-11 RX ORDER — OXYCODONE HYDROCHLORIDE 10 MG/1
10 TABLET ORAL PRN
Status: DISCONTINUED | OUTPATIENT
Start: 2021-10-11 | End: 2021-10-11 | Stop reason: HOSPADM

## 2021-10-11 RX ORDER — ACETAMINOPHEN 325 MG/1
650 TABLET ORAL EVERY 6 HOURS
Status: DISCONTINUED | OUTPATIENT
Start: 2021-10-11 | End: 2021-10-12 | Stop reason: HOSPADM

## 2021-10-11 RX ORDER — FLUTICASONE PROPIONATE 50 MCG
2 SPRAY, SUSPENSION (ML) NASAL DAILY
Status: DISCONTINUED | OUTPATIENT
Start: 2021-10-11 | End: 2021-10-12 | Stop reason: HOSPADM

## 2021-10-11 RX ORDER — IBUPROFEN 800 MG/1
800 TABLET ORAL 2 TIMES DAILY WITH MEALS
Qty: 60 TABLET | Refills: 1
Start: 2021-10-11

## 2021-10-11 RX ORDER — DEXAMETHASONE SODIUM PHOSPHATE 4 MG/ML
INJECTION, SOLUTION INTRA-ARTICULAR; INTRALESIONAL; INTRAMUSCULAR; INTRAVENOUS; SOFT TISSUE PRN
Status: DISCONTINUED | OUTPATIENT
Start: 2021-10-11 | End: 2021-10-11 | Stop reason: SDUPTHER

## 2021-10-11 RX ORDER — ASPIRIN 81 MG/1
81 TABLET ORAL 2 TIMES DAILY
Qty: 28 TABLET | Refills: 0 | Status: SHIPPED | OUTPATIENT
Start: 2021-10-12 | End: 2021-10-11 | Stop reason: HOSPADM

## 2021-10-11 RX ORDER — SODIUM CHLORIDE 9 MG/ML
INJECTION, SOLUTION INTRAVENOUS CONTINUOUS PRN
Status: DISCONTINUED | OUTPATIENT
Start: 2021-10-11 | End: 2021-10-11 | Stop reason: SDUPTHER

## 2021-10-11 RX ORDER — ASPIRIN 81 MG/1
81 TABLET ORAL 2 TIMES DAILY
Status: DISCONTINUED | OUTPATIENT
Start: 2021-10-12 | End: 2021-10-11

## 2021-10-11 RX ORDER — SENNA AND DOCUSATE SODIUM 50; 8.6 MG/1; MG/1
1 TABLET, FILM COATED ORAL 2 TIMES DAILY
Status: DISCONTINUED | OUTPATIENT
Start: 2021-10-11 | End: 2021-10-12 | Stop reason: HOSPADM

## 2021-10-11 RX ORDER — CETIRIZINE HYDROCHLORIDE 10 MG/1
5 TABLET ORAL DAILY
Status: DISCONTINUED | OUTPATIENT
Start: 2021-10-11 | End: 2021-10-12 | Stop reason: HOSPADM

## 2021-10-11 RX ORDER — MELOXICAM 7.5 MG/1
15 TABLET ORAL ONCE
Status: DISCONTINUED | OUTPATIENT
Start: 2021-10-11 | End: 2021-10-11 | Stop reason: SDUPTHER

## 2021-10-11 RX ORDER — SODIUM CHLORIDE 0.9 % (FLUSH) 0.9 %
5-40 SYRINGE (ML) INJECTION PRN
Status: DISCONTINUED | OUTPATIENT
Start: 2021-10-11 | End: 2021-10-11 | Stop reason: HOSPADM

## 2021-10-11 RX ORDER — NICOTINE POLACRILEX 4 MG
15 LOZENGE BUCCAL PRN
Status: DISCONTINUED | OUTPATIENT
Start: 2021-10-11 | End: 2021-10-12 | Stop reason: HOSPADM

## 2021-10-11 RX ORDER — MAGNESIUM HYDROXIDE 1200 MG/15ML
LIQUID ORAL CONTINUOUS PRN
Status: COMPLETED | OUTPATIENT
Start: 2021-10-11 | End: 2021-10-11

## 2021-10-11 RX ORDER — FENTANYL CITRATE 50 UG/ML
INJECTION, SOLUTION INTRAMUSCULAR; INTRAVENOUS PRN
Status: DISCONTINUED | OUTPATIENT
Start: 2021-10-11 | End: 2021-10-11 | Stop reason: SDUPTHER

## 2021-10-11 RX ORDER — FENTANYL CITRATE 50 UG/ML
50 INJECTION, SOLUTION INTRAMUSCULAR; INTRAVENOUS EVERY 5 MIN PRN
Status: DISCONTINUED | OUTPATIENT
Start: 2021-10-11 | End: 2021-10-11 | Stop reason: HOSPADM

## 2021-10-11 RX ORDER — MELOXICAM 7.5 MG/1
15 TABLET ORAL ONCE
Status: COMPLETED | OUTPATIENT
Start: 2021-10-11 | End: 2021-10-11

## 2021-10-11 RX ORDER — PROMETHAZINE HYDROCHLORIDE 25 MG/1
12.5 TABLET ORAL EVERY 6 HOURS PRN
Status: DISCONTINUED | OUTPATIENT
Start: 2021-10-11 | End: 2021-10-12 | Stop reason: HOSPADM

## 2021-10-11 RX ORDER — SODIUM CHLORIDE 0.9 % (FLUSH) 0.9 %
5-40 SYRINGE (ML) INJECTION EVERY 12 HOURS SCHEDULED
Status: DISCONTINUED | OUTPATIENT
Start: 2021-10-11 | End: 2021-10-12 | Stop reason: HOSPADM

## 2021-10-11 RX ORDER — HYDROCHLOROTHIAZIDE 25 MG/1
25 TABLET ORAL DAILY
Status: DISCONTINUED | OUTPATIENT
Start: 2021-10-12 | End: 2021-10-12 | Stop reason: HOSPADM

## 2021-10-11 RX ORDER — SODIUM CHLORIDE 0.9 % (FLUSH) 0.9 %
5-40 SYRINGE (ML) INJECTION EVERY 12 HOURS SCHEDULED
Status: DISCONTINUED | OUTPATIENT
Start: 2021-10-11 | End: 2021-10-11 | Stop reason: HOSPADM

## 2021-10-11 RX ORDER — OXYCODONE HYDROCHLORIDE 5 MG/1
5 TABLET ORAL PRN
Status: DISCONTINUED | OUTPATIENT
Start: 2021-10-11 | End: 2021-10-11 | Stop reason: HOSPADM

## 2021-10-11 RX ORDER — METOPROLOL TARTRATE 5 MG/5ML
INJECTION INTRAVENOUS PRN
Status: DISCONTINUED | OUTPATIENT
Start: 2021-10-11 | End: 2021-10-11 | Stop reason: SDUPTHER

## 2021-10-11 RX ORDER — HYDRALAZINE HYDROCHLORIDE 20 MG/ML
INJECTION INTRAMUSCULAR; INTRAVENOUS PRN
Status: DISCONTINUED | OUTPATIENT
Start: 2021-10-11 | End: 2021-10-11 | Stop reason: SDUPTHER

## 2021-10-11 RX ORDER — ALBUTEROL SULFATE 90 UG/1
2 AEROSOL, METERED RESPIRATORY (INHALATION) EVERY 4 HOURS PRN
Status: DISCONTINUED | OUTPATIENT
Start: 2021-10-11 | End: 2021-10-12 | Stop reason: HOSPADM

## 2021-10-11 RX ORDER — ONDANSETRON 2 MG/ML
4 INJECTION INTRAMUSCULAR; INTRAVENOUS EVERY 6 HOURS PRN
Status: DISCONTINUED | OUTPATIENT
Start: 2021-10-11 | End: 2021-10-12 | Stop reason: HOSPADM

## 2021-10-11 RX ORDER — MORPHINE SULFATE 2 MG/ML
2 INJECTION, SOLUTION INTRAMUSCULAR; INTRAVENOUS EVERY 5 MIN PRN
Status: DISCONTINUED | OUTPATIENT
Start: 2021-10-11 | End: 2021-10-11 | Stop reason: HOSPADM

## 2021-10-11 RX ORDER — ROCURONIUM BROMIDE 10 MG/ML
INJECTION, SOLUTION INTRAVENOUS PRN
Status: DISCONTINUED | OUTPATIENT
Start: 2021-10-11 | End: 2021-10-11 | Stop reason: SDUPTHER

## 2021-10-11 RX ORDER — SUCCINYLCHOLINE/SOD CL,ISO/PF 200MG/10ML
SYRINGE (ML) INTRAVENOUS PRN
Status: DISCONTINUED | OUTPATIENT
Start: 2021-10-11 | End: 2021-10-11 | Stop reason: SDUPTHER

## 2021-10-11 RX ORDER — DEXTROSE MONOHYDRATE 25 G/50ML
12.5 INJECTION, SOLUTION INTRAVENOUS PRN
Status: DISCONTINUED | OUTPATIENT
Start: 2021-10-11 | End: 2021-10-12 | Stop reason: HOSPADM

## 2021-10-11 RX ORDER — SODIUM CHLORIDE 9 MG/ML
INJECTION, SOLUTION INTRAVENOUS CONTINUOUS
Status: DISCONTINUED | OUTPATIENT
Start: 2021-10-11 | End: 2021-10-11

## 2021-10-11 RX ORDER — SODIUM CHLORIDE 9 MG/ML
25 INJECTION, SOLUTION INTRAVENOUS PRN
Status: DISCONTINUED | OUTPATIENT
Start: 2021-10-11 | End: 2021-10-11 | Stop reason: HOSPADM

## 2021-10-11 RX ORDER — OXYCODONE HYDROCHLORIDE 5 MG/1
5-10 TABLET ORAL EVERY 6 HOURS PRN
Qty: 40 TABLET | Refills: 0 | Status: SHIPPED | OUTPATIENT
Start: 2021-10-11 | End: 2021-10-19 | Stop reason: SDUPTHER

## 2021-10-11 RX ORDER — FENTANYL CITRATE 50 UG/ML
25 INJECTION, SOLUTION INTRAMUSCULAR; INTRAVENOUS EVERY 5 MIN PRN
Status: DISCONTINUED | OUTPATIENT
Start: 2021-10-11 | End: 2021-10-11 | Stop reason: HOSPADM

## 2021-10-11 RX ORDER — LISINOPRIL 10 MG/1
10 TABLET ORAL DAILY
Status: DISCONTINUED | OUTPATIENT
Start: 2021-10-12 | End: 2021-10-12 | Stop reason: HOSPADM

## 2021-10-11 RX ORDER — DEXTROSE MONOHYDRATE 50 MG/ML
100 INJECTION, SOLUTION INTRAVENOUS PRN
Status: DISCONTINUED | OUTPATIENT
Start: 2021-10-11 | End: 2021-10-12 | Stop reason: HOSPADM

## 2021-10-11 RX ORDER — ONDANSETRON 2 MG/ML
4 INJECTION INTRAMUSCULAR; INTRAVENOUS
Status: DISCONTINUED | OUTPATIENT
Start: 2021-10-11 | End: 2021-10-11 | Stop reason: HOSPADM

## 2021-10-11 RX ORDER — PANTOPRAZOLE SODIUM 40 MG/1
40 TABLET, DELAYED RELEASE ORAL
Status: DISCONTINUED | OUTPATIENT
Start: 2021-10-12 | End: 2021-10-12 | Stop reason: HOSPADM

## 2021-10-11 RX ORDER — ONDANSETRON 2 MG/ML
INJECTION INTRAMUSCULAR; INTRAVENOUS PRN
Status: DISCONTINUED | OUTPATIENT
Start: 2021-10-11 | End: 2021-10-11 | Stop reason: SDUPTHER

## 2021-10-11 RX ORDER — OXYCODONE HYDROCHLORIDE 5 MG/1
5 TABLET ORAL EVERY 4 HOURS PRN
Status: DISCONTINUED | OUTPATIENT
Start: 2021-10-11 | End: 2021-10-12 | Stop reason: HOSPADM

## 2021-10-11 RX ORDER — MORPHINE SULFATE 2 MG/ML
1 INJECTION, SOLUTION INTRAMUSCULAR; INTRAVENOUS EVERY 5 MIN PRN
Status: DISCONTINUED | OUTPATIENT
Start: 2021-10-11 | End: 2021-10-11 | Stop reason: HOSPADM

## 2021-10-11 RX ORDER — PROPOFOL 10 MG/ML
INJECTION, EMULSION INTRAVENOUS PRN
Status: DISCONTINUED | OUTPATIENT
Start: 2021-10-11 | End: 2021-10-11 | Stop reason: SDUPTHER

## 2021-10-11 RX ORDER — SODIUM CHLORIDE 9 MG/ML
25 INJECTION, SOLUTION INTRAVENOUS PRN
Status: DISCONTINUED | OUTPATIENT
Start: 2021-10-11 | End: 2021-10-12 | Stop reason: HOSPADM

## 2021-10-11 RX ORDER — INSULIN GLARGINE 100 [IU]/ML
0.25 INJECTION, SOLUTION SUBCUTANEOUS NIGHTLY
Status: DISCONTINUED | OUTPATIENT
Start: 2021-10-11 | End: 2021-10-12 | Stop reason: HOSPADM

## 2021-10-11 RX ORDER — LABETALOL HYDROCHLORIDE 5 MG/ML
INJECTION, SOLUTION INTRAVENOUS PRN
Status: DISCONTINUED | OUTPATIENT
Start: 2021-10-11 | End: 2021-10-11 | Stop reason: SDUPTHER

## 2021-10-11 RX ORDER — SODIUM CHLORIDE 0.9 % (FLUSH) 0.9 %
5-40 SYRINGE (ML) INJECTION PRN
Status: DISCONTINUED | OUTPATIENT
Start: 2021-10-11 | End: 2021-10-12 | Stop reason: HOSPADM

## 2021-10-11 RX ORDER — SODIUM CHLORIDE 9 MG/ML
INJECTION, SOLUTION INTRAVENOUS CONTINUOUS
Status: DISCONTINUED | OUTPATIENT
Start: 2021-10-11 | End: 2021-10-12 | Stop reason: HOSPADM

## 2021-10-11 RX ORDER — MIDAZOLAM HYDROCHLORIDE 1 MG/ML
INJECTION INTRAMUSCULAR; INTRAVENOUS PRN
Status: DISCONTINUED | OUTPATIENT
Start: 2021-10-11 | End: 2021-10-11 | Stop reason: SDUPTHER

## 2021-10-11 RX ORDER — OXYCODONE HYDROCHLORIDE 10 MG/1
10 TABLET ORAL EVERY 4 HOURS PRN
Status: DISCONTINUED | OUTPATIENT
Start: 2021-10-11 | End: 2021-10-12 | Stop reason: HOSPADM

## 2021-10-11 RX ORDER — LIDOCAINE HYDROCHLORIDE 20 MG/ML
INJECTION, SOLUTION EPIDURAL; INFILTRATION; INTRACAUDAL; PERINEURAL PRN
Status: DISCONTINUED | OUTPATIENT
Start: 2021-10-11 | End: 2021-10-11 | Stop reason: SDUPTHER

## 2021-10-11 RX ADMIN — FENTANYL CITRATE 50 MCG: 50 INJECTION INTRAMUSCULAR; INTRAVENOUS at 10:10

## 2021-10-11 RX ADMIN — PROPOFOL 200 MG: 10 INJECTION, EMULSION INTRAVENOUS at 10:10

## 2021-10-11 RX ADMIN — ONDANSETRON 4 MG: 2 INJECTION INTRAMUSCULAR; INTRAVENOUS at 11:49

## 2021-10-11 RX ADMIN — FENTANYL CITRATE 50 MCG: 50 INJECTION INTRAMUSCULAR; INTRAVENOUS at 13:34

## 2021-10-11 RX ADMIN — Medication 3000 MG: at 18:36

## 2021-10-11 RX ADMIN — HYDROMORPHONE HYDROCHLORIDE 0.5 MG: 1 INJECTION, SOLUTION INTRAMUSCULAR; INTRAVENOUS; SUBCUTANEOUS at 22:53

## 2021-10-11 RX ADMIN — MIDAZOLAM 1 MG: 1 INJECTION INTRAMUSCULAR; INTRAVENOUS at 10:00

## 2021-10-11 RX ADMIN — MELOXICAM 15 MG: 7.5 TABLET ORAL at 08:18

## 2021-10-11 RX ADMIN — METOPROLOL TARTRATE 2.5 MG: 5 INJECTION, SOLUTION INTRAVENOUS at 11:11

## 2021-10-11 RX ADMIN — ROCURONIUM BROMIDE 40 MG: 10 INJECTION INTRAVENOUS at 10:18

## 2021-10-11 RX ADMIN — MIDAZOLAM 1 MG: 1 INJECTION INTRAMUSCULAR; INTRAVENOUS at 10:10

## 2021-10-11 RX ADMIN — FENTANYL CITRATE 50 MCG: 50 INJECTION INTRAMUSCULAR; INTRAVENOUS at 10:33

## 2021-10-11 RX ADMIN — FLUTICASONE PROPIONATE 2 SPRAY: 50 SPRAY, METERED NASAL at 17:08

## 2021-10-11 RX ADMIN — DOCUSATE SODIUM 50MG AND SENNOSIDES 8.6MG 1 TABLET: 8.6; 5 TABLET, FILM COATED ORAL at 20:25

## 2021-10-11 RX ADMIN — Medication 120 MG: at 10:11

## 2021-10-11 RX ADMIN — APIXABAN 2.5 MG: 2.5 TABLET, FILM COATED ORAL at 20:25

## 2021-10-11 RX ADMIN — LABETALOL HYDROCHLORIDE 5 MG: 5 INJECTION, SOLUTION INTRAVENOUS at 10:29

## 2021-10-11 RX ADMIN — PROPOFOL 50 MG: 10 INJECTION, EMULSION INTRAVENOUS at 11:00

## 2021-10-11 RX ADMIN — Medication 2 PUFF: at 16:50

## 2021-10-11 RX ADMIN — ACETAMINOPHEN 650 MG: 325 TABLET ORAL at 15:10

## 2021-10-11 RX ADMIN — SODIUM CHLORIDE: 9 INJECTION, SOLUTION INTRAVENOUS at 15:12

## 2021-10-11 RX ADMIN — INSULIN GLARGINE 32 UNITS: 100 INJECTION, SOLUTION SUBCUTANEOUS at 20:30

## 2021-10-11 RX ADMIN — INSULIN LISPRO 10 UNITS: 100 INJECTION, SOLUTION INTRAVENOUS; SUBCUTANEOUS at 18:22

## 2021-10-11 RX ADMIN — DEXAMETHASONE SODIUM PHOSPHATE 8 MG: 4 INJECTION, SOLUTION INTRAMUSCULAR; INTRAVENOUS at 10:35

## 2021-10-11 RX ADMIN — INSULIN LISPRO 1 UNITS: 100 INJECTION, SOLUTION INTRAVENOUS; SUBCUTANEOUS at 20:30

## 2021-10-11 RX ADMIN — SODIUM CHLORIDE: 9 INJECTION, SOLUTION INTRAVENOUS at 10:05

## 2021-10-11 RX ADMIN — INSULIN LISPRO 1 UNITS: 100 INJECTION, SOLUTION INTRAVENOUS; SUBCUTANEOUS at 18:21

## 2021-10-11 RX ADMIN — SODIUM CHLORIDE: 9 INJECTION, SOLUTION INTRAVENOUS at 11:20

## 2021-10-11 RX ADMIN — LABETALOL HYDROCHLORIDE 10 MG: 5 INJECTION, SOLUTION INTRAVENOUS at 10:38

## 2021-10-11 RX ADMIN — OXYCODONE HYDROCHLORIDE 10 MG: 10 TABLET ORAL at 20:24

## 2021-10-11 RX ADMIN — LIDOCAINE HYDROCHLORIDE 100 MG: 20 INJECTION, SOLUTION EPIDURAL; INFILTRATION; INTRACAUDAL; PERINEURAL at 10:10

## 2021-10-11 RX ADMIN — ACETAMINOPHEN 650 MG: 325 TABLET ORAL at 20:24

## 2021-10-11 RX ADMIN — HYDROMORPHONE HYDROCHLORIDE 0.25 MG: 1 INJECTION, SOLUTION INTRAMUSCULAR; INTRAVENOUS; SUBCUTANEOUS at 18:23

## 2021-10-11 RX ADMIN — LABETALOL HYDROCHLORIDE 5 MG: 5 INJECTION, SOLUTION INTRAVENOUS at 10:58

## 2021-10-11 RX ADMIN — HYDRALAZINE HYDROCHLORIDE 10 MG: 20 INJECTION INTRAMUSCULAR; INTRAVENOUS at 11:36

## 2021-10-11 RX ADMIN — FENTANYL CITRATE 50 MCG: 50 INJECTION INTRAMUSCULAR; INTRAVENOUS at 10:00

## 2021-10-11 RX ADMIN — METOPROLOL TARTRATE 2.5 MG: 5 INJECTION, SOLUTION INTRAVENOUS at 11:07

## 2021-10-11 RX ADMIN — Medication 2 PUFF: at 20:45

## 2021-10-11 RX ADMIN — DOCUSATE SODIUM 50MG AND SENNOSIDES 8.6MG 1 TABLET: 8.6; 5 TABLET, FILM COATED ORAL at 17:10

## 2021-10-11 RX ADMIN — Medication 3000 MG: at 10:00

## 2021-10-11 RX ADMIN — SUGAMMADEX 200 MG: 100 INJECTION, SOLUTION INTRAVENOUS at 12:26

## 2021-10-11 RX ADMIN — FENTANYL CITRATE 50 MCG: 50 INJECTION INTRAMUSCULAR; INTRAVENOUS at 10:40

## 2021-10-11 RX ADMIN — FENTANYL CITRATE 50 MCG: 50 INJECTION INTRAMUSCULAR; INTRAVENOUS at 13:15

## 2021-10-11 RX ADMIN — OXYCODONE HYDROCHLORIDE 10 MG: 10 TABLET ORAL at 15:10

## 2021-10-11 ASSESSMENT — PULMONARY FUNCTION TESTS
PIF_VALUE: 31
PIF_VALUE: 26
PIF_VALUE: 30
PIF_VALUE: 28
PIF_VALUE: 26
PIF_VALUE: 29
PIF_VALUE: 28
PIF_VALUE: 31
PIF_VALUE: 32
PIF_VALUE: 28
PIF_VALUE: 32
PIF_VALUE: 1
PIF_VALUE: 28
PIF_VALUE: 29
PIF_VALUE: 26
PIF_VALUE: 30
PIF_VALUE: 27
PIF_VALUE: 23
PIF_VALUE: 33
PIF_VALUE: 27
PIF_VALUE: 26
PIF_VALUE: 30
PIF_VALUE: 30
PIF_VALUE: 28
PIF_VALUE: 41
PIF_VALUE: 36
PIF_VALUE: 26
PIF_VALUE: 41
PIF_VALUE: 30
PIF_VALUE: 26
PIF_VALUE: 26
PIF_VALUE: 20
PIF_VALUE: 29
PIF_VALUE: 41
PIF_VALUE: 29
PIF_VALUE: 30
PIF_VALUE: 33
PIF_VALUE: 30
PIF_VALUE: 26
PIF_VALUE: 28
PIF_VALUE: 31
PIF_VALUE: 32
PIF_VALUE: 28
PIF_VALUE: 26
PIF_VALUE: 25
PIF_VALUE: 26
PIF_VALUE: 29
PIF_VALUE: 32
PIF_VALUE: 1
PIF_VALUE: 27
PIF_VALUE: 26
PIF_VALUE: 25
PIF_VALUE: 26
PIF_VALUE: 20
PIF_VALUE: 26
PIF_VALUE: 26
PIF_VALUE: 1
PIF_VALUE: 29
PIF_VALUE: 30
PIF_VALUE: 28
PIF_VALUE: 26
PIF_VALUE: 26
PIF_VALUE: 28
PIF_VALUE: 26
PIF_VALUE: 28
PIF_VALUE: 29
PIF_VALUE: 31
PIF_VALUE: 30
PIF_VALUE: 5
PIF_VALUE: 26
PIF_VALUE: 31
PIF_VALUE: 29
PIF_VALUE: 30
PIF_VALUE: 27
PIF_VALUE: 27
PIF_VALUE: 30
PIF_VALUE: 27
PIF_VALUE: 31
PIF_VALUE: 28
PIF_VALUE: 1
PIF_VALUE: 30
PIF_VALUE: 29
PIF_VALUE: 25
PIF_VALUE: 28
PIF_VALUE: 26
PIF_VALUE: 29
PIF_VALUE: 1
PIF_VALUE: 29
PIF_VALUE: 27
PIF_VALUE: 3
PIF_VALUE: 26
PIF_VALUE: 28
PIF_VALUE: 27
PIF_VALUE: 29
PIF_VALUE: 30
PIF_VALUE: 30
PIF_VALUE: 29
PIF_VALUE: 39
PIF_VALUE: 29
PIF_VALUE: 26
PIF_VALUE: 30
PIF_VALUE: 28
PIF_VALUE: 36
PIF_VALUE: 27
PIF_VALUE: 1
PIF_VALUE: 26
PIF_VALUE: 28
PIF_VALUE: 32
PIF_VALUE: 29
PIF_VALUE: 28
PIF_VALUE: 26
PIF_VALUE: 30
PIF_VALUE: 30
PIF_VALUE: 16
PIF_VALUE: 30
PIF_VALUE: 28
PIF_VALUE: 26
PIF_VALUE: 1
PIF_VALUE: 30
PIF_VALUE: 31
PIF_VALUE: 31
PIF_VALUE: 28
PIF_VALUE: 30
PIF_VALUE: 28
PIF_VALUE: 1
PIF_VALUE: 30
PIF_VALUE: 32
PIF_VALUE: 30
PIF_VALUE: 26
PIF_VALUE: 33
PIF_VALUE: 28
PIF_VALUE: 26
PIF_VALUE: 27
PIF_VALUE: 28
PIF_VALUE: 31
PIF_VALUE: 21
PIF_VALUE: 28
PIF_VALUE: 28
PIF_VALUE: 26
PIF_VALUE: 28
PIF_VALUE: 29
PIF_VALUE: 39
PIF_VALUE: 26
PIF_VALUE: 27
PIF_VALUE: 28
PIF_VALUE: 20
PIF_VALUE: 29
PIF_VALUE: 28

## 2021-10-11 ASSESSMENT — PAIN DESCRIPTION - DESCRIPTORS
DESCRIPTORS: ACHING
DESCRIPTORS: ACHING
DESCRIPTORS: ACHING;SHARP;SHOOTING
DESCRIPTORS: ACHING

## 2021-10-11 ASSESSMENT — PAIN DESCRIPTION - ONSET
ONSET: ON-GOING

## 2021-10-11 ASSESSMENT — PAIN DESCRIPTION - PROGRESSION
CLINICAL_PROGRESSION: NOT CHANGED
CLINICAL_PROGRESSION: GRADUALLY IMPROVING
CLINICAL_PROGRESSION: GRADUALLY IMPROVING

## 2021-10-11 ASSESSMENT — PAIN DESCRIPTION - FREQUENCY
FREQUENCY: CONTINUOUS

## 2021-10-11 ASSESSMENT — PAIN DESCRIPTION - LOCATION
LOCATION: KNEE

## 2021-10-11 ASSESSMENT — PAIN SCALES - GENERAL
PAINLEVEL_OUTOF10: 9
PAINLEVEL_OUTOF10: 6
PAINLEVEL_OUTOF10: 8
PAINLEVEL_OUTOF10: 6
PAINLEVEL_OUTOF10: 0
PAINLEVEL_OUTOF10: 0
PAINLEVEL_OUTOF10: 5
PAINLEVEL_OUTOF10: 7
PAINLEVEL_OUTOF10: 7
PAINLEVEL_OUTOF10: 8
PAINLEVEL_OUTOF10: 9
PAINLEVEL_OUTOF10: 6

## 2021-10-11 ASSESSMENT — PAIN - FUNCTIONAL ASSESSMENT
PAIN_FUNCTIONAL_ASSESSMENT: PREVENTS OR INTERFERES SOME ACTIVE ACTIVITIES AND ADLS
PAIN_FUNCTIONAL_ASSESSMENT: 0-10
PAIN_FUNCTIONAL_ASSESSMENT: PREVENTS OR INTERFERES SOME ACTIVE ACTIVITIES AND ADLS

## 2021-10-11 ASSESSMENT — PAIN DESCRIPTION - PAIN TYPE
TYPE: ACUTE PAIN;SURGICAL PAIN
TYPE: SURGICAL PAIN;ACUTE PAIN
TYPE: ACUTE PAIN;SURGICAL PAIN
TYPE: ACUTE PAIN;SURGICAL PAIN
TYPE: SURGICAL PAIN;ACUTE PAIN

## 2021-10-11 ASSESSMENT — PAIN DESCRIPTION - ORIENTATION
ORIENTATION: LEFT

## 2021-10-11 ASSESSMENT — LIFESTYLE VARIABLES: SMOKING_STATUS: 0

## 2021-10-11 ASSESSMENT — ENCOUNTER SYMPTOMS: SHORTNESS OF BREATH: 0

## 2021-10-11 NOTE — PROGRESS NOTES
Occupational Therapy   Occupational Therapy Initial Assessment  Date: 10/11/2021   Patient Name: Addy Castañeda  MRN: [id number]     : 1965    Date of Service: 10/11/2021    Discharge Recommendations:  2-3 sessions per week, Home with Home health OT, Patient would benefit from continued therapy after discharge, 24 hour supervision or assist  OT Equipment Recommendations  Equipment Needed: Yes  Mobility Devices: Jaz Dumonts: Rolling    Addy Castañeda scored a 16/24 on the AM-PAC ADL Inpatient form. Current research shows that an AM-PAC score of 18 or greater is typically associated with a discharge to the patient's home setting. Based on the patient's AM-PAC score, and their current ADL deficits, it is recommended that the patient have 2-3 sessions per week of Occupational Therapy at d/c to increase the patient's independence. At this time, this patient demonstrates the endurance and safety to discharge home with [address] [place] [address] (home vs OP services) and a follow up treatment frequency of 2-3x/wk. Please see assessment section for further patient specific details. If patient discharges prior to next session this note will serve as a discharge summary. Please see below for the latest assessment towards goals. Assessment   Performance deficits / Impairments: Decreased functional mobility ; Decreased endurance;Decreased ADL status; Decreased safe awareness;Decreased high-level IADLs;Decreased ROM; Decreased balance  Assessment: 65 yo male admitted 10/11 for elective L TKA. PMH: HTN, R TKA . PTA, pt lives alone and reports independence with ADL, IADL, fxl mobility no AD. Today, pt functioning below baseline d/t above deficits most limited by L knee pain and drowsiness. Pt required Min/mod A bed mobility and CGA for EOB and BSC tx and short stand pivot EOB><BSC. Anticipate Mod A LB and set up seated UB ADL based on balance, endurance, cognition, pain and PLOF.  Cont acute OT and rec OT 2-3x/week prior to safe d/c home  Treatment Diagnosis: impaired ADL/fxl mobility  Prognosis: Good  Decision Making: Low Complexity  OT Education: OT Role;Transfer Training;Plan of Care;Precautions  REQUIRES OT FOLLOW UP: Yes  Activity Tolerance  Activity Tolerance: Patient Tolerated treatment well;Patient limited by pain  Activity Tolerance: drowsy  Safety Devices  Safety Devices in place: Yes  Type of devices: Nurse notified;Gait belt;Call light within reach; Bed alarm in place; Patient at risk for falls; Left in bed         Patient Diagnosis(es): The primary encounter diagnosis was Primary osteoarthritis of left knee. A diagnosis of Osteoarthritis of left knee, unspecified osteoarthritis type was also pertinent to this visit. has a past medical history of Allergic rhinitis, Arthritis, GERD (gastroesophageal reflux disease), Hypertension, Pre-diabetes, and Wears glasses. has a past surgical history that includes Dental surgery; Total knee arthroplasty (Right, 1/8/2019); and Knee Arthroplasty (Left, 10/11/2021). Treatment Diagnosis: impaired ADL/fxl mobility      Restrictions  Restrictions/Precautions  Restrictions/Precautions: Weight Bearing, Fall Risk  Position Activity Restriction  Other position/activity restrictions: WBAT    Subjective   General  Chart Reviewed: Yes  Patient assessed for rehabilitation services?: Yes  Additional Pertinent Hx: 65 yo male admitted 10/11 for elective L TKA. PMH: HTN, R TKA 2019  Referring Practitioner: Vita Shah MD  Diagnosis: L TKA  Subjective  Subjective: Pt resting in bed upon arrival and agreeable to OT/PT eval. Pt reporting posterior knee pain.  Pt drowsy throughout  Patient Currently in Pain:  (7/10)  Pain Assessment  Pain Assessment: 0-10  Pain Level: 8  Pain Type: Acute pain;Surgical pain  Pain Location: Knee  Pain Orientation: Left  Pain Descriptors: Aching  Pain Frequency: Continuous  Clinical Progression: Not changed  Functional Pain Assessment: Prevents or interferes some active activities and ADLs  Non-Pharmaceutical Pain Intervention(s): Rest;Repositioned  Vital Signs  Temp: 98.4 °F (36.9 °C)  Temp Source: Oral  Pulse: 91  Heart Rate Source: Monitor  Resp: 16  BP: 122/85  BP Location: Right Arm  MAP (mmHg): 97  Level of Consciousness: Alert (0)  MEWS Score: 1  Patient Currently in Pain:  (7/10)  Oxygen Therapy  SpO2: 95 %  Pulse Oximeter Device Mode: Intermittent  Pulse Oximeter Device Location: Finger  O2 Device: Nasal cannula  O2 Flow Rate (L/min): 2 L/min  Social/Functional History  Social/Functional History  Lives With: Daughter  Type of Home: House  Home Layout: Two level  Home Access: Stairs to enter with rails  Entrance Stairs - Number of Steps: 8  Bathroom Shower/Tub: Tub/Shower unit  Bathroom Toilet: Standard  ADL Assistance: Independent  Homemaking Assistance: Independent  Homemaking Responsibilities:  (shares homemaking tasks)  Ambulation Assistance: Independent  Transfer Assistance: Independent  Active : No  Additional Comments: plans discharge to her Mother's home- brother will help and will have shower chair and grab bars there       Objective   Vision: Within Functional Limits  Hearing: Within functional limits    Orientation  Overall Orientation Status: Within Normal Limits     Balance  Sitting Balance: Stand by assistance  Standing Balance: Contact guard assistance (PRN tx and pant management)  Functional Mobility  Functional - Mobility Device: Rolling Walker  Activity:  (EOB><BSC)  Assist Level: Contact guard assistance  Functional Mobility Comments: slower short steps/pivots d/t drowsiness and dizziness  Toilet Transfers  Toilet - Technique: Ambulating (RW. stand pivot)  Equipment Used: Standard bedside commode  Toilet Transfer: Contact guard assistance  Toilet Transfers Comments: cues hand placement  ADL  LE Dressing: Maximum assistance (donning pants)  Toileting:  Moderate assistance (pant management)  Additional Comments: Anticipate Mod A LB and set up seated UB ADL based on balance, endurance, cognition, pain and PLOF  Tone RUE  RUE Tone: Normotonic  Tone LUE  LUE Tone: Normotonic  Coordination  Movements Are Fluid And Coordinated: Yes     Bed mobility  Supine to Sit: Minimal assistance  Sit to Supine: Minimal assistance; Moderate assistance  Transfers  Sit to stand: Contact guard assistance  Stand to sit: Contact guard assistance  Transfer Comments: RW. cues hand placement     Cognition  Overall Cognitive Status: WFL  Cognition Comment: drowsy        Sensation  Overall Sensation Status: WFL        LUE AROM (degrees)  LUE AROM : WFL  RUE AROM (degrees)  RUE AROM : WFL  LUE Strength  Gross LUE Strength: WFL  RUE Strength  Gross RUE Strength: WFL                Plan   Plan  Times per week: 1-2  Current Treatment Recommendations: Strengthening, Functional Mobility Training, Patient/Caregiver Education & Training, Positioning, ROM, Endurance Training, Pain Management, Balance Training, Safety Education & Training, Self-Care / ADL    AM-PAC Score        AM-[facility] Inpatient Daily Activity Raw Score: 16 (10/11/21 1616)  AM-PAC Inpatient ADL T-Scale Score : 35.96 (10/11/21 1616)  ADL Inpatient CMS 0-100% Score: 53.32 (10/11/21 1616)  ADL Inpatient CMS G-Code Modifier : CK (10/11/21 1616)    Goals  Short term goals  Time Frame for Short term goals: prior to d/c  Short term goal 1: toileting SUP  Short term goal 2: ADL tx SUP  Short term goal 3: LB dressing SBA  Short term goal 4: Tolerate 5 min fxl standing task SUP  Short term goal 5: UB dressing set up  Patient Goals   Patient goals : decrease pain       Therapy Time   Individual Concurrent Group Co-treatment   Time In 1530         Time Out 1610         Minutes 40         Timed Code Treatment Minutes: 25 Minutes (15 eval. 15 ADL.  10 TA)       Stephanie Alcala, OTKALANI, OTR/L

## 2021-10-11 NOTE — PROGRESS NOTES
[facility] Orthopedic Surgery   Progress Note      S/P :  SUBJECTIVE  In bed. Drowsy but oriented. . Pain is   described in left knee and with the intensity of moderate. Pain is described as aching. OBJECTIVE              Physical                      VITALS:  /74   Pulse 87   Temp 96.1 °F (35.6 °C) (Temporal)   Resp 16   Ht 5' 7\" (1.702 m)   Wt 282 lb 1.3 oz (128 kg)   SpO2 96%   BMI 44.18 kg/m²                     MUSCULOSKELETAL:  left foot NVI. Wiggles toes to command. Pedal pulses are palpable and strong. .Left foot cool to touch                   NEUROLOGIC:                                  Sensory:  Touch:  Left Lower Extremity:  normal                                                 Surgical wound appears clean and dry left knee with ACE and ice pack.      Data       CBC:   Lab Results   Component Value Date    WBC 4.3 09/20/2021    RBC 3.99 09/20/2021    HGB 11.5 09/20/2021    HCT 35.0 09/20/2021    MCV 87.6 09/20/2021    MCH 28.7 09/20/2021    MCHC 32.7 09/20/2021    RDW 13.8 09/20/2021     09/20/2021    MPV 8.5 09/20/2021        WBC:    Lab Results   Component Value Date    WBC 4.3 09/20/2021        Hemoglobin/Hematocrit:    Lab Results   Component Value Date    HGB 11.5 09/20/2021    HCT 35.0 09/20/2021        PT/INR:    Lab Results   Component Value Date    PROTIME 12.0 09/20/2021    INR 1.06 09/20/2021              Current Inpatient Medications             Current Facility-Administered Medications: albuterol sulfate  (90 Base) MCG/ACT inhaler 2 puff, 2 puff, Inhalation, Q4H PRN  fluticasone (FLONASE) 50 MCG/ACT nasal spray 2 spray, 2 spray, Nasal, Daily  hydroCHLOROthiazide (HYDRODIURIL) tablet 25 mg, 25 mg, Oral, Daily  lisinopril (PRINIVIL;ZESTRIL) tablet 10 mg, 10 mg, Oral, Daily  cetirizine (ZYRTEC) tablet 5 mg, 5 mg, Oral, Daily  [START ON 10/12/2021] pantoprazole (PROTONIX) tablet 40 mg, 40 mg, Oral, QAM AC  insulin glargine (LANTUS) injection vial 32 Units, 0.25 Units/kg, SubCUTAneous, Nightly  insulin lispro (HUMALOG) injection vial 10 Units, 0.08 Units/kg, SubCUTAneous, TID WC  insulin lispro (HUMALOG) injection vial 0-6 Units, 0-6 Units, SubCUTAneous, TID WC  insulin lispro (HUMALOG) injection vial 0-3 Units, 0-3 Units, SubCUTAneous, Nightly  glucose (GLUTOSE) 40 % oral gel 15 g, 15 g, Oral, PRN  dextrose 50 % IV solution, 12.5 g, IntraVENous, PRN  glucagon (rDNA) injection 1 mg, 1 mg, IntraMUSCular, PRN  dextrose 5 % solution, 100 mL/hr, IntraVENous, PRN  0.9 % sodium chloride infusion, , IntraVENous, Continuous  sodium chloride flush 0.9 % injection 5-40 mL, 5-40 mL, IntraVENous, 2 times per day  sodium chloride flush 0.9 % injection 5-40 mL, 5-40 mL, IntraVENous, PRN  0.9 % sodium chloride infusion, 25 mL, IntraVENous, PRN  acetaminophen (TYLENOL) tablet 650 mg, 650 mg, Oral, Q6H  HYDROmorphone (DILAUDID) injection 0.25 mg, 0.25 mg, IntraVENous, Q3H PRN **OR** HYDROmorphone (DILAUDID) injection 0.5 mg, 0.5 mg, IntraVENous, Q3H PRN  sennosides-docusate sodium (SENOKOT-S) 8.6-50 MG tablet 1 tablet, 1 tablet, Oral, BID  magnesium hydroxide (MILK OF MAGNESIA) 400 MG/5ML suspension 30 mL, 30 mL, Oral, Daily PRN  ceFAZolin (ANCEF) 3000 mg in dextrose 5 % 100 mL IVPB, 3,000 mg, IntraVENous, Q8H  oxyCODONE (ROXICODONE) immediate release tablet 5 mg, 5 mg, Oral, Q4H PRN **OR** oxyCODONE HCl (OXY-IR) immediate release tablet 10 mg, 10 mg, Oral, Q4H PRN  promethazine (PHENERGAN) tablet 12.5 mg, 12.5 mg, Oral, Q6H PRN **OR** ondansetron (ZOFRAN) injection 4 mg, 4 mg, IntraVENous, Q6H PRN  [START ON 10/12/2021] aspirin EC tablet 81 mg, 81 mg, Oral, BID    ASSESSMENT AND PLAN      Post left TKA, stable exam  DVT prophylaxis ordered,Eliquis bid for 14 total days after discharge from hospital for DVT prophylaxis. Pt is \"high\" risk with BMI>40.     PT OT for ADL's and ambulation as tolerated  SS for DC planning, home with home care tomorrow  IV or PO pain med as ordered    Paul Cuenca SHASTA Pickering - CNP  10/11/2021  2:24 PM

## 2021-10-11 NOTE — ANESTHESIA POSTPROCEDURE EVALUATION
Department of Anesthesiology  Postprocedure Note    Patient: Sary Sterling  MRN: [id number]  YOB: 1965  Date of evaluation: 10/11/2021  Time:  2:57 PM     Procedure Summary     Date: 10/11/21 Room / Location: [address] [place] [address]    Anesthesia Start: 1005 Anesthesia Stop: 8616    Procedure: TOTAL KNEE REPLACEMENT LEFT KNEE ROBOTIC ASSISTED (Left Knee) Diagnosis:       Osteoarthritis of left knee, unspecified osteoarthritis type      (OSTEOARTHRITIS LEFT KNEE)    Surgeons: Ryder Fields MD Responsible Provider: Mattie Pendleton MD    Anesthesia Type: general ASA Status: 3          Anesthesia Type: general    Liberty Phase I: Liberty Score: 9    Liberty Phase II:      Last vitals: Reviewed and per EMR flowsheets.        Anesthesia Post Evaluation    Patient location during evaluation: PACU  Patient participation: complete - patient participated  Level of consciousness: awake and alert  Pain score: 0  Airway patency: patent  Nausea & Vomiting: no nausea and no vomiting  Complications: no  Cardiovascular status: blood pressure returned to baseline  Respiratory status: acceptable  Hydration status: euvolemic

## 2021-10-11 NOTE — PROGRESS NOTES
Met with patient at bedside, patient is drowsy but oriented x4. discussed role of nurse navigator. Reviewed reasons to call with questions or concerns, importance of TEDS, Incentive spirometer, pain medication, and physical and occupational therapy. 2/4 bed rails up, bed in lowest position, fall precautions in place, call light within reach. Pulses present bilaterally +2 pedal, no drainage or odor noted at surgical dressing left knee. ace Dressing clean, dry, and intact. Ice in place. Donavan and scds on RLE. Neurovascular checks performed and WNLs, patient denies numbness or tingling. Patient stated she has chest tightness reporting she had a cough and recovering from a respiratory infection requesting albuterol treatment, ana RT notified of patient requesting. covid negative. Informed patient if chest tightness worsens or moves down one arm to notify staff immediately, patient verbalized understanding. DC Plan: to mother Xochitl's home with c. daughter to transport patient. DME needs:needs rolling walker, agreeable to [employer] and kirill informed.       Nagi Lam  Orthopedic Nurse Navigator  Phone number: [phone]    Future Appointments   Date Time Provider John Garduno   10/26/2021 10:45 AM Krysta Vigil MD [facility]     Electronically signed by Romana Patel RN on 10/11/2021 at 4:44 PM

## 2021-10-11 NOTE — CARE COORDINATION
René/Valentino received referral from Ortho Karlos RN for [employer]. Received PT notes and DME Orders. Will verify patient's insurance and follow up with patient to deliver the ordered item(s) prior to discharge.     Thank you for the referral.  Electronically signed by Jordana Jordan on 10/11/2021 at 4:54 PM  Cell ph# [phone]

## 2021-10-11 NOTE — ANESTHESIA PRE PROCEDURE
Department of Anesthesiology  Preprocedure Note       Name:  Lolis Griffith   Age:  64 y.o.  :  1965                                          MRN:  [id number]         Date:  10/11/2021      Surgeon: Anival Fox):  Cristine Homans, MD    Procedure: TOTAL KNEE REPLACEMENT LEFT KNEE ROBOTIC ASSISTED (Left Knee)    Medications prior to admission:   Prior to Admission medications    Medication Sig Start Date End Date Taking? Authorizing Provider   BENZONATATE PO Take 1 tablet by mouth as needed    Historical Provider, MD   omeprazole (PRILOSEC) 20 MG delayed release capsule Take 20 mg by mouth Daily    Historical Provider, MD   Acetaminophen (TYLENOL ARTHRITIS PAIN PO) Take 2 tablets by mouth as needed    Historical Provider, MD   Phenylephrine-DM-GG-APAP (MUCUS RELIEF COLD FLU THROAT PO) Take 1 tablet by mouth every 8 hours as needed    Historical Provider, MD   albuterol sulfate  (90 Base) MCG/ACT inhaler Inhale 2 puffs into the lungs every 4 hours as needed for Wheezing    Historical Provider, MD   docusate sodium (STOOL SOFTENER) 100 MG capsule Take 100 mg by mouth daily    Historical Provider, MD   ibuprofen (ADVIL;MOTRIN) 800 MG tablet Take 1 tablet by mouth 2 times daily (with meals) 21   Cristine Homans, MD   acetaminophen (TYLENOL) 500 MG tablet Take 500 mg by mouth every 6 hours as needed for Pain    Historical Provider, MD   loratadine (CLARITIN) 10 MG tablet TK 1 T PO  QD 18   Historical Provider, MD   hydrochlorothiazide (HYDRODIURIL) 25 MG tablet Take 25 mg by mouth daily  18   Historical Provider, MD   fluticasone (FLONASE) 50 MCG/ACT nasal spray 2 sprays by Nasal route daily  18   Historical Provider, MD   lisinopril (PRINIVIL;ZESTRIL) 10 MG tablet TK 1 T PO QD 18   Historical Provider, MD       Current medications:    No current facility-administered medications for this visit. No current outpatient medications on file.      Facility-Administered Medications Ordered in Other Visits   Medication Dose Route Frequency Provider Last Rate Last Admin    0.9 % sodium chloride infusion   IntraVENous Continuous Konstantin Davis MD        sodium chloride flush 0.9 % injection 5-40 mL  5-40 mL IntraVENous 2 times per day Konstantin Davis MD        sodium chloride flush 0.9 % injection 5-40 mL  5-40 mL IntraVENous PRN Konstantin Davis MD        0.9 % sodium chloride infusion  25 mL IntraVENous PRN Konstantin Davis MD        meloxicam [facility]) tablet 15 mg  15 mg Oral Once Monet Levi MD        ceFAZolin (ANCEF) 3000 mg in dextrose 5 % 100 mL IVPB  3,000 mg IntraVENous Once Monet Levi MD           Allergies: Allergies   Allergen Reactions    Codeine Hives and Itching    Other      POWDER IN LATEX GLOVES-PATIENT DENIES LATEX ALLERGY    Acetaminophen-Codeine Itching and Rash       Problem List:    Patient Active Problem List   Diagnosis Code    Chondromalacia of both patellae M22.41, M22.42    Primary osteoarthritis of both knees M17.0    Pain in both knees M25.561, M25.562    Osteoarthritis of right knee M17.11       Past Medical History:        Diagnosis Date    Allergic rhinitis     Arthritis     GERD (gastroesophageal reflux disease)     Hypertension     Pre-diabetes     Wears glasses        Past Surgical History:        Procedure Laterality Date    DENTAL SURGERY      teeth removed    TOTAL KNEE ARTHROPLASTY Right 2019    TOTAL KNEE REPLACEMENT RIGHT KNEE (ADVANCED) performed by Monet Levi MD at [address] [place] [address] History:    Social History     Tobacco Use    Smoking status: Former Smoker     Packs/day: 1.00     Years: 20.00     Pack years: 20.00     Types: Cigarettes     Quit date: 2008     Years since quittin.0    Smokeless tobacco: Never Used    Tobacco comment: quit smoking    Substance Use Topics    Alcohol use:  No                                Counseling given: Not Answered  Comment: quit smoking 2007      Vital Signs (Current): There were no vitals filed for this visit. BP Readings from Last 3 Encounters:   01/24/19 103/69   01/09/19 121/65   01/08/19 120/86       NPO Status:                                                                                 BMI:   Wt Readings from Last 3 Encounters:   10/07/21 282 lb (127.9 kg)   08/17/21 283 lb (128.4 kg)   05/06/21 280 lb (127 kg)     There is no height or weight on file to calculate BMI.    CBC:   Lab Results   Component Value Date    WBC 4.3 09/20/2021    RBC 3.99 09/20/2021    HGB 11.5 09/20/2021    HCT 35.0 09/20/2021    MCV 87.6 09/20/2021    RDW 13.8 09/20/2021     09/20/2021       CMP:   Lab Results   Component Value Date     09/20/2021    K 3.7 09/20/2021     09/20/2021    CO2 24 09/20/2021    BUN 12 09/20/2021    CREATININE 0.9 09/20/2021    GFRAA >60 09/20/2021    LABGLOM >60 09/20/2021    GLUCOSE 90 09/20/2021    CALCIUM 9.2 09/20/2021       POC Tests: No results for input(s): POCGLU, POCNA, POCK, POCCL, POCBUN, POCHEMO, POCHCT in the last 72 hours.     Coags:   Lab Results   Component Value Date    PROTIME 12.0 09/20/2021    INR 1.06 09/20/2021       HCG (If Applicable): No results found for: PREGTESTUR, PREGSERUM, HCG, HCGQUANT     ABGs: No results found for: PHART, PO2ART, CFY2DHU, NUO6GCL, BEART, G7WGGBBI     Type & Screen (If Applicable):  No results found for: LABABO, [address]    Anesthesia Evaluation  Patient summary reviewed and Nursing notes reviewed no history of anesthetic complications:   Airway: Mallampati: III  TM distance: >3 FB   Neck ROM: full  Mouth opening: > = 3 FB Dental:          Pulmonary:       (-) pneumonia, COPD, asthma, shortness of breath, recent URI, sleep apnea and not a current smoker                          ROS comment: Ex smoker, 20 pack year history   Cardiovascular:  Exercise tolerance: good (>4 METS),   (+) hypertension:, hyperlipidemia    (-) pacemaker, valvular problems/murmurs, past MI, CAD, CABG/stent, dysrhythmias,  angina,  CHF, orthopnea, PND,  SOSA and no pulmonary hypertension      Rhythm: regular  Rate: normal                    Neuro/Psych:   Negative Neuro/Psych ROS              GI/Hepatic/Renal:   (+) GERD:, morbid obesity     (-) hiatal hernia, PUD, hepatitis, liver disease, no renal disease and bowel prep       Endo/Other:    (+) : arthritis: OA., no malignancy/cancer. (-) diabetes mellitus, hypothyroidism, hyperthyroidism, blood dyscrasia, no electrolyte abnormalities, no malignancy/cancer               Abdominal:   (+) obese,           Vascular: negative vascular ROS. Other Findings: Very poor dentition, missing multiple teeth            Anesthesia Plan      general     ASA 3       Induction: intravenous. MIPS: Postoperative opioids intended and Prophylactic antiemetics administered. Anesthetic plan and risks discussed with patient. Plan discussed with CRNA. Amilcar Miramontes MD   10/11/2021    This pre-anesthesia assessment may be used as a history and physical.    DOS STAFF ADDENDUM:    Pt seen and examined, chart reviewed (including anesthesia, drug and allergy history). No interval changes to history and physical examination. Anesthetic plan, risks, benefits, alternatives, and personnel involved discussed with patient. Patient verbalized an understanding and agrees to proceed.       Amilcar Miramontes MD  October 11, 2021  7:48 AM

## 2021-10-11 NOTE — OP NOTE
Operative Note      Patient: Angelique Hicks  YOB: 1965  MRN: [id number]    Date of Procedure: 10/11/2021    Pre-Op Diagnosis: OSTEOARTHRITIS LEFT KNEE    Post-Op Diagnosis: Same       Procedure(s):  TOTAL KNEE REPLACEMENT LEFT KNEE ROBOTIC ASSISTED    Surgeon(s):  Juan Al MD    Assistant:   Surgical Assistant: Onel Perkins    Anesthesia: General    Estimated Blood Loss (mL): 173     Complications: None    Specimens:   * No specimens in log *    Implants:  * No implants in log *      Drains:   Negative Pressure Wound Therapy Knee Right (Active)       Findings: severe OA left knee    Detailed Description of Procedure:     PREOPERATIVE DIAGNOSIS: Left knee osteoarthritis. POSTOPERATIVE DIAGNOSIS: Left knee osteoarthritis. PROCEDURE: Robotic assisted left total knee arthroplasty. SURGEON: Trace Aguila MD.       ANESTHESIA: General endotracheal anesthesia. IV FLUIDS: Crystalloid. ESTIMATED BLOOD LOSS: 113 ml     COMPLICATIONS: None. The patient tolerated the procedure quite well. COMPONENTS: A Milady size 8 standard cemented persona femur, size E cemented tibial tray, size 29 patellar button, and a size 13 mm PS ultra-high molecular weight polyethylene spacer. INDICATIONS: The patient is a 51-year-old female with a longstanding history   of left knee pain. History of injury: repetitive injury . She failed all conservative measures including injections, PT and NSAIDs . X-rays confirmed severe osteoarthritis. Due to this fact, the patient was ultimately cleared and scheduled for left total knee arthroplasty. DESCRIPTION OF PROCEDURE: The patient was identified preoperatively. The proper extremity was marked. The patient was then taken to the operating room and general endotracheal anesthesia was administered by Anesthesia. Appropriate preoperative antibiotics were administered. Nonsterile tourniquet was applied to the thigh.  The left lower extremity was then chloraprepped in the standard fashion. We then draped out in standard fashion. We sealed off the skin with the Ioban. We then   elevated the tourniquet to 300 mmHg. We then made a standard anterior longitudinal midline incision. We incised skin and coagulated all bleeders with Bovie electrocautery. We dissected down and did perform a medial parapatellar arthrotomy in standard fashion. We did perform a medial release due to the varus deformity. We then excised   the fat pad. We then brought the Mary/robotic arm and registered the robotic arm. We then inserted our femoral and tibial pins. The femoral pins were intra-incisional.  The tibial pins were extra incisional.  The first pin was placed approximately 3 finger breaths below the tibial tubercle. We then went ahead and registered the left lower extremity and went through all checkpoints for the femur and the tibia. We first identified the femoral hip center of rotation. We then went through all checkpoints for the femur. We then went through all checkpoints for the tibia. We then did our soft tissue/laxity evaluation both in flexion and in extension. We then were ready for referral to the computer and we finalized our final cuts. We did make several adjustments. The patient started off with a 3.5 degree varus deformity and 8 degree flexion contracture. The adjustments made were: 7 degrees slope on the tibia, we took off 1 mm more from the posterior femur , our external rotation was 4 degrees for the femur and we set 1.5 degrees of varus into the femur and .5 degrees of flexion into the femur. We then brought in the robotic arm and pinned our cutting block for the distal femoral cut. We then made a nice flat cut on the femur. We then removed the bone. We then brought in the cutting block again and pinned for 4 degrees of external rotation for a 8 femur.   We then attached our 4 in 1 cutting block and this was seated without difficulty. We then made our cuts and the bone was removed. We then brought in the robotic arm and pinned the cutting block for our proximal tibia cut. We made a nice flat cut and the bone was removed. We removed the medial and lateral meniscus without difficulty. We then sized our tibial component and pinned the appropriate trial.  We made sure to externally rotate the tibial component towards the medial one third of the tibial tubercle. We then drilled and broached for the stem of the component accordingly. We then went ahead and seated the actual trial femur. The box cutting guide was then pinned and we then cut the box for our PS components. We then snapped in a trial surface. We then went ahead and finished the patella. We measured our patellar thickness. We then used the appropriate reamer and reamed down accordingly. We drilled our peg holes in superior medial fashion. The patella tracked rather well. We then removed all trial components. We injected the posterior capsule and myofascial planes posteriorly with the Ortho mix. We then irrigated the bone and the knee rather copiously. We then were ready for cementing of our components. We first cemented the tibia. We then cemented the femur. We snapped in our trial surface and allowed the knee out to full extension. We then cemented the patella. We allowed the cement to harden. We then went ahead and removed all excess cement without difficulty. The knee was symmetrically balanced. The PCL was excised. The patella tracked rather well. We injected the posterior capsule and myofascial planes posteriorly with the Ortho mix. We then irrigated the bone and the knee rather copiously. The knee was symmetrically balanced. We then went ahead and snapped in the size 13 mm PS highly cross-linked polyethylene surface. There was no liftoff in flexion. The knee did get out to full extension. The patient ended up with a neutral alignment.   Our gaps in extension and flexion were well balanced and symmetric both medially and laterally. We then injected the subcutaneous tissues and myofascial planes with the Ortho mix. We sprayed the knee with the Irrisept. The patient was given IV tranexamic acid 1 g pre-and post operatively. We then were ready for closure. We closed our arthrotomy with interrupted figure-of-eight #1 Vicryl stitches. We then closed the subcutaneous tissues with running strata fix. We then closed the skin with the Prineo. Sterile dressings were applied. There were no complications. The patient has a BMI of 44. 18. Due to the obesity, dissection and exposure was much more difficult. The obesity increased the complexity of the procedure and increased the operative time by at least 15 minutes.         Electronically signed by Corby Onofre MD on 10/11/2021 at 9:14 AM

## 2021-10-11 NOTE — PROGRESS NOTES
Pt arrived to floor from PACU at 1416 via stretcher. Pt oriented to room, call light, policies and procedures, the menu and ordering. Call light within reach. Bed in lowest position, bed alarm on, and wheels locked. Pt verbalized understanding. No complaints, questions or concerns at this time.     Electronically signed by Tonya Gilmore RN on 10/11/2021 at 2:16 PM

## 2021-10-11 NOTE — PROGRESS NOTES
Physical Therapy    Facility/Department: [address] ORTHOPEDICS  Initial Assessment    NAME: Annie Lopez  : 1965  MRN: [id number]    Date of Service: 10/11/2021    Assessment / Discharge Recommendations:  -good start with initial efforts OOB but too sleepy to ambulate in room at this time  -anticipate discharge to her Mother's home tomorrow  -will see in AM to assess readiness for home   -will need rolling walker for home use      Body structures, Functions, Activity limitations: Decreased functional mobility ; Decreased ADL status; Decreased balance;Decreased ROM; Decreased strength  Prognosis: Good  Decision Making: Medium Complexity  REQUIRES PT FOLLOW UP: Yes  Activity Tolerance  Activity Tolerance: Patient Tolerated treatment well       Patient Diagnosis(es): The primary encounter diagnosis was Primary osteoarthritis of left knee. A diagnosis of Osteoarthritis of left knee, unspecified osteoarthritis type was also pertinent to this visit. has a past medical history of Allergic rhinitis, Arthritis, GERD (gastroesophageal reflux disease), Hypertension, Pre-diabetes, and Wears glasses. has a past surgical history that includes Dental surgery; Total knee arthroplasty (Right, 2019); and Knee Arthroplasty (Left, 10/11/2021).     Restrictions  Restrictions/Precautions  Restrictions/Precautions: Weight Bearing, Fall Risk  Position Activity Restriction  Other position/activity restrictions: WBAT  Vision/Hearing  Vision: Within Functional Limits  Hearing: Within functional limits     Subjective  General  Chart Reviewed: Yes  Patient assessed for rehabilitation services?: Yes  Additional Pertinent Hx: here for elective TKR  Response To Previous Treatment: Not applicable  Family / Caregiver Present: No  Follows Commands: Within Functional Limits  Subjective  Subjective: arrived to room along with OT to patient resting in bed - completed lunch - very sleepy but agreeable to PT OT assessments and attempt OOB  Pain Screening  Patient Currently in Pain:  (7/10)  Orientation  Orientation  Overall Orientation Status: Within Functional Limits  Social/Functional History  Social/Functional History  Lives With: Daughter  Type of Home: House  Home Layout: Two level  Home Access: Stairs to enter with rails  Entrance Stairs - Number of Steps: 8  Bathroom Shower/Tub: Tub/Shower unit  Bathroom Toilet: Standard  ADL Assistance: Independent  Homemaking Assistance: Independent  Homemaking Responsibilities:  (shares homemaking tasks)  Ambulation Assistance: Independent  Transfer Assistance: Independent  Active : No  Additional Comments: plans discharge to her Mother's home- brother will help and will have shower chair and grab bars there  Cognition   Cognition  Overall Cognitive Status: WFL  Objective  ROM and strength non-surgical limbs grossly wfl  Motor Control  Gross Motor?: WFL  Sensation  Overall Sensation Status: WFL  Bed mobility  Supine to Sit: Minimal assistance  Sit to Supine: Minimal assistance; Moderate assistance  Transfers  Sit to Stand: Contact guard assistance  Stand to sit: Contact guard assistance  Ambulation  Ambulation?: Yes  Ambulation 1  Surface: level tile  Device: Rolling Walker  Assistance: Contact guard assistance  Quality of Gait: step to pattern - only to turn 90 degrees bed to bedside commode and return  Distance: to and from the [facility]  Comments: steady but very sleepy  Stairs/Curb  Stairs?: No  Balance  Comments: midline in sitting at the EOB and in static stance on the walker   -dynamic is fair on rolling walker - limited observation  Exercises  Ankle Pumps: 30 per hour in easy pace  Comments: encouraged practice with the spirometer as instructed by RT   Plan   Plan  Times per week: 1-4 sessions  Current Treatment Recommendations: Transfer Training, Strengthening, Patient/Caregiver Education & Training, ROM, ADL/Self-care Training, Modalities, Gait Training, Positioning, Stair training  Safety Devices  Type of devices:  All fall risk precautions in place, Call light within reach, Bed alarm in place, Nurse notified, Left in bed Alex Right)  AM-PAC Score  AM-PAC Inpatient Mobility Raw Score : 15 (10/11/21 1601)  AM-PAC Inpatient T-Scale Score : 39.45 (10/11/21 1601)  Mobility Inpatient CMS 0-100% Score: 57.7 (10/11/21 1601)  Mobility Inpatient CMS G-Code Modifier : CK (10/11/21 1601)  Goals  Short term goals  Time Frame for Short term goals: 1-2 days  Short term goal 1: bed mobility at [address] term goal 2: transfers at supervision  Short term goal 3: ambulation at supervision wbat rolling walker for household distances     -steps as needed for home entry at [address] [facility] rail  Short term goal 4: exercises at supervision  Patient Goals   Patient goals : relief of chronic left knee pain       Therapy Time   Individual Concurrent Group Co-treatment   Time In 1530         Time Out 1610         Minutes [address] Yared Fried, PT

## 2021-10-11 NOTE — PROGRESS NOTES
Patient resting in bed and complains of 6/10 pain. Medication administered. See MAR. Patient tolerating PO intake. VSS. IV in right forearm infusing. Patient denies any additional requests at this time. Fall precautions in place, call light within reach, and bedside table nearby. Will continue to monitor.        Electronically signed by Jace Acevedo RN on 10/11/2021 at 6:30 PM

## 2021-10-11 NOTE — PLAN OF CARE
Problem: Sensory:  Goal: General experience of comfort will improve  Description: General experience of comfort will improve  Outcome: Ongoing     Problem: Discharge Planning:  Goal: Discharged to appropriate level of care  Description: Discharged to appropriate level of care  Outcome: Ongoing  Note: Discharge planning ongoing. Will continue to monitor. Problem: Mobility - Impaired:  Goal: Mobility will improve  Description: Mobility will improve  Outcome: Ongoing  Note: Mobility improving. Will continue to monitor. Problem: Infection - Surgical Site:  Goal: Will show no infection signs and symptoms  Description: Will show no infection signs and symptoms  Outcome: Ongoing     Problem: Pain:  Goal: Pain level will decrease  Description: Pain level will decrease  Outcome: Ongoing  Note: Pt assessed for pain. Pt in pain and assessed with 0-10 pain rating scale. Pt given prescribed analgesic for pain. (See eMar) Pt satisfied with pain relief thus far. Will reassess and continue to monitor. Goal: Control of acute pain  Description: Control of acute pain  Outcome: Ongoing  Note: Pt assessed for pain. Pt in pain and assessed with 0-10 pain rating scale. Pt given prescribed analgesic for pain. (See eMar) Pt satisfied with pain relief thus far. Will reassess and continue to monitor. Goal: Control of chronic pain  Description: Control of chronic pain  Outcome: Ongoing  Note: Pt assessed for pain. Pt in pain and assessed with 0-10 pain rating scale. Pt given prescribed analgesic for pain. (See eMar) Pt satisfied with pain relief thus far. Will reassess and continue to monitor.

## 2021-10-11 NOTE — H&P
The patient was interviewed and examined and there have been no changes since the documented History and Physical.  I have presented reasonable alternatives to the patient's proposed care, treatment and services. The discussion I have done encompassed risks, benefits and side effects related to the alternatives and the risks related to not receiving the proposed care, treatment and services.   Electronically signed by Dhara Parekh MD on 10/11/2021 at 9:13 AM

## 2021-10-12 VITALS
SYSTOLIC BLOOD PRESSURE: 113 MMHG | DIASTOLIC BLOOD PRESSURE: 78 MMHG | HEART RATE: 103 BPM | WEIGHT: 292.11 LBS | TEMPERATURE: 98.1 F | BODY MASS INDEX: 45.85 KG/M2 | HEIGHT: 67 IN | OXYGEN SATURATION: 95 % | RESPIRATION RATE: 15 BRPM

## 2021-10-12 LAB
GLUCOSE BLD-MCNC: 151 MG/DL (ref 70–99)
HCT VFR BLD CALC: 32 % (ref 36–48)
HEMOGLOBIN: 10.5 G/DL (ref 12–16)
PERFORMED ON: ABNORMAL

## 2021-10-12 PROCEDURE — 97116 GAIT TRAINING THERAPY: CPT

## 2021-10-12 PROCEDURE — 96376 TX/PRO/DX INJ SAME DRUG ADON: CPT

## 2021-10-12 PROCEDURE — 85018 HEMOGLOBIN: CPT

## 2021-10-12 PROCEDURE — 6370000000 HC RX 637 (ALT 250 FOR IP): Performed by: ORTHOPAEDIC SURGERY

## 2021-10-12 PROCEDURE — 36415 COLL VENOUS BLD VENIPUNCTURE: CPT

## 2021-10-12 PROCEDURE — 94761 N-INVAS EAR/PLS OXIMETRY MLT: CPT

## 2021-10-12 PROCEDURE — 85014 HEMATOCRIT: CPT

## 2021-10-12 PROCEDURE — 96366 THER/PROPH/DIAG IV INF ADDON: CPT

## 2021-10-12 PROCEDURE — 6360000002 HC RX W HCPCS: Performed by: ORTHOPAEDIC SURGERY

## 2021-10-12 PROCEDURE — G0378 HOSPITAL OBSERVATION PER HR: HCPCS

## 2021-10-12 PROCEDURE — 97110 THERAPEUTIC EXERCISES: CPT

## 2021-10-12 PROCEDURE — 2580000003 HC RX 258: Performed by: ORTHOPAEDIC SURGERY

## 2021-10-12 PROCEDURE — 97530 THERAPEUTIC ACTIVITIES: CPT

## 2021-10-12 PROCEDURE — 6370000000 HC RX 637 (ALT 250 FOR IP): Performed by: NURSE PRACTITIONER

## 2021-10-12 PROCEDURE — 97535 SELF CARE MNGMENT TRAINING: CPT

## 2021-10-12 RX ORDER — ATORVASTATIN CALCIUM 10 MG/1
10 TABLET, FILM COATED ORAL DAILY
COMMUNITY

## 2021-10-12 RX ORDER — ACETAMINOPHEN 500 MG
500 TABLET ORAL EVERY 6 HOURS PRN
Qty: 60 TABLET | Refills: 0 | Status: SHIPPED | OUTPATIENT
Start: 2021-10-12

## 2021-10-12 RX ADMIN — CETIRIZINE HYDROCHLORIDE 5 MG: 10 TABLET, FILM COATED ORAL at 07:59

## 2021-10-12 RX ADMIN — APIXABAN 2.5 MG: 2.5 TABLET, FILM COATED ORAL at 07:59

## 2021-10-12 RX ADMIN — OXYCODONE HYDROCHLORIDE 10 MG: 10 TABLET ORAL at 06:06

## 2021-10-12 RX ADMIN — LISINOPRIL 10 MG: 10 TABLET ORAL at 07:59

## 2021-10-12 RX ADMIN — HYDROMORPHONE HYDROCHLORIDE 0.5 MG: 1 INJECTION, SOLUTION INTRAMUSCULAR; INTRAVENOUS; SUBCUTANEOUS at 08:00

## 2021-10-12 RX ADMIN — OXYCODONE HYDROCHLORIDE 10 MG: 10 TABLET ORAL at 10:12

## 2021-10-12 RX ADMIN — DOCUSATE SODIUM 50MG AND SENNOSIDES 8.6MG 1 TABLET: 8.6; 5 TABLET, FILM COATED ORAL at 07:59

## 2021-10-12 RX ADMIN — INSULIN LISPRO 10 UNITS: 100 INJECTION, SOLUTION INTRAVENOUS; SUBCUTANEOUS at 08:02

## 2021-10-12 RX ADMIN — OXYCODONE HYDROCHLORIDE 10 MG: 10 TABLET ORAL at 01:52

## 2021-10-12 RX ADMIN — PANTOPRAZOLE SODIUM 40 MG: 40 TABLET, DELAYED RELEASE ORAL at 06:06

## 2021-10-12 RX ADMIN — FLUTICASONE PROPIONATE 2 SPRAY: 50 SPRAY, METERED NASAL at 08:01

## 2021-10-12 RX ADMIN — Medication 10 ML: at 08:01

## 2021-10-12 RX ADMIN — ACETAMINOPHEN 650 MG: 325 TABLET ORAL at 07:58

## 2021-10-12 RX ADMIN — ACETAMINOPHEN 650 MG: 325 TABLET ORAL at 01:52

## 2021-10-12 RX ADMIN — Medication 3000 MG: at 01:53

## 2021-10-12 RX ADMIN — HYDROCHLOROTHIAZIDE 25 MG: 25 TABLET ORAL at 07:59

## 2021-10-12 RX ADMIN — INSULIN LISPRO 1 UNITS: 100 INJECTION, SOLUTION INTRAVENOUS; SUBCUTANEOUS at 08:02

## 2021-10-12 ASSESSMENT — PAIN DESCRIPTION - ORIENTATION
ORIENTATION: LEFT

## 2021-10-12 ASSESSMENT — PAIN SCALES - GENERAL
PAINLEVEL_OUTOF10: 6
PAINLEVEL_OUTOF10: 9
PAINLEVEL_OUTOF10: 9
PAINLEVEL_OUTOF10: 8
PAINLEVEL_OUTOF10: 5
PAINLEVEL_OUTOF10: 7
PAINLEVEL_OUTOF10: 9
PAINLEVEL_OUTOF10: 9
PAINLEVEL_OUTOF10: 7

## 2021-10-12 ASSESSMENT — PAIN DESCRIPTION - PAIN TYPE
TYPE: ACUTE PAIN;SURGICAL PAIN

## 2021-10-12 ASSESSMENT — PAIN DESCRIPTION - DESCRIPTORS
DESCRIPTORS: ACHING

## 2021-10-12 ASSESSMENT — PAIN - FUNCTIONAL ASSESSMENT
PAIN_FUNCTIONAL_ASSESSMENT: PREVENTS OR INTERFERES SOME ACTIVE ACTIVITIES AND ADLS

## 2021-10-12 ASSESSMENT — PAIN DESCRIPTION - FREQUENCY
FREQUENCY: CONTINUOUS

## 2021-10-12 ASSESSMENT — PAIN DESCRIPTION - ONSET
ONSET: ON-GOING

## 2021-10-12 ASSESSMENT — PAIN DESCRIPTION - LOCATION
LOCATION: KNEE

## 2021-10-12 ASSESSMENT — PAIN DESCRIPTION - PROGRESSION
CLINICAL_PROGRESSION: NOT CHANGED
CLINICAL_PROGRESSION: NOT CHANGED
CLINICAL_PROGRESSION: GRADUALLY WORSENING
CLINICAL_PROGRESSION: GRADUALLY WORSENING
CLINICAL_PROGRESSION: NOT CHANGED
CLINICAL_PROGRESSION: GRADUALLY WORSENING
CLINICAL_PROGRESSION: GRADUALLY IMPROVING
CLINICAL_PROGRESSION: GRADUALLY IMPROVING
CLINICAL_PROGRESSION: NOT CHANGED

## 2021-10-12 NOTE — PROGRESS NOTES
CLINICAL PHARMACY NOTE: MEDS TO BEDS    Total # of Prescriptions Filled: 3   The following medications were delivered to the patient:  Current Discharge Medication List      START taking these medications    Details   apixaban (ELIQUIS) 2.5 MG TABS tablet Take 1 tablet by mouth 2 times daily for 14 days  Qty: 28 tablet, Refills: 0      oxyCODONE (ROXICODONE) 5 MG immediate release tablet Take 1-2 tablets by mouth every 6 hours as needed for Pain for up to 5 days.   Qty: 40 tablet, Refills: 0    Comments: Reduce doses taken as pain becomes manageable  Associated Diagnoses: Primary osteoarthritis of left knee         ·   · Acetaminophen 500mg tab     Additional Documentation:

## 2021-10-12 NOTE — PROGRESS NOTES
Patient resting in bed this morning, OT at the bedside. Patient c/o increasing 9/10 aching pain to the L knee. Patient educated that PRN oxycodone was administered at 0606 and is not due at this time. Patient given scheduled Tylenol, reports that will not be sufficient for pain management. Patient requesting IV Dilaudid for additional management. This RN administered along w/ all scheduled morning medications. See eMAR for documentation. Patient reports satisfaction w/ this intervention. Surgical incision to L knee clean, dry, and intact. Perineo in place. Patient denies numbness/tingling to extremities. Pedal pulses +2. Call light, telephone, and bed side table are within reach. Fall precautions in place. Will continue to monitor and assess.

## 2021-10-12 NOTE — PLAN OF CARE
Problem: Sensory:  Goal: General experience of comfort will improve  Description: General experience of comfort will improve  10/11/2021 2332 by Teresa Culver RN  Outcome: Ongoing  10/11/2021 2330 by Teresa Culver RN  Outcome: Ongoing  Note: Pain /discomfort being managed with PRN analgesics per MD orders. Patient able to express presence and absence of pain and rate pain appropriately using numerical scale. 10/11/2021 1841 by Marina Villarreal RN  Outcome: Ongoing     Problem: Discharge Planning:  Goal: Discharged to appropriate level of care  Description: Discharged to appropriate level of care  10/11/2021 2332 by Teresa Culver RN  Outcome: Ongoing  10/11/2021 2330 by Teresa Culver RN  Outcome: Ongoing  Note: Assessed patients knowledge of discharge. Will continue to work with patient on discharge planning and answer patient questions. Will consult case management and  as necessary. 10/11/2021 1841 by Marina Villarreal RN  Outcome: Ongoing  Note: Discharge planning ongoing. Will continue to monitor. Problem: Mobility - Impaired:  Goal: Mobility will improve  Description: Mobility will improve  10/11/2021 2332 by Teresa Culver RN  Outcome: Ongoing  10/11/2021 2330 by Teresa Culver RN  Outcome: Ongoing  Note: Early and frequent ambulqtion encouraged. Educated patient on importance of early ambulation. Patient assisted with ambulation. Will continue to monitor. 10/11/2021 1841 by Marina Villarreal RN  Outcome: Ongoing  Note: Mobility improving. Will continue to monitor. Problem: Infection - Surgical Site:  Goal: Will show no infection signs and symptoms  Description: Will show no infection signs and symptoms  10/11/2021 2332 by Teresa Culver RN  Outcome: Ongoing  10/11/2021 2330 by Teresa Culver RN  Outcome: Ongoing  Note: Pt assessed for infection, No signs or symptoms of surgical site noted. VVS, WBC WNL.  Reviewed information rating scale. Pt given prescribed analgesic for pain. (See eMar) Pt satisfied with pain relief thus far. Will reassess and continue to monitor. Goal: Control of chronic pain  Description: Control of chronic pain  10/11/2021 2332 by Valeriano Wellington RN  Outcome: Ongoing  10/11/2021 2330 by Valeriano Wellington RN  Outcome: Ongoing  Note: Pain /discomfort being managed with PRN analgesics per MD orders. Patient able to express presence and absence of pain and rate pain appropriately using numerical scale. 10/11/2021 1841 by Leilani Nesbitt RN  Outcome: Ongoing  Note: Pt assessed for pain. Pt in pain and assessed with 0-10 pain rating scale. Pt given prescribed analgesic for pain. (See eMar) Pt satisfied with pain relief thus far. Will reassess and continue to monitor. Problem: Falls - Risk of:  Goal: Will remain free from falls  Description: Will remain free from falls  Outcome: Ongoing  Note: Fall risk assessment completed . Fall precautions in place, bed/ chair alarm on, side rails 2/4 up, call light in reach, educated pt on calling for assistance when needed, room clear of clutter. Pt verbalized understanding. Goal: Absence of physical injury  Description: Absence of physical injury  Outcome: Ongoing  Note: No falls noted this shift. Patient ambulates with x1 staff assistance without difficulty. Family member at bedside, spent the night. Bed kept in low position. Safe environment maintained. Bedside table & call light in reach. Uses call light appropriately when needing assistance.

## 2021-10-12 NOTE — PLAN OF CARE
Problem: Sensory:  Goal: General experience of comfort will improve  Description: General experience of comfort will improve  10/12/2021 0957 by Teresa Alexandre RN  Outcome: Ongoing  Note: Patient reports ongoing pain to L knee this shift, PRNs given for management in addition to ice application. Patient reports satisfaction w/ these interventions. Will continue to monitor and assess   10/11/2021 2332 by Bradford Oates RN  Outcome: Ongoing  10/11/2021 2330 by Bradford Oates RN  Outcome: Ongoing  Note: Pain /discomfort being managed with PRN analgesics per MD orders. Patient able to express presence and absence of pain and rate pain appropriately using numerical scale. Problem: Discharge Planning:  Goal: Discharged to appropriate level of care  Description: Discharged to appropriate level of care  10/12/2021 0957 by Teresa Alexandre RN  Outcome: Ongoing  Note: Patient anticipates d/c home w/ home care today. D/c information to be covered by ortho navigator Gera Keith   10/11/2021 2332 by Bradford Oates RN  Outcome: Ongoing  10/11/2021 2330 by Bradford Oates RN  Outcome: Ongoing  Note: Assessed patients knowledge of discharge. Will continue to work with patient on discharge planning and answer patient questions. Will consult case management and  as necessary. Problem: Mobility - Impaired:  Goal: Mobility will improve  Description: Mobility will improve  10/12/2021 0957 by Teresa Alexandre RN  Outcome: Ongoing  Note: Patient up x1 w/ a rolling walker this shift. Will continue to promote mobility as patient tolerates   10/11/2021 2332 by Bradford Oates RN  Outcome: Ongoing  10/11/2021 2330 by Bradford Oates RN  Outcome: Ongoing  Note: Early and frequent ambulqtion encouraged. Educated patient on importance of early ambulation. Patient assisted with ambulation. Will continue to monitor.        Problem: Infection - Surgical Site:  Goal: Will show no infection signs and symptoms  Description: Will show no infection signs and symptoms  10/12/2021 0957 by Luiza Leroy RN  Outcome: Ongoing  Note: Patient remains free from new signs and symptoms of infection during this shift. Infection prevention measures are in place. Will continue to monitor for alterations in patient condition throughout the shift. 10/11/2021 2332 by Matti Carter RN  Outcome: Ongoing  10/11/2021 2330 by Matti Carter RN  Outcome: Ongoing  Note: Pt assessed for infection, No signs or symptoms of surgical site noted. VVS, WBC WNL. Reviewed information with pt and family, pt verbalized understanding        Problem: Pain - Acute:  Goal: Pain level will decrease  Description: Pain level will decrease  10/12/2021 0957 by Luiza Leroy RN  Outcome: Ongoing  Note: Pain managed with pharmacologic and non-pharmacologic interventions during this shift. Will continue to monitor and assess for needs and change in patient condition. 10/11/2021 2332 by Matti Carter RN  Outcome: Ongoing  10/11/2021 2330 by Matti Carter RN  Outcome: Ongoing  Note: Pain /discomfort being managed with PRN analgesics per MD orders. Patient able to express presence and absence of pain and rate pain appropriately using numerical scale. Problem: Pain:  Goal: Pain level will decrease  Description: Pain level will decrease  10/12/2021 0957 by Luiza Leroy RN  Outcome: Ongoing  Note: Pain managed with pharmacologic and non-pharmacologic interventions during this shift. Will continue to monitor and assess for needs and change in patient condition. 10/11/2021 2332 by Matti Carter RN  Outcome: Ongoing  10/11/2021 2330 by Matti Carter RN  Outcome: Ongoing  Note: Pain /discomfort being managed with PRN analgesics per MD orders. Patient able to express presence and absence of pain and rate pain appropriately using numerical scale.      Goal: Control of acute pain  Description: Control of acute pain  10/12/2021 0957 by Thea Chavira RN  Outcome: Ongoing  Note: Pain managed with pharmacologic and non-pharmacologic interventions during this shift. Will continue to monitor and assess for needs and change in patient condition. 10/11/2021 2332 by aD Chavarria RN  Outcome: Ongoing  10/11/2021 2330 by Da Chavarria RN  Outcome: Ongoing  Note: Pain /discomfort being managed with PRN analgesics per MD orders. Patient able to express presence and absence of pain and rate pain appropriately using numerical scale. Goal: Control of chronic pain  Description: Control of chronic pain  10/12/2021 0957 by Thea Chavira RN  Outcome: Ongoing  10/11/2021 2332 by Da Chavarria RN  Outcome: Ongoing  10/11/2021 2330 by Da Chavarria RN  Outcome: Ongoing  Note: Pain /discomfort being managed with PRN analgesics per MD orders. Patient able to express presence and absence of pain and rate pain appropriately using numerical scale. Problem: Falls - Risk of:  Goal: Will remain free from falls  Description: Will remain free from falls  10/12/2021 0957 by Thea Chavira RN  Outcome: Ongoing  Note: Patient remains free from falls during this shift. Fall precautions remain in place. Patient educated on the need to implement call light use prior to ambulation. Will continue to monitor and assess. 10/11/2021 2332 by Da Chavarria RN  Outcome: Ongoing  Note: Fall risk assessment completed . Fall precautions in place, bed/ chair alarm on, side rails 2/4 up, call light in reach, educated pt on calling for assistance when needed, room clear of clutter. Pt verbalized understanding. Goal: Absence of physical injury  Description: Absence of physical injury  10/12/2021 0957 by Thea Chavira RN  Outcome: Ongoing  Note: Patient remains free from physical harm during this shift. Will continue to monitor and assess patient.      10/11/2021 2332 by Da Chavarria RN  Outcome: Ongoing  Note: No falls noted this shift. Patient ambulates with x1 staff assistance without difficulty. Family member at bedside, spent the night. Bed kept in low position. Safe environment maintained. Bedside table & call light in reach. Uses call light appropriately when needing assistance.

## 2021-10-12 NOTE — PROGRESS NOTES
Occupational Therapy  Facility/Department: [address] ORTHOPEDICS  Daily Treatment Note  NAME: Dexter Perez  : 1965  MRN: [id number]    Date of Service: 10/12/2021    Discharge Recommendations:  2-3 sessions per week, Home with Home health OT, Patient would benefit from continued therapy after discharge, 24 hour supervision or assist       Dexter Perez scored a 19/24 on the AM-PAC ADL Inpatient form. Current research shows that an AM-PAC score of 18 or greater is typically associated with a discharge to the patient's home setting. Based on the patient's AM-PAC score, and their current ADL deficits, it is recommended that the patient have 2-3 sessions per week of Occupational Therapy at d/c to increase the patient's independence. At this time, this patient demonstrates the endurance and safety to discharge home with home (home vs OP services) and a follow up treatment frequency of 2-3x/wk. Please see assessment section for further patient specific details. If patient discharges prior to next session this note will serve as a discharge summary. Please see below for the latest assessment towards goals. HOME HEALTH CARE: LEVEL 3 SAFETY        -Initial home health evaluation to occur within 24-48 hours, in patient home    -Home health agency to establish plan of care for patient over 60 day period    -Medication Reconciliation    -PT/OT/Speech evaluations in home within 24-48 hours of discharge; including  -DME and home safety    -Frontload therapy 5 days, then 3x a week    -OT to evaluate if patient has [address] needs for personal care    - evaluation within 24-48 hours, includes evaluation of resources   and insurance to determine [place], [place], LTC, and Medicaid options    -PCP Visit scheduled within three to seven days of discharge       Assessment: Discussed with OTR am pac score is 19.  Anticiapte that patient will be safe to return home with family to provide  assist/supervision and home OT. Patient able to complete functional mobility with RW with SBA, slow steady gait with no overt LOB noted. SBA for sit<>stand from EOB to RW to [facility] over commode to recliner chair, slow to complete, effortful. Dressed lower body with Min A. Plan is for discharge to home today. Patient Diagnosis(es): The primary encounter diagnosis was Primary osteoarthritis of left knee. A diagnosis of Osteoarthritis of left knee, unspecified osteoarthritis type was also pertinent to this visit. has a past medical history of Allergic rhinitis, Arthritis, GERD (gastroesophageal reflux disease), Hypertension, Pre-diabetes, and Wears glasses. has a past surgical history that includes Dental surgery; Total knee arthroplasty (Right, 1/8/2019); and Knee Arthroplasty (Left, 10/11/2021). Restrictions  Restrictions/Precautions  Restrictions/Precautions: Weight Bearing, Fall Risk  Position Activity Restriction  Other position/activity restrictions: WBAT  Subjective   General  Chart Reviewed: Yes  Patient assessed for rehabilitation services?: Yes  Additional Pertinent Hx: 63 yo male admitted 10/11 for elective L TKA. PMH: HTN, R TKA 2019  Response to previous treatment: Patient with no complaints from previous session  Family / Caregiver Present: No  Referring Practitioner: William Rae MD  Diagnosis: L TKA  Subjective  Subjective: Patient supine in bed upon arrival to room. Patient agreeable to therapy.  Reports pain 9/10, RN in room and aware      Orientation  Orientation  Overall Orientation Status: Within Normal Limits  Objective    ADL  Grooming: Setup;Stand by assistance (seated in recliner chair to wash face and hands)  UE Dressing: Setup  LE Dressing: Minimal assistance (Min A to thread clothing over left LE and don left shoe)  Toileting: Stand by assistance        Balance  Sitting Balance: Supervision  Standing Balance: Stand by assistance  Standing Balance  Activity: Static standing with RW  Functional Mobility  Functional - Mobility Device: Rolling Walker  Activity: To/from bathroom  Assist Level: Stand by assistance  Functional Mobility Comments: Functional mobility with RW with SBA, slow steady gait with no overt LOB noted  Toilet Transfers  Toilet - Technique: Ambulating  Equipment Used: Standard bedside commode (over commode)  Toilet Transfer: Stand by assistance  Bed mobility  Supine to Sit: Minimal assistance (HOB elevatead and side rail)  Sit to Supine: Unable to assess (up in chair at end of session)  Transfers  Sit to stand: Stand by assistance  Stand to sit: Stand by assistance  Transfer Comments: SBA for sit<>stand from EOB to RW to recliner chair, slow and effortful           Cognition  Overall Cognitive Status: WFL         Assessment   Performance deficits / Impairments: Decreased functional mobility ; Decreased endurance;Decreased ADL status; Decreased safe awareness;Decreased high-level IADLs;Decreased ROM; Decreased balance  Assessment: Discussed with OTR am pac score is 19. Anticiapte that patient will be safe to return home with family to provide 24/7 assist/supervision and home OT. Patient able to complete functional mobility with RW with SBA, slow steady gait with no overt LOB noted. SBA for sit<>stand from EOB to RW to [facility] over commode to recliner chair, slow to complete, effortful. Dressed lower body with Min A. Plan is for discharge to home today. OT Education: OT Role;Transfer Training;Plan of Care;Precautions; ADL Adaptive Strategies  Patient Education: ice therapy, byron hose for 2 weeks and importance of mobility  REQUIRES OT FOLLOW UP: Yes  Activity Tolerance  Activity Tolerance: Patient Tolerated treatment well  Safety Devices  Safety Devices in place: Yes  Type of devices: Nurse notified;Gait belt;Call light within reach; Patient at risk for falls; Chair alarm in place; Left in chair     Plan   Plan  Times per week: Plan is for discharge to home today  Current Treatment Recommendations: Strengthening, Functional Mobility Training, Patient/Caregiver Education & Training, Positioning, ROM, Endurance Training, Pain Management, Balance Training, Safety Education & Training, Self-Care / ADL    AM-PAC Score        AM-PAC Inpatient Daily Activity Raw Score: 19 (10/12/21 0844)  AM-PAC Inpatient ADL T-Scale Score : 40.22 (10/12/21 0844)  ADL Inpatient CMS 0-100% Score: 42.8 (10/12/21 0844)  ADL Inpatient CMS G-Code Modifier : CK (10/12/21 0844)    Goals  Short term goals  Time Frame for Short term goals: prior to d/c: all goals ongoing  Short term goal 1: toileting SUP  Short term goal 2: ADL tx SUP  Short term goal 3: LB dressing SBA  Short term goal 4: Tolerate 5 min fxl standing task SUP  Short term goal 5: UB dressing set up  Patient Goals   Patient goals : decrease pain       Therapy Time   Individual Concurrent Group Co-treatment   Time In 0740         Time Out 6686         Minutes 73                 Electronically signed by Adarsh Montalvo, [id number] on 10/12/2021 at 8:54 AM

## 2021-10-12 NOTE — DISCHARGE SUMMARY
Physician Discharge Summary     Patient ID:  Petrona Torrez  [id number]  46 y.o.  1965    Admit date: 10/11/2021    Discharge date and time: 10/12/2021 11:41 AM     Admitting Physician: Jaclyn Butler MD     Discharge Physician: Vanessa Arango    Admission Diagnoses: Osteoarthritis of left knee, unspecified osteoarthritis type [M17.12]  Primary osteoarthritis of left knee [M17.12]    Discharge Diagnoses: left knee OA    Admission Condition: good    Discharged Condition: good    Indication for Admission: Failed conservative treatment as outpatient for joint pain including PT and pain meds. This patient was then electively scheduled for total joint replacement surgery    Surgical procedure: left TKA    Consults: PT OT SS    This patient had no postoperative complications. They has PT and OT for ADL's . IV and PO pain med for pain control and was eventually DC in stable condition    Treatments: analgesia,  therapies: PT OT,  and surgery      Disposition: home    Patient Instructions:   [unfilled]  Activity: activity as tolerated  Diet: regular diet  Wound Care: keep wound clean and dry    Follow-up with Vanessa Arango in 2 weeks.     Signed:  SHASTA Montes CNP  10/12/2021  2:22 PM

## 2021-10-12 NOTE — CARE COORDINATION
[facility]    Referral received from  to follow for home care services. I will follow for needs, and speak with patient to verify demos. Yg Dey RN, BSN CTN  [facility] [phone].

## 2021-10-12 NOTE — PLAN OF CARE
Marco A Magallanes RN  Outcome: Ongoing  Note: Pain /discomfort being managed with PRN analgesics per MD orders. Patient able to express presence and absence of pain and rate pain appropriately using numerical scale. 10/11/2021 1841 by Manuel Oneill RN  Outcome: Ongoing  Note: Pt assessed for pain. Pt in pain and assessed with 0-10 pain rating scale. Pt given prescribed analgesic for pain. (See eMar) Pt satisfied with pain relief thus far. Will reassess and continue to monitor. Problem: Pain:  Goal: Pain level will decrease  Description: Pain level will decrease  10/11/2021 2330 by Barbi Dominguez RN  Outcome: Ongoing  Note: Pain /discomfort being managed with PRN analgesics per MD orders. Patient able to express presence and absence of pain and rate pain appropriately using numerical scale. 10/11/2021 1841 by Manuel Oneill RN  Outcome: Ongoing  Note: Pt assessed for pain. Pt in pain and assessed with 0-10 pain rating scale. Pt given prescribed analgesic for pain. (See eMar) Pt satisfied with pain relief thus far. Will reassess and continue to monitor. Goal: Control of acute pain  Description: Control of acute pain  10/11/2021 2330 by Barbi Dominguez RN  Outcome: Ongoing  Note: Pain /discomfort being managed with PRN analgesics per MD orders. Patient able to express presence and absence of pain and rate pain appropriately using numerical scale. 10/11/2021 1841 by Manuel Oneill RN  Outcome: Ongoing  Note: Pt assessed for pain. Pt in pain and assessed with 0-10 pain rating scale. Pt given prescribed analgesic for pain. (See eMar) Pt satisfied with pain relief thus far. Will reassess and continue to monitor. Goal: Control of chronic pain  Description: Control of chronic pain  10/11/2021 2330 by Barbi Dominguez RN  Outcome: Ongoing  Note: Pain /discomfort being managed with PRN analgesics per MD orders.  Patient able to express presence and absence of pain and rate pain appropriately using numerical scale. 10/11/2021 1841 by Mars Elliott RN  Outcome: Ongoing  Note: Pt assessed for pain. Pt in pain and assessed with 0-10 pain rating scale. Pt given prescribed analgesic for pain. (See eMar) Pt satisfied with pain relief thus far. Will reassess and continue to monitor.

## 2021-10-12 NOTE — CARE COORDINATION
[facility]    DC order noted, all docs needed have been faxed to [facility] for home care services.     Home care to see patient within 24-48 hrs    Lottie Beyer RN, BSN CTN  [facility] [phone]

## 2021-10-12 NOTE — PROGRESS NOTES
Data- discharge order received, patient verbalized agreement to discharge, disposition to previous residence, needs noted for HHC/DME and informed Reford Second NP. Action- discharge instructions prepared and given to patient, patient verbalized understanding. Medication information packet given r/t NEW and/or CHANGED prescriptions emphasizing name/purpose/side effects, pt verbalized understanding. Discharge instruction summary: Diet- general, Activity- wbat, Primary Care Physician as followsMurtaza Lane -[phone]. f/u appointment with orthopedic office noted below, immunizations reviewed and discussed with patient, prescription medications to be filled by retail pharmacy and then delivered. Inpatient surgical procedure precautions reviewed: . Neurovascular check performed and patient is WNLs, denies numbness/tingling in extremties. Incision site  prineo glue dressing assessed and is  clean,dry, and intact, no signs of redness, drainage, or odor noted. mepilex border beni distal icision site and is dry and intact with scant spot of old sanguinous drainage noted. patient's bedside RN Dalila Nyhan notified of patient completing discharge instructions and iv removal. Nurse Navigator and Orthopedic Office contact information on discharge instructions and provided to patient. Patient declined RTS or BSC at this time, stated her mother has a RTS. Response- IV removed. Medications to be delivered to patient via meds to bed program. Disposition is home with [facility] (DME delivered to patient by this RN ), to be transported with family.      Future Appointments   Date Time Provider John Garduno   10/26/2021 10:45 AM MD Ra Alva [facility]

## 2021-10-12 NOTE — PROGRESS NOTES
Huddle performed this morning including Nurse navigator Daysi Feldman, Physical therapist qamar, and Occupational therapist judith. Discussed plan of care, discharge plan, and dme needs if applicable for orthopedic total joint patient.   Electronically signed by Maren Latham RN on 10/12/2021 at 8:43 AM

## 2021-10-12 NOTE — PROGRESS NOTES
No falls noted this shift. Patient ambulates with x1 staff assistance without difficulty. Bed kept in low position. Safe environment maintained. Bedside table & call light in reach. Uses call light appropriately when needing assistance.

## 2021-10-12 NOTE — PROGRESS NOTES
Clinical Pharmacy Note  Medication Counseling    Reviewed new medications started during hospital admission: apixaban, oxycodone. Indications and side effects were emphasized during counseling. All medication-related questions addressed. Patient verbalized understanding of education. Should the patient express any additional questions or concerns regarding their medications, please do not hesitate to contact the pharmacy department. Patient/caregiver aware they may refuse medications during hospital stay. 5 minutes spent educating patient regarding medications.

## 2021-10-12 NOTE — PROGRESS NOTES
The patient is sitting up in bed postoperative day #1 status post robotic assisted left total knee arthroplasty. The patient reports mild to moderate pain. She has been able to transfer without much difficulty. On examination today, the patient is alert and oriented x3. The patient is neurovascularly intact distally. The dressing is intact. X-rays: The prosthesis is anatomically aligned. There is no evidence of fracture. Lab Results   Component Value Date    HGB 10.5 (L) 10/12/2021   Acute blood loss anemia - expected after surgery. Will monitor Hgb. The patient will continue to work aggressively on range of motion and strengthening. The patient may weight-bear as tolerated. The patient will follow up with me in approximately 2 weeks and we will reassess her then. She may likely be discharged later this morning.

## 2021-10-12 NOTE — PROGRESS NOTES
[facility] Orthopedic Surgery   Progress Note      S/P :  SUBJECTIVE  Up in chair. Alert and oriented. Pain is   described in left knee and with the intensity of moderate. Pain is described as aching. OBJECTIVE              Physical                      VITALS:  /78   Pulse 103   Temp 98.1 °F (36.7 °C) (Oral)   Resp 15   Ht 5' 7\" (1.702 m)   Wt 292 lb 1.8 oz (132.5 kg)   SpO2 95%   BMI 45.75 kg/m²                     MUSCULOSKELETAL:  left foot NVI. Wiggles toes to command. Pedal pulses are palpable. NEUROLOGIC:                                  Sensory:  Touch:  Left Lower Extremity:  normal                                                 Surgical wound appears clean and dry left anterior knee with prineo dressing. INferior to knee is Mepilex square with tiny red drainage over the port wound. Ice applied. PEYMAN hose on.      Data       CBC:   Lab Results   Component Value Date    WBC 4.3 09/20/2021    RBC 3.99 09/20/2021    HGB 10.5 10/12/2021    HCT 32.0 10/12/2021    MCV 87.6 09/20/2021    MCH 28.7 09/20/2021    MCHC 32.7 09/20/2021    RDW 13.8 09/20/2021     09/20/2021    MPV 8.5 09/20/2021        WBC:    Lab Results   Component Value Date    WBC 4.3 09/20/2021        Hemoglobin/Hematocrit:    Lab Results   Component Value Date    HGB 10.5 10/12/2021    HCT 32.0 10/12/2021        PT/INR:    Lab Results   Component Value Date    PROTIME 12.0 09/20/2021    INR 1.06 09/20/2021              Current Inpatient Medications             Current Facility-Administered Medications: albuterol sulfate  (90 Base) MCG/ACT inhaler 2 puff, 2 puff, Inhalation, Q4H PRN  fluticasone (FLONASE) 50 MCG/ACT nasal spray 2 spray, 2 spray, Nasal, Daily  hydroCHLOROthiazide (HYDRODIURIL) tablet 25 mg, 25 mg, Oral, Daily  lisinopril (PRINIVIL;ZESTRIL) tablet 10 mg, 10 mg, Oral, Daily  cetirizine (ZYRTEC) tablet 5 mg, 5 mg, Oral, Daily  pantoprazole (PROTONIX) tablet 40 mg, 40 mg, Oral, QAM AC  insulin glargine (LANTUS) injection vial 32 Units, 0.25 Units/kg, SubCUTAneous, Nightly  insulin lispro (HUMALOG) injection vial 10 Units, 0.08 Units/kg, SubCUTAneous, TID WC  insulin lispro (HUMALOG) injection vial 0-6 Units, 0-6 Units, SubCUTAneous, TID WC  insulin lispro (HUMALOG) injection vial 0-3 Units, 0-3 Units, SubCUTAneous, Nightly  glucose (GLUTOSE) 40 % oral gel 15 g, 15 g, Oral, PRN  dextrose 50 % IV solution, 12.5 g, IntraVENous, PRN  glucagon (rDNA) injection 1 mg, 1 mg, IntraMUSCular, PRN  dextrose 5 % solution, 100 mL/hr, IntraVENous, PRN  0.9 % sodium chloride infusion, , IntraVENous, Continuous  sodium chloride flush 0.9 % injection 5-40 mL, 5-40 mL, IntraVENous, 2 times per day  sodium chloride flush 0.9 % injection 5-40 mL, 5-40 mL, IntraVENous, PRN  0.9 % sodium chloride infusion, 25 mL, IntraVENous, PRN  acetaminophen (TYLENOL) tablet 650 mg, 650 mg, Oral, Q6H  HYDROmorphone (DILAUDID) injection 0.25 mg, 0.25 mg, IntraVENous, Q3H PRN **OR** HYDROmorphone (DILAUDID) injection 0.5 mg, 0.5 mg, IntraVENous, Q3H PRN  sennosides-docusate sodium (SENOKOT-S) 8.6-50 MG tablet 1 tablet, 1 tablet, Oral, BID  magnesium hydroxide (MILK OF MAGNESIA) 400 MG/5ML suspension 30 mL, 30 mL, Oral, Daily PRN  oxyCODONE (ROXICODONE) immediate release tablet 5 mg, 5 mg, Oral, Q4H PRN **OR** oxyCODONE HCl (OXY-IR) immediate release tablet 10 mg, 10 mg, Oral, Q4H PRN  promethazine (PHENERGAN) tablet 12.5 mg, 12.5 mg, Oral, Q6H PRN **OR** ondansetron (ZOFRAN) injection 4 mg, 4 mg, IntraVENous, Q6H PRN  apixaban (ELIQUIS) tablet 2.5 mg, 2.5 mg, Oral, BID  diclofenac sodium (VOLTAREN) 1 % gel 2 g, 2 g, Topical, 4x Daily PRN    ASSESSMENT AND PLAN      Post left TKA, stable exam  DVT prophylaxis ordered, Eliquis bid for 14 total days after discharge from hospital for DVT prophylaxis, Eliquis bid for 14 total days after discharge from hospital for DVT prophylaxis  PT OT for ADL's and ambulation as tolerated  SS for DC planning, home with home care today  IV or PO pain med as ordered    SHASTA Montes CNP  10/12/2021  8:40 AM

## 2021-10-12 NOTE — PROGRESS NOTES
Medication Reconciliation    List of medications patient is currently taking is complete. Source of information: 1.  Conversation with patient                                      2. EPIC records

## 2021-10-12 NOTE — PROGRESS NOTES
Physical Therapy    Facility/Department: [facility] 3 ORTHOPEDICS  Treatment note    NAME: Dae Ibarra  : 1965  MRN: [id number]    Date of Service: 10/12/2021    Assessment / Discharge Recommendations:  -progressing well and ready for home today  -instructed in initial HEP and general activity to do until Home PT takes charge of care  -[employer] issued rolling walker for home use  -family to assist in/out of car and provide close SBA on steps as needed to enter her Mother's home  -aarom <10 to ~65-70 degrees    Body structures, Functions, Activity limitations: Decreased functional mobility ; Decreased ADL status; Decreased balance;Decreased ROM; Decreased strength  Activity Tolerance  Activity Tolerance: Patient Tolerated treatment well       Patient Diagnosis(es): The primary encounter diagnosis was Primary osteoarthritis of left knee. A diagnosis of Osteoarthritis of left knee, unspecified osteoarthritis type was also pertinent to this visit. has a past medical history of Allergic rhinitis, Arthritis, GERD (gastroesophageal reflux disease), Hypertension, Pre-diabetes, and Wears glasses. has a past surgical history that includes Dental surgery; Total knee arthroplasty (Right, 2019); and Knee Arthroplasty (Left, 10/11/2021). Restrictions  Restrictions/Precautions  Restrictions/Precautions: Weight Bearing, Fall Risk  Position Activity Restriction  Other position/activity restrictions: WBAT  Vision/Hearing  Vision: Within Functional Limits  Hearing: Within functional limits     Subjective  General  Chart Reviewed: Yes  Additional Pertinent Hx: here for elective TKR  Response To Previous Treatment: Patient with no complaints from previous session.   Family / Caregiver Present: Yes  Follows Commands: Within Functional Limits  Subjective  Subjective: to rpom to patient resting in recliner -PCA in room to assist her to the bathroom- agreeable to PT session starting in this context - rating pain to 8/10 with nursing  Pain Screening  Patient Currently in Pain: Yes  Orientation  Orientation  Overall Orientation Status: Within Functional Limits  Social/Functional History  Social/Functional History  Lives With: Daughter  Type of Home: House  Home Layout: Two level  Home Access: Stairs to enter with rails  Entrance Stairs - Number of Steps: 8  Bathroom Shower/Tub: Tub/Shower unit  Bathroom Toilet: Standard  ADL Assistance: Independent  Homemaking Assistance: Independent  Homemaking Responsibilities:  (shares homemaking tasks)  Ambulation Assistance: Independent  Transfer Assistance: Independent  Active : No  Additional Comments: plans discharge to her Mother's home- brother will help and will have shower chair and grab bars there  Cognition   Cognition  Overall Cognitive Status: WFL  Objective  Bed mobility  Supine to Sit: Minimal assistance  Sit to Supine: Unable to assess  Transfers  Sit to Stand: Supervision;Modified independent  Stand to sit: Supervision;Modified independent  Ambulation  Ambulation?: Yes  Ambulation 1  Surface: level tile  Device: Rolling Walker  Assistance: Modified Independent;Supervision  Quality of Gait: step to progressing to step through pattern with good heel contact and good weight bearing  Distance: in room for several passes as needed for household distances  (>50 feet)  Comments: stable on the walker  Stairs/Curb  Stairs?: No (patient declines need to practice as has been taking steps one at a time for many weeks (holding hand rail))  Balance  Comments: steady with transfers and ambulation  Exercises  Quad Sets: 10 per hour  Gluteal Sets: 10 per hour  Ankle Pumps: 30 per hour in easy pace  Comments: encouraged practice with the spirometer at home for a few more days   Plan   Plan  Times per week: 1-4 sessions  Current Treatment Recommendations: Transfer Training, Strengthening, Patient/Caregiver Education & Training, ROM, ADL/Self-care Training, Modalities, Gait Training, Positioning, Stair training  Safety Devices  Type of devices:  All fall risk precautions in place, Call light within reach, Chair alarm in place, Left in chair, Nurse notified (Rafi Dowell)  AM-PAC Score  AM-PAC Inpatient Mobility Raw Score : 15 (10/11/21 1601)  AM-PAC Inpatient T-Scale Score : 39.45 (10/11/21 1601)  Mobility Inpatient CMS 0-100% Score: 57.7 (10/11/21 1601)  Mobility Inpatient CMS G-Code Modifier : CK (10/11/21 1601)  Goals  Short term goals  Time Frame for Short term goals: 1-2 days  Short term goal 1: bed mobility at [address] term goal 2: transfers at supervision  Short term goal 3: ambulation at supervision wbat rolling walker for household distances     -steps as needed for home entry at [address] [facility] rail  Short term goal 4: exercises at supervision  Patient Goals   Patient goals : relief of chronic left knee pain       Therapy Time   Individual Concurrent Group Co-treatment   Time In 1105         Time Out 1130         Minutes 25                 [address], PT

## 2021-10-12 NOTE — PROGRESS NOTES
Pt is  Educated and can demonstrate how to use IS. Placed at bedside and encouraged to utilize every hour while awake.

## 2021-10-13 ENCOUNTER — CARE COORDINATION (OUTPATIENT)
Dept: CASE MANAGEMENT | Age: 56
End: 2021-10-13

## 2021-10-13 DIAGNOSIS — M17.12 PRIMARY OSTEOARTHRITIS OF LEFT KNEE: Primary | ICD-10-CM

## 2021-10-13 NOTE — CARE COORDINATION
[facility] Transitions Initial Follow Up Call    Call within 2 business days of discharge: Yes    Patient: Cleve Charles Patient : 1965   MRN: [id number]  Reason for Admission: L TKR  Discharge Date: 10/12/21 RARS: No data recorded    Last Discharge [facility]       Complaint Diagnosis Description Type Department Provider    10/11/21  Primary osteoarthritis of left knee . .. Admission (Discharged) Alex Ten Stephen Arenas MD           Spoke with: [address]: [facility]    Non-face-to-face services provided:  Obtained and reviewed discharge summary and/or continuity of care documents     Transitions of Care Initial Call    Was this an external facility discharge? No     Challenges to be reviewed by the provider   Additional needs identified to be addressed with provider: No  none             Method of communication with provider : none      Was this a readmission? No  Patient stated reason for admission: L TKR  Patients top risk factors for readmission: medical condition-. and medication management    Care Transition Nurse (CTN) contacted the patient by telephone to perform post hospital discharge assessment. Verified name and  with patient as identifiers. Provided introduction to self, and explanation of the CTN role. CTN reviewed discharge instructions, medical action plan and red flags with patient who verbalized understanding. Patient given an opportunity to ask questions and does not have any further questions or concerns at this time. Were discharge instructions available to patient? Yes. Reviewed appropriate site of care based on symptoms and resources available to patient including: PCP and Specialist. The patient agrees to contact the PCP office for questions related to their healthcare. Medication reconciliation was performed with patient, who verbalizes understanding of administration of home medications.  Advised obtaining a 90-day supply of all daily and as-needed medications. Reviewed and educated patient on any new and changed medications related to discharge diagnosis. CTN provided contact information. Care Transitions 24 Hour Call    Do you have any ongoing symptoms?: Yes  Patient-reported symptoms: Pain, Other  Do you have a copy of your discharge instructions?: Yes  Do you have all of your prescriptions and are they filled?: Yes  Have you been contacted by a [facility]?: No  Have you scheduled your follow up appointment?: No  Were you discharged with any Home Care or Post Acute Services: Yes  Post Acute Services: [facility] [address] you feel like you have everything you need to keep you well at home?: Yes  Care Transitions Interventions         Follow Up  Future Appointments   Date Time Provider John Garduno   10/26/2021 10:45 AM Graciela Calix MD [facility]       Pt reports to be doing better. States [facility] came out today to initiate therapy. States that went well. Pt denies fever, chills, CP, SOB, N/V at this time. States she would rate her pain a 7/10 at this time and confirms that she is taking pain medication. Pt states to nurse that prescription says to take 1-2 tablets for pain but she had to take three at one time yesterday to help. CTN advised pt not to do this but if she feels her pain is out of control to reach out to Dr. Sanjuana Simms office. Pt verbalized understanding and states it is much better today and she as not felt the need to do this again today. Pt using ice and elevation to help with swelling. States her incision is clean, dry and intact with no redness to note. She does have an appointment with Dr. Roberto Ortega 10/26 and states she will make follow up appointment with PCP. No other needs at this time. Will resolve episode and remain available.     Yamileth Mcconnell, NABORN, RN   Care Transition Nurse  Mobile: [phone]

## 2021-10-19 ENCOUNTER — TELEPHONE (OUTPATIENT)
Dept: ORTHOPEDIC SURGERY | Age: 56
End: 2021-10-19

## 2021-10-19 DIAGNOSIS — M17.12 PRIMARY OSTEOARTHRITIS OF LEFT KNEE: ICD-10-CM

## 2021-10-19 RX ORDER — OXYCODONE HYDROCHLORIDE 5 MG/1
5 TABLET ORAL
Qty: 40 TABLET | Refills: 0 | Status: SHIPPED | OUTPATIENT
Start: 2021-10-19 | End: 2021-10-26 | Stop reason: SDUPTHER

## 2021-10-19 NOTE — TELEPHONE ENCOUNTER
Prescription Refill     Medication Name:  PAIN MEDICATION  Pharmacy: [facility]   Patient Contact Number:  [phone]

## 2021-10-20 ENCOUNTER — TELEPHONE (OUTPATIENT)
Dept: ORTHOPEDIC SURGERY | Age: 56
End: 2021-10-20

## 2021-10-20 NOTE — TELEPHONE ENCOUNTER
I called Nyasia Park with [employer] and left message we received a fax from [facility] [employer] [facility] equipment stating they do not take patients insurance for her commode

## 2021-10-21 NOTE — TELEPHONE ENCOUNTER
Richi Irving called and asked for the RX for these items to be faxed to [facility] at [phone]. The patient is currently staying with her Mother at [address]. Everything was faxed to [facility].

## 2021-10-26 ENCOUNTER — OFFICE VISIT (OUTPATIENT)
Dept: ORTHOPEDIC SURGERY | Age: 56
End: 2021-10-26

## 2021-10-26 VITALS — BODY MASS INDEX: 45.83 KG/M2 | WEIGHT: 292 LBS | HEIGHT: 67 IN

## 2021-10-26 DIAGNOSIS — Z96.651 STATUS POST TOTAL RIGHT KNEE REPLACEMENT: Primary | ICD-10-CM

## 2021-10-26 PROCEDURE — 99024 POSTOP FOLLOW-UP VISIT: CPT | Performed by: ORTHOPAEDIC SURGERY

## 2021-10-26 RX ORDER — OXYCODONE HYDROCHLORIDE 5 MG/1
5-10 TABLET ORAL
Qty: 40 TABLET | Refills: 0 | Status: SHIPPED | OUTPATIENT
Start: 2021-10-26 | End: 2021-10-29 | Stop reason: SDUPTHER

## 2021-10-29 ENCOUNTER — TELEPHONE (OUTPATIENT)
Dept: ORTHOPEDIC SURGERY | Age: 56
End: 2021-10-29

## 2021-10-29 DIAGNOSIS — Z96.651 STATUS POST TOTAL RIGHT KNEE REPLACEMENT: ICD-10-CM

## 2021-10-29 RX ORDER — OXYCODONE HYDROCHLORIDE 5 MG/1
5-10 TABLET ORAL EVERY 8 HOURS PRN
Qty: 40 TABLET | Refills: 0 | Status: SHIPPED | OUTPATIENT
Start: 2021-10-29 | End: 2021-11-05 | Stop reason: SDUPTHER

## 2021-10-29 NOTE — TELEPHONE ENCOUNTER
Prescription Refill     Medication Name:  [address]: [address] Nikunj Cheung  Patient Contact Number:  [phone]    PATIENT WOULD LIKE THE PHARMACY SWITCHED TO THIS PHARMACY BECAUSE HER ORIGINAL PHARMACY WILL BE CLOSED TOMORROW.

## 2021-11-05 DIAGNOSIS — Z96.651 STATUS POST TOTAL RIGHT KNEE REPLACEMENT: ICD-10-CM

## 2021-11-05 NOTE — TELEPHONE ENCOUNTER
Prescription Refill     Medication Name:  OXYCODONE   Pharmacy: [facility] [phone]  Patient Contact Number:  [phone]

## 2021-11-05 NOTE — TELEPHONE ENCOUNTER
I informed the patient that Dr. Sayra Lopez is not in the office to refill her medication. She states she has enough to get her to Monday. She was advised this will be refilled Monday. She had a L TKA on 10/11/21.

## 2021-11-08 ENCOUNTER — TELEPHONE (OUTPATIENT)
Dept: ORTHOPEDIC SURGERY | Age: 56
End: 2021-11-08

## 2021-11-08 RX ORDER — OXYCODONE HYDROCHLORIDE 5 MG/1
5 TABLET ORAL EVERY 8 HOURS PRN
Qty: 21 TABLET | Refills: 0 | Status: SHIPPED | OUTPATIENT
Start: 2021-11-08 | End: 2021-11-15 | Stop reason: SDUPTHER

## 2021-11-12 DIAGNOSIS — Z96.651 STATUS POST TOTAL RIGHT KNEE REPLACEMENT: ICD-10-CM

## 2021-11-12 NOTE — TELEPHONE ENCOUNTER
Prescription Refill     Medication Name:  OXYCODONE   Pharmacy: [address], [address]  PHONE: [phone]  Patient Contact Number:  [phone]

## 2021-11-15 ENCOUNTER — HOSPITAL ENCOUNTER (OUTPATIENT)
Dept: PHYSICAL THERAPY | Age: 56
Setting detail: THERAPIES SERIES
Discharge: HOME | End: 2021-11-15
Payer: COMMERCIAL

## 2021-11-15 PROCEDURE — 97016 VASOPNEUMATIC DEVICE THERAPY: CPT | Performed by: PHYSICAL THERAPIST

## 2021-11-15 PROCEDURE — 97110 THERAPEUTIC EXERCISES: CPT | Performed by: PHYSICAL THERAPIST

## 2021-11-15 PROCEDURE — 97164 PT RE-EVAL EST PLAN CARE: CPT | Performed by: PHYSICAL THERAPIST

## 2021-11-15 PROCEDURE — 97530 THERAPEUTIC ACTIVITIES: CPT | Performed by: PHYSICAL THERAPIST

## 2021-11-15 RX ORDER — OXYCODONE HYDROCHLORIDE 5 MG/1
5 TABLET ORAL
Qty: 21 TABLET | Refills: 0 | Status: SHIPPED | OUTPATIENT
Start: 2021-11-15 | End: 2021-11-23 | Stop reason: SDUPTHER

## 2021-11-15 NOTE — PLAN OF CARE
[facility] a Department of [address]  Joann Quintana [place] [address], [address]  Office: [phone]  Fax:  [phone]                                                                  Physical Therapy Treatment Note/ Progress Report:      Physical Therapy Re-Certification Plan of Care    Dear Dr. Pelaez Shown,    We had the pleasure of treating the following patient for physical therapy services at [address] [place] [address]. A summary of our findings can be found in the updated assessment below. This includes our plan of care. If you have any questions or concerns regarding these findings, please do not hesitate to contact me at the office phone number checked above. Thank you for the referral.     Physician Signature:________________________________Date:__________________  By signing above (or electronic signature), therapists plan is approved by physician    Date Range Of Visits: 8/27/21-11/15/2021  Total Visits to Date: 2  Overall Response to Treatment:   [] Patient is responding well to treatment and improvement is noted with regards to goals   [] Patient should continue to improve in reasonable time if they continue HEP   [] Patient has plateaued and is no longer responding to skilled PT intervention    [] Patient is getting worse and would benefit from return to referring MD   [] Patient unable to adhere to initial POC   [x] Other: Pt bhumika tx fair. She presents with decreased ROM, decreased strength, decreased function and increased pain consistent with the pt's diagnosis of s/p left TKA. She has been doing home health and has recently started doing standing hip exercises. She does fatigue quickly and requires multiple breaks due to her fair endurance. We discussed TERT and how to work on getting this t/o the course of the day. Pt voiced understanding.   Pt would benefit from skilled PT to improve strength, ROM, pain, and function. Recommend continuing tx 2x/wk x 8-12 wks. Date:  11/15/2021    Patient Name:  eCsia Jules    :  1965  MRN: [id number]  Restrictions/Precautions:    Medical/Treatment Diagnosis Information:  · Diagnosis: M17.12 (ICD-10-CM) - Primary osteoarthritis of left knee; Z96.651 (ICD-10-CM) - Status post total right knee replacement. Surgery on 10/11/21  · Treatment Diagnosis: M25.562 - left knee pain  Insurance/Certification information:  PT Insurance Information: R  Physician Information:  Referring Practitioner: Boris Ray  Has the plan of care been signed (Y/N):        []  Yes  [x]  No     Date of Patient follow up with Physician:       Is this a Progress Report:     []  Yes  [x]  No        If Yes:  Date Range for reporting period:  Beginning 21  Ending    Progress report will be due (10 Rx or 30 days whichever is less): visit 12 or 12 Dec 21           Visit # Insurance Allowable Auth Required   2 (+ approx 8 visits elsewhere) 30 []  Yes [x]  No        Functional Scale: WOMAC-89.6%   Date assessed: 11/15/2021    Latex Allergy:  [x]NO      []YES  Preferred Language for Healthcare:   [x]English       []other:      Pain level:  6-7/10    SUBJECTIVE:  She is sleeping okay sometimes. She is trying to keep her leg straight. She is living at home with her daughter. She can get in/out. She doesn't go anywhere. She did have therapy at home at her mother's house. She did work on steps at home. She was told to start using her cane. She hasn't felt comfortable with this entirely. OBJECTIVE: 11/15/21   Observation: Steristrips removed. Pt has dry peeling skin. She has no S&S of infection.      Test measurements:      Flexibility L R Comment   Hamstring      Gastroc      ITB      Quad                ROM PROM AROM Overpressure Comment    L R L R L R    Flexion 83 sheet pulls         Extension   lacking 5 Strength L R Comment   Quad      Hamstring      Gastroc      Hip flexor      Hip ABD                      Special Test Results/Comment   Meniscal Click    Crepitus    Flexion Test    Valgus Laxity    Varus Laxity    Lachmans    Drop Back    Homans            Girth L R   Mid Patella     Suprapatellar     5cm above     15cm above       Kalyan Clinical Decision Rule for DVT    Clinical Presentation  Possible Score  Clients Score    Active cancer (within 6 months of diagnosis or receiving palliative care)  1  0   Paralysis, paresis, or recent immobilization of lower extremity  1  0   Bedridden for more than 3 days or major surgery in the last 4 weeks  1  0   Localized tenderness in the center of the posterior calf, the popliteal space, or along the femoral vein in the anterior thigh/groin  1  0   Entire lower extremity swelling  1  0   Unilateral calf swelling (more than 3 cm larger than uninvolved side)  1  0   Unilateral pitting edema  1  0   Collateral superficial veins (nonvaricose)  1  0   An alternative diagnosis is as likely (or more likely) than DVT (e.g., cellulitis, postoperative swelling, calf strain)  -2  -2   Total Points   -2     Key  · -2 to 0 Low probability of DVT 3% (95% confidence interval [CI] 1.7%-5.9%)  · 1 to 2 Moderate probability of DVT 17% (95% confidence interval [CI] 12%-23%)  · 3 or more High probability of DVT 75% (95% confidence interval [CI] 63%-84%)    Medical consultation is advised in the presence of low probability  Medical referral is required with moderate or high score. Kalyan PS, Richi DR, Martínez J, et al: Value of assessment of pretest probability of deep-vein thrombosis in clinical management, Lancet 826:7485-6649, 1997. RESTRICTIONS/PRECAUTIONS: HTN managed with medications; OA; powder gloves causes itchiness and breaking out.  R TKA in 2018.      Exercises/Interventions:     Exercise/Equipment Resistance/Repititions Other comments   Stretching Hamstring 3x:30    Hip Flexion     ITB- Rope     Grion     Quad     Inclined Calf     Towel Pull 3x:30    Piriformis                    SLR     Supine 2x5 Quad lag   Prone     Abduction     Adducton     SLR+          Clams hooklying x10 green              Isometrics     Quad sets 10x:10    Ball Squeezes 10x:10    Patellar Glides     Medial     Superior     Inferior          ROM     Passive     Active     Weight Shift     Hang Weights     Sheet Pulls 5x:10    Ankle Pumps HEP         CKC     Calf raises     Wall sits     Step ups     1 leg stand     Squatting     CC TKE     Balance     Monster Walks     Bridging     Triple threats     Stool Scoots     PRE     Extension  RANGE:   Flexion  RANGE:        Cable Column          Leg Press  RANGE:        Bike     Treadmill            Access Code: [email or web address]  URL: [email or web address]  Date: 11/15/2021  Prepared by: Renae Walker    Exercises  Long Sitting Calf Stretch with Strap - 2 x daily - 7 x weekly - 3-5 sets - 30 hold  Seated Table Hamstring Stretch - 2 x daily - 7 x weekly - 3-5 sets - 30 hold  Supine Quadricep Sets - 2-10 x daily - 7 x weekly - 5-10 reps - 5-10 hold  Supine Ankle Pumps - 10 x daily - 7 x weekly - 3 sets - 10 reps  Supine Hip Adduction Isometric with Ball - 2 x daily - 7 x weekly - 10 reps - 5-10 hold  Supine Active Straight Leg Raise - 1-2 x daily - 7 x weekly - 1-3 sets - 5-10 reps  Hooklying Clamshell with Resistance - 2 x daily - 7 x weekly - 1-3 sets - 10 reps  Supine Heel Slide with Strap - 2 x daily - 7 x weekly - 5-10 sets - 5-10 hold             Therapeutic Exercise and NMR EXR  [x]  (19655) Provided verbal/tactile cueing for activities related to strengthening, flexibility, endurance, ROM for improvements in LE, proximal hip, and core control with self care, mobility, lifting, ambulation.   [x]  (27381) Provided verbal/tactile cueing for activities related to improving balance, coordination, kinesthetic sense, posture, motor skill, proprioception  to assist with LE, proximal hip, and core control in self care, mobility, lifting, ambulation and eccentric single leg control. NMR and Therapeutic Activities:    [x]  (20487 or 13505) Provided verbal/tactile cueing for activities related to improving balance, coordination, kinesthetic sense, posture, motor skill, proprioception and motor activation to allow for proper function of core, proximal hip and LE with self care and ADLs  [x]  (97066) Gait Re-education- Provided training and instruction to the patient for proper LE, core and proximal hip recruitment and positioning and eccentric body weight control with ambulation re-education including up and down stairs     Home Exercise Program:    [x]  (95283) Reviewed/Progressed HEP activities related to strengthening, flexibility, endurance, ROM of core, proximal hip and LE for functional self-care, mobility, lifting and ambulation/stair navigation   [x]  (92127)Reviewed/Progressed HEP activities related to improving balance, coordination, kinesthetic sense, posture, motor skill, proprioception of core, proximal hip and LE for self care, mobility, lifting, and ambulation/stair navigation      Manual Treatments:  PROM / STM / Oscillations-Mobs:  G-I, II, III, IV (PA's, Inf., Post.)  [x]  (33057) Provided manual therapy to mobilize LE, proximal hip and/or LS spine soft tissue/joints for the purpose of modulating pain, promoting relaxation,  increasing ROM, reducing/eliminating soft tissue swelling/inflammation/restriction, improving soft tissue extensibility and allowing for proper ROM for normal function with self care, mobility, lifting and ambulation.      Modalities: Low pressure vaso (last 1' temp warmed up) 15'    Charges:  Timed Code Treatment Minutes: 23'   Total Treatment Minutes: 60     []  EVAL (LOW) 12883   []  EVAL (MOD) 95263   []  EVAL (HIGH) 54871   [x]  RE-EVAL   [x]  KN(64268) x1    []  IONTO  []  NMR (62255) x     [x] VASO  []  Manual (93915) x      []  Other:  [x]  TA x 1     []  Mech Traction (25183)  []  ES(attended) (94174)      []  ES (un) (37221):       GOALS:   Patient stated goal: decrease pain    []? ? Progressing: []?? Met: []?? Not Met: []?? Adjusted     Therapist goals for Patient:   Short Term Goals: To be achieved in: 2 weeks  1. Independent in HEP and progression per patient tolerance, in order to prevent re-injury.     []? ? Progressing: []?? Met: []?? Not Met: []?? Adjusted   2. Pt will report pain at worst less than or equal to 8/10.  []?? Progressing: []?? Met: []?? Not Met: []?? Adjusted     Long Term Goals: To be achieved in: 16 weeks  1. Pt will demo a score 40% or better for the WOMAC to assist with reaching prior level of function.   []?? Progressing: []?? Met: []?? Not Met: []?? Adjusted  2. Patient will demonstrate increased AROM to knee ext to equal to 0 and knee flex greater than or equal to 110 to allow for proper joint functioning as indicated by patients Functional Deficits.    []? ? Progressing: []?? Met: []?? Not Met: []?? Adjusted  3. Patient will demonstrate an increase in strength to hip flex, hip ABD, and knee flex and ext strength 4 to allow for proper functional mobility as indicated by patients functional deficits. []?? Progressing: []?? Met: []?? Not Met: []?? Adjusted  4. Patient will return to performing all transfers with mod I with pain less than or equal to 3/10 in order to be independent in home mobility.  []?? Progressing: []?? Met: []?? Not Met: []?? Adjusted  5. Pt will report pain at worst less than or equal to 5/10.         []? ? Progressing: []?? Met: []?? Not Met: []?? Adjusted       Overall Progression Towards Functional goals/ Treatment Progress Update:  [] Patient is progressing as expected towards functional goals listed. [] Progression is slowed due to complexities/Impairments listed. [] Progression has been slowed due to co-morbidities.   [x] Plan just implemented, too soon to assess goals progression <30days   [] Goals require adjustment due to lack of progress  [] Patient is not progressing as expected and requires additional follow up with physician  [] Other    Prognosis for POC: [x] Good [] Fair  [] Poor      Patient requires continued skilled intervention: [x] Yes  [] No    Treatment/Activity Tolerance:  [x] Patient able to complete treatment  [x] Patient limited by fatigue  [x] Patient limited by pain     [] Patient limited by other medical complications  [] Other: Pt bhumika tx fair. She presents with decreased ROM, decreased strength, decreased function and increased pain consistent with the pt's diagnosis of s/p left TKA. She has been doing home health and has recently started doing standing hip exercises. She does fatigue quickly and requires multiple breaks due to her fair endurance. We discussed TERT and how to work on getting this t/o the course of the day. Pt voiced understanding. Pt would benefit from skilled PT to improve strength, ROM, pain, and function. Recommend continuing tx 2x/wk x 8-12 wks. PLAN: If pt doesn't return, this note can be considered a D/C note. Consider bike for ROM.   [] Continue per plan of care [] Alter current plan (see comments above)  [x] Plan of care initiated [] Hold pending MD visit [] Discharge  Electronically signed by: Matthew Bolton DPT [id number]

## 2021-11-17 ENCOUNTER — HOSPITAL ENCOUNTER (OUTPATIENT)
Dept: PHYSICAL THERAPY | Age: 56
Setting detail: THERAPIES SERIES
Discharge: HOME | End: 2021-11-17
Payer: COMMERCIAL

## 2021-11-17 PROCEDURE — 97110 THERAPEUTIC EXERCISES: CPT | Performed by: PHYSICAL THERAPIST

## 2021-11-17 PROCEDURE — 97530 THERAPEUTIC ACTIVITIES: CPT | Performed by: PHYSICAL THERAPIST

## 2021-11-17 PROCEDURE — 97016 VASOPNEUMATIC DEVICE THERAPY: CPT | Performed by: PHYSICAL THERAPIST

## 2021-11-17 PROCEDURE — 97140 MANUAL THERAPY 1/> REGIONS: CPT | Performed by: PHYSICAL THERAPIST

## 2021-11-17 NOTE — FLOWSHEET NOTE
[facility] a Department of [address]  [facility] [address], [address]  Office: [phone]  Fax:  [phone]                                                                  Physical Therapy Treatment Note/ Progress Report:                   Date:  2021    Patient Name:  Louis Enriquez    :  1965  MRN: [id number]  Restrictions/Precautions:    Medical/Treatment Diagnosis Information:  · Diagnosis: M17.12 (ICD-10-CM) - Primary osteoarthritis of left knee; Z96.651 (ICD-10-CM) - Status post total right knee replacement. Surgery on 10/11/21  · Treatment Diagnosis: M25.562 - left knee pain  Insurance/Certification information:  PT Insurance Information: UMR  Physician Information:  Referring Practitioner: Jefferson Michelle  Has the plan of care been signed (Y/N):        []  Yes  [x]  No     Date of Patient follow up with Physician:       Is this a Progress Report:     []  Yes  [x]  No        If Yes:  Date Range for reporting period:  Beginning 21  Ending    Progress report will be due (10 Rx or 30 days whichever is less): visit 12 or 12 Dec 21           Visit # Insurance Allowable Auth Required   3 (+ approx 8 visits elsewhere) 30 []  Yes [x]  No        Functional Scale: WOMAC-89.6%   Date assessed: 11/15/2021    Latex Allergy:  [x]NO      []YES  Preferred Language for Healthcare:   [x]English       []other:      Pain level:  6-7/10    SUBJECTIVE:  She took some pain pills before she came today. She has been doing her HEP. She had some soreness on the top of her leg (distal thigh). OBJECTIVE: 21   Observation: Incision is healing well. She has no S&S of infection.      Test measurements:      Flexibility L R Comment   Hamstring      Gastroc      ITB      Quad                ROM PROM AROM Overpressure Comment    L R L R L R    Flexion 85 sheet pulls Extension   lacking 5                               Strength L R Comment   Quad      Hamstring      Gastroc      Hip flexor      Hip ABD                      Special Test Results/Comment   Meniscal Click    Crepitus    Flexion Test    Valgus Laxity    Varus Laxity    Lachmans    Drop Back    Homans            Girth L R   Mid Patella     Suprapatellar     5cm above     15cm above       Kalyan Clinical Decision Rule for DVT    Clinical Presentation  Possible Score  Clients Score    Active cancer (within 6 months of diagnosis or receiving palliative care)  1  0   Paralysis, paresis, or recent immobilization of lower extremity  1  0   Bedridden for more than 3 days or major surgery in the last 4 weeks  1  0   Localized tenderness in the center of the posterior calf, the popliteal space, or along the femoral vein in the anterior thigh/groin  1  0   Entire lower extremity swelling  1  0   Unilateral calf swelling (more than 3 cm larger than uninvolved side)  1  0   Unilateral pitting edema  1  0   Collateral superficial veins (nonvaricose)  1  0   An alternative diagnosis is as likely (or more likely) than DVT (e.g., cellulitis, postoperative swelling, calf strain)  -2  -2   Total Points   -2     Key  · -2 to 0 Low probability of DVT 3% (95% confidence interval [CI] 1.7%-5.9%)  · 1 to 2 Moderate probability of DVT 17% (95% confidence interval [CI] 12%-23%)  · 3 or more High probability of DVT 75% (95% confidence interval [CI] 63%-84%)    Medical consultation is advised in the presence of low probability  Medical referral is required with moderate or high score. Kalyan PS, Richi DR, Martínez J, et al: Value of assessment of pretest probability of deep-vein thrombosis in clinical management, Lancet 130:3762-3786, 1997. RESTRICTIONS/PRECAUTIONS: HTN managed with medications; OA; powder gloves causes itchiness and breaking out.  R TKA in 2018.      Exercises/Interventions:     Exercise/Equipment Resistance/Repititions Other comments   Stretching     Hamstring 5x:30    Hip Flexion     ITB- Rope     Grion     Quad     Inclined Calf     Towel Pull 4x:30    Piriformis                    SLR     Supine 2x5 Quad lag   Prone     Abduction     Adducton     SLR+          Clams hooklying x10 green              Isometrics     Quad sets 10x:10    Ball Squeezes 10x:10    Patellar Glides 8' with scar massage focusing on superior scar    Medial 2'    Superior 2'    Inferior 2'         ROM     Passive     Active     Weight Shift     Hang Weights     Sheet Pulls 5x:10    Ankle Pumps HEP         CKC     Calf raises     Wall sits     Step ups     1 leg stand     Squatting     CC TKE     Balance     Monster Walks     Bridging     Triple threats     Stool Scoots     PRE     Extension  RANGE:   Flexion  RANGE:        Cable Column          Leg Press  RANGE:        Bike 5' ROM    Treadmill            Access Code: [email or web address]  URL: [email or web address]  Date: 11/15/2021  Prepared by: Houston Walker    Exercises  Long Sitting Calf Stretch with Strap - 2 x daily - 7 x weekly - 3-5 sets - 30 hold  Seated Table Hamstring Stretch - 2 x daily - 7 x weekly - 3-5 sets - 30 hold  Supine Quadricep Sets - 2-10 x daily - 7 x weekly - 5-10 reps - 5-10 hold  Supine Ankle Pumps - 10 x daily - 7 x weekly - 3 sets - 10 reps  Supine Hip Adduction Isometric with Ball - 2 x daily - 7 x weekly - 10 reps - 5-10 hold  Supine Active Straight Leg Raise - 1-2 x daily - 7 x weekly - 1-3 sets - 5-10 reps  Hooklying Clamshell with Resistance - 2 x daily - 7 x weekly - 1-3 sets - 10 reps  Supine Heel Slide with Strap - 2 x daily - 7 x weekly - 5-10 sets - 5-10 hold             Therapeutic Exercise and NMR EXR  [x]  (05743) Provided verbal/tactile cueing for activities related to strengthening, flexibility, endurance, ROM for improvements in LE, proximal hip, and core control with self care, mobility, lifting, ambulation.   [x]  (21790) Provided verbal/tactile cueing for activities related to improving balance, coordination, kinesthetic sense, posture, motor skill, proprioception  to assist with LE, proximal hip, and core control in self care, mobility, lifting, ambulation and eccentric single leg control. NMR and Therapeutic Activities:    [x]  (18532 or 82646) Provided verbal/tactile cueing for activities related to improving balance, coordination, kinesthetic sense, posture, motor skill, proprioception and motor activation to allow for proper function of core, proximal hip and LE with self care and ADLs  [x]  (66596) Gait Re-education- Provided training and instruction to the patient for proper LE, core and proximal hip recruitment and positioning and eccentric body weight control with ambulation re-education including up and down stairs     Home Exercise Program:    [x]  (10316) Reviewed/Progressed HEP activities related to strengthening, flexibility, endurance, ROM of core, proximal hip and LE for functional self-care, mobility, lifting and ambulation/stair navigation   [x]  (45560)Reviewed/Progressed HEP activities related to improving balance, coordination, kinesthetic sense, posture, motor skill, proprioception of core, proximal hip and LE for self care, mobility, lifting, and ambulation/stair navigation      Manual Treatments:  PROM / STM / Oscillations-Mobs:  G-I, II, III, IV (PA's, Inf., Post.)  [x]  (41059) Provided manual therapy to mobilize LE, proximal hip and/or LS spine soft tissue/joints for the purpose of modulating pain, promoting relaxation,  increasing ROM, reducing/eliminating soft tissue swelling/inflammation/restriction, improving soft tissue extensibility and allowing for proper ROM for normal function with self care, mobility, lifting and ambulation.      Modalities: Medium pressure vaso 15'    Charges:   Timed Code Treatment Minutes: 37'   Total Treatment Minutes: 79'     []  EVAL (LOW) 455 1011   []  EVAL (MOD) 91475   []  EVAL (HIGH) 85210   []  RE-EVAL   [x]  WC(99799) x1    []  IONTO  []  NMR (35300) x     [x]  VASO  [x]  Manual (89652) x 1     []  Other:  [x]  TA x 1     []  Mech Traction (43602)  []  ES(attended) (72056)      []  ES (un) (99987):       GOALS:   Patient stated goal: decrease pain    []? ? Progressing: []?? Met: []?? Not Met: []?? Adjusted     Therapist goals for Patient:   Short Term Goals: To be achieved in: 2 weeks  1. Independent in HEP and progression per patient tolerance, in order to prevent re-injury.     []? ? Progressing: []?? Met: []?? Not Met: []?? Adjusted   2. Pt will report pain at worst less than or equal to 8/10.  []?? Progressing: []?? Met: []?? Not Met: []?? Adjusted     Long Term Goals: To be achieved in: 16 weeks  1. Pt will demo a score 40% or better for the WOMAC to assist with reaching prior level of function.   []?? Progressing: []?? Met: []?? Not Met: []?? Adjusted  2. Patient will demonstrate increased AROM to knee ext to equal to 0 and knee flex greater than or equal to 110 to allow for proper joint functioning as indicated by patients Functional Deficits.    []? ? Progressing: []?? Met: []?? Not Met: []?? Adjusted  3. Patient will demonstrate an increase in strength to hip flex, hip ABD, and knee flex and ext strength 4 to allow for proper functional mobility as indicated by patients functional deficits. []?? Progressing: []?? Met: []?? Not Met: []?? Adjusted  4. Patient will return to performing all transfers with mod I with pain less than or equal to 3/10 in order to be independent in home mobility.  []?? Progressing: []?? Met: []?? Not Met: []?? Adjusted  5. Pt will report pain at worst less than or equal to 5/10.         []? ? Progressing: []?? Met: []?? Not Met: []?? Adjusted       Overall Progression Towards Functional goals/ Treatment Progress Update:  [] Patient is progressing as expected towards functional goals listed.     [] Progression is slowed due to complexities/Impairments

## 2021-11-22 ENCOUNTER — HOSPITAL ENCOUNTER (OUTPATIENT)
Dept: PHYSICAL THERAPY | Age: 56
Setting detail: THERAPIES SERIES
Discharge: HOME | End: 2021-11-22
Payer: COMMERCIAL

## 2021-11-22 DIAGNOSIS — Z96.651 STATUS POST TOTAL RIGHT KNEE REPLACEMENT: ICD-10-CM

## 2021-11-22 PROCEDURE — 97110 THERAPEUTIC EXERCISES: CPT | Performed by: PHYSICAL THERAPIST

## 2021-11-22 PROCEDURE — 97530 THERAPEUTIC ACTIVITIES: CPT | Performed by: PHYSICAL THERAPIST

## 2021-11-22 PROCEDURE — 97140 MANUAL THERAPY 1/> REGIONS: CPT | Performed by: PHYSICAL THERAPIST

## 2021-11-22 RX ORDER — OXYCODONE HYDROCHLORIDE 5 MG/1
5 TABLET ORAL EVERY 12 HOURS PRN
Qty: 14 TABLET | Refills: 0 | Status: CANCELLED | OUTPATIENT
Start: 2021-11-22 | End: 2021-11-29

## 2021-11-22 NOTE — FLOWSHEET NOTE
[address] [facility] Performance and Rehabilitation a Department of [address]  [facility] [place] [address], [address]  Office: [phone]  Fax:  [phone]                                                                  Physical Therapy Treatment Note/ Progress Report:                   Date:  2021    Patient Name:  Addy Castañeda    :  1965  MRN: [id number]  Restrictions/Precautions:    Medical/Treatment Diagnosis Information:  · Diagnosis: M17.12 (ICD-10-CM) - Primary osteoarthritis of left knee; Z96.651 (ICD-10-CM) - Status post total right knee replacement. Surgery on 10/11/21  · Treatment Diagnosis: M25.562 - left knee pain  Insurance/Certification information:  PT Insurance Information: R  Physician Information:  Referring Practitioner: Roberto Ortgea  Has the plan of care been signed (Y/N):        []  Yes  [x]  No     Date of Patient follow up with Physician:       Is this a Progress Report:     []  Yes  [x]  No        If Yes:  Date Range for reporting period:  Beginning 21  Ending    Progress report will be due (10 Rx or 30 days whichever is less): visit 12 or 12 Dec 21           Visit # Insurance Allowable Auth Required   4 (+ approx 8 visits elsewhere) 30 []  Yes [x]  No        Functional Scale: WOMAC-89.6%   Date assessed: 11/15/2021    Latex Allergy:  [x]NO      []YES  Preferred Language for Healthcare:   [x]English       []other:      Pain level:  6-7/10    SUBJECTIVE:  She took some pain pills before she came. She has continued to ice. She is working on her HEP. OBJECTIVE: 21    Observation: Incision is healing well. She has no S&S of infection.      Test measurements:      Flexibility L R Comment   Hamstring      Gastroc      ITB      Quad                ROM PROM AROM Overpressure Comment    L R L R L R    Flexion 95 sheet pulls         Extension   lacking 4 Strength L R Comment   Quad      Hamstring      Gastroc      Hip flexor      Hip ABD                      Special Test Results/Comment   Meniscal Click    Crepitus    Flexion Test    Valgus Laxity    Varus Laxity    Lachmans    Drop Back    Homans            Girth L R   Mid Patella     Suprapatellar     5cm above     15cm above       Kalyan Clinical Decision Rule for DVT    Clinical Presentation  Possible Score  Clients Score    Active cancer (within 6 months of diagnosis or receiving palliative care)  1  0   Paralysis, paresis, or recent immobilization of lower extremity  1  0   Bedridden for more than 3 days or major surgery in the last 4 weeks  1  0   Localized tenderness in the center of the posterior calf, the popliteal space, or along the femoral vein in the anterior thigh/groin  1  0   Entire lower extremity swelling  1  0   Unilateral calf swelling (more than 3 cm larger than uninvolved side)  1  0   Unilateral pitting edema  1  0   Collateral superficial veins (nonvaricose)  1  0   An alternative diagnosis is as likely (or more likely) than DVT (e.g., cellulitis, postoperative swelling, calf strain)  -2  -2   Total Points   -2     Key  · -2 to 0 Low probability of DVT 3% (95% confidence interval [CI] 1.7%-5.9%)  · 1 to 2 Moderate probability of DVT 17% (95% confidence interval [CI] 12%-23%)  · 3 or more High probability of DVT 75% (95% confidence interval [CI] 63%-84%)    Medical consultation is advised in the presence of low probability  Medical referral is required with moderate or high score. Kalyan PS, Richi DR, Martínez J, et al: Value of assessment of pretest probability of deep-vein thrombosis in clinical management, Lancet 404:7310-7505, 1997. RESTRICTIONS/PRECAUTIONS: HTN managed with medications; OA; powder gloves causes itchiness and breaking out.  R TKA in 2018.      Exercises/Interventions:     Exercise/Equipment Resistance/Repititions Other comments   Stretching Hamstring 5x:30    Hip Flexion     ITB- Rope     Grion     Quad     Inclined Calf 5x:30    Towel Pull     Piriformis                    SLR     Supine 2x5 Quad lag   Prone     Abduction     Adducton     SLR+          Clams hooklying 2x7 green              Isometrics     Quad sets 10x:10    Ball Squeezes 10x:10    Patellar Glides 8' with scar massage focusing on superior scar    Medial 2'    Superior 2'    Inferior 2'         ROM     Passive     Active     Weight Shift     Hang Weights     Sheet Pulls 5x:10    Ankle Pumps HEP         CKC     Calf raises 2x7    Wall sits     Step ups     1 leg stand     Squatting     CC TKE     Balance     Monster Walks     Bridging     Triple threats     Stool Scoots     PRE     Extension  RANGE:   Flexion  RANGE:        Cable Column          Leg Press  RANGE:        Bike 7' ROM    Treadmill            Access Code: [email or web address]  URL: [email or web address]  Date: 11/15/2021  Prepared by: Burke Walker    Exercises  Long Sitting Calf Stretch with Strap - 2 x daily - 7 x weekly - 3-5 sets - 30 hold  Seated Table Hamstring Stretch - 2 x daily - 7 x weekly - 3-5 sets - 30 hold  Supine Quadricep Sets - 2-10 x daily - 7 x weekly - 5-10 reps - 5-10 hold  Supine Ankle Pumps - 10 x daily - 7 x weekly - 3 sets - 10 reps  Supine Hip Adduction Isometric with Ball - 2 x daily - 7 x weekly - 10 reps - 5-10 hold  Supine Active Straight Leg Raise - 1-2 x daily - 7 x weekly - 1-3 sets - 5-10 reps  Hooklying Clamshell with Resistance - 2 x daily - 7 x weekly - 1-3 sets - 10 reps  Supine Heel Slide with Strap - 2 x daily - 7 x weekly - 5-10 sets - 5-10 hold             Therapeutic Exercise and NMR EXR  [x]  (51957) Provided verbal/tactile cueing for activities related to strengthening, flexibility, endurance, ROM for improvements in LE, proximal hip, and core control with self care, mobility, lifting, ambulation.   [x]  (68211) Provided verbal/tactile cueing for activities related to improving balance, coordination, kinesthetic sense, posture, motor skill, proprioception  to assist with LE, proximal hip, and core control in self care, mobility, lifting, ambulation and eccentric single leg control. NMR and Therapeutic Activities:    [x]  (15961 or 69965) Provided verbal/tactile cueing for activities related to improving balance, coordination, kinesthetic sense, posture, motor skill, proprioception and motor activation to allow for proper function of core, proximal hip and LE with self care and ADLs  [x]  (38868) Gait Re-education- Provided training and instruction to the patient for proper LE, core and proximal hip recruitment and positioning and eccentric body weight control with ambulation re-education including up and down stairs     Home Exercise Program:    [x]  (20051) Reviewed/Progressed HEP activities related to strengthening, flexibility, endurance, ROM of core, proximal hip and LE for functional self-care, mobility, lifting and ambulation/stair navigation   [x]  (77716)Reviewed/Progressed HEP activities related to improving balance, coordination, kinesthetic sense, posture, motor skill, proprioception of core, proximal hip and LE for self care, mobility, lifting, and ambulation/stair navigation      Manual Treatments:  PROM / STM / Oscillations-Mobs:  G-I, II, III, IV (PA's, Inf., Post.)  [x]  (71144) Provided manual therapy to mobilize LE, proximal hip and/or LS spine soft tissue/joints for the purpose of modulating pain, promoting relaxation,  increasing ROM, reducing/eliminating soft tissue swelling/inflammation/restriction, improving soft tissue extensibility and allowing for proper ROM for normal function with self care, mobility, lifting and ambulation.      Modalities: ice 10'    Charges:   Timed Code Treatment Minutes: 39'   Total Treatment Minutes: 79'     []  EVAL (LOW) 20409   []  EVAL (MOD) 40180   []  EVAL (HIGH) 45050   []  RE-EVAL   [x]  BJ(01897) x1    []  IONTO  [] NMR (63415) x     []  VASO  [x]  Manual (79231) x 1     []  Other:  [x]  TA x 1     []  Mech Traction (13861)  []  ES(attended) (87761)      []  ES (un) (30752):       GOALS:   Patient stated goal: decrease pain    []? ? Progressing: []?? Met: []?? Not Met: []?? Adjusted     Therapist goals for Patient:   Short Term Goals: To be achieved in: 2 weeks  1. Independent in HEP and progression per patient tolerance, in order to prevent re-injury.     []? ? Progressing: []?? Met: []?? Not Met: []?? Adjusted   2. Pt will report pain at worst less than or equal to 8/10.  []?? Progressing: []?? Met: []?? Not Met: []?? Adjusted     Long Term Goals: To be achieved in: 16 weeks  1. Pt will demo a score 40% or better for the WOMAC to assist with reaching prior level of function.   []?? Progressing: []?? Met: []?? Not Met: []?? Adjusted  2. Patient will demonstrate increased AROM to knee ext to equal to 0 and knee flex greater than or equal to 110 to allow for proper joint functioning as indicated by patients Functional Deficits.    []? ? Progressing: []?? Met: []?? Not Met: []?? Adjusted  3. Patient will demonstrate an increase in strength to hip flex, hip ABD, and knee flex and ext strength 4 to allow for proper functional mobility as indicated by patients functional deficits. []?? Progressing: []?? Met: []?? Not Met: []?? Adjusted  4. Patient will return to performing all transfers with mod I with pain less than or equal to 3/10 in order to be independent in home mobility.  []?? Progressing: []?? Met: []?? Not Met: []?? Adjusted  5. Pt will report pain at worst less than or equal to 5/10.         []? ? Progressing: []?? Met: []?? Not Met: []?? Adjusted       Overall Progression Towards Functional goals/ Treatment Progress Update:  [] Patient is progressing as expected towards functional goals listed. [] Progression is slowed due to complexities/Impairments listed. [] Progression has been slowed due to co-morbidities.   [x] Plan just implemented, too soon to assess goals progression <30days   [] Goals require adjustment due to lack of progress  [] Patient is not progressing as expected and requires additional follow up with physician  [] Other    Prognosis for POC: [x] Good [] Fair  [] Poor      Patient requires continued skilled intervention: [x] Yes  [] No    Treatment/Activity Tolerance:  [x] Patient able to complete treatment  [x] Patient limited by fatigue  [x] Patient limited by pain     [] Patient limited by other medical complications  [] Other: Pt bhumika tx fair. She demo improved ROM. She did do her quad sets and part of her manual tx with her heel propped. This did cause tightness in the back of her knee. She has done her HEP at home, but she does need cuing for proper form and reps/holds. She demo fair quad contraction. Her endurance is fair. Pt would benefit from skilled PT to improve strength, ROM, pain, and function. PLAN: If pt doesn't return, this note can be considered a D/C note. Consider increasing time on bike and sit to stands. Consider EOB ROM.    [x] Continue per plan of care [] Alter current plan (see comments above)  [] Plan of care initiated [] Hold pending MD visit [] Discharge  Electronically signed by: Azra Borjas DPT [id number]

## 2021-11-23 ENCOUNTER — OFFICE VISIT (OUTPATIENT)
Dept: ORTHOPEDIC SURGERY | Age: 56
End: 2021-11-23

## 2021-11-23 ENCOUNTER — TELEPHONE (OUTPATIENT)
Dept: ORTHOPEDIC SURGERY | Age: 56
End: 2021-11-23

## 2021-11-23 VITALS — RESPIRATION RATE: 15 BRPM | BODY MASS INDEX: 45.83 KG/M2 | WEIGHT: 292 LBS | HEIGHT: 67 IN

## 2021-11-23 DIAGNOSIS — Z96.651 STATUS POST TOTAL RIGHT KNEE REPLACEMENT: ICD-10-CM

## 2021-11-23 PROCEDURE — 99024 POSTOP FOLLOW-UP VISIT: CPT | Performed by: ORTHOPAEDIC SURGERY

## 2021-11-23 RX ORDER — OXYCODONE HYDROCHLORIDE 5 MG/1
5 TABLET ORAL EVERY 8 HOURS PRN
Qty: 30 TABLET | Refills: 0 | Status: SHIPPED | OUTPATIENT
Start: 2021-11-23 | End: 2021-12-03 | Stop reason: SDUPTHER

## 2021-11-23 NOTE — PROGRESS NOTES
Annie Lopez  [id number]  November 23, 2021    Chief Complaint   Patient presents with    Post-Op Check     left TKA 10/11/21             History: The patient is now approximately 6 weeks status post left total knee arthroplasty. The patient rates her pain as 6/10. She denies any numbness or tingling. She reports no issues with her anesthesia. She is transitioning to outpatient therapy. She occasionally ambulates with a cane. The patient's  past medical history, medications, allergies,  family history, social history, and review of systems have been reviewed, and dated and are recorded in the chart. Resp 15   Ht 5' 7\" (1.702 m)   Wt 292 lb (132.5 kg)   BMI 45.73 kg/m²     Physical: Ms. Annie Lopez appears well, she is in no apparent distress, she demonstrates appropriate mood & affect. She is alert and oriented to person, place and time. She has mild swelling. There is No evidence of DVT seen on physical exam. She is neurovascularly intact distally. Range of motion is from 0 degrees to 110 degrees. The incision is  clean, dry and intact and without erythema. Strength in the knee is 4+/5. There is no instability with varus and valgus stressing of the knee. There is no pain with range of motion of the hips. Xrays: Three views of the left knee obtained at last visit were again reviewed. The prosthesis is well aligned and there is no evidence of loosening. Impression: Status post left Total Knee Arthroplasty, Doing well postoperatively. Plan:  She will continue to work aggressively on range of motion and strengthening: Natural history and expected course discussed. Questions answered. Quad strengthening exercises. The patient will gradually transition to an outpatient physical therapy program. The patient will follow up with me in 6 weeks. The patient was given a refill on the oxycodone.

## 2021-11-24 ENCOUNTER — HOSPITAL ENCOUNTER (OUTPATIENT)
Dept: PHYSICAL THERAPY | Age: 56
Setting detail: THERAPIES SERIES
Discharge: HOME | End: 2021-11-24
Payer: COMMERCIAL

## 2021-11-24 PROCEDURE — 97140 MANUAL THERAPY 1/> REGIONS: CPT | Performed by: PHYSICAL THERAPIST

## 2021-11-24 PROCEDURE — 97110 THERAPEUTIC EXERCISES: CPT | Performed by: PHYSICAL THERAPIST

## 2021-11-24 PROCEDURE — 97530 THERAPEUTIC ACTIVITIES: CPT | Performed by: PHYSICAL THERAPIST

## 2021-11-24 NOTE — FLOWSHEET NOTE
Strength L R Comment   Quad      Hamstring      Gastroc      Hip flexor      Hip ABD                      Special Test Results/Comment   Meniscal Click    Crepitus    Flexion Test    Valgus Laxity    Varus Laxity    Lachmans    Drop Back    Homans            Girth L R   Mid Patella     Suprapatellar     5cm above     15cm above       Kalyan Clinical Decision Rule for DVT    Clinical Presentation  Possible Score  Clients Score    Active cancer (within 6 months of diagnosis or receiving palliative care)  1  0   Paralysis, paresis, or recent immobilization of lower extremity  1  0   Bedridden for more than 3 days or major surgery in the last 4 weeks  1  0   Localized tenderness in the center of the posterior calf, the popliteal space, or along the femoral vein in the anterior thigh/groin  1  0   Entire lower extremity swelling  1  0   Unilateral calf swelling (more than 3 cm larger than uninvolved side)  1  0   Unilateral pitting edema  1  0   Collateral superficial veins (nonvaricose)  1  0   An alternative diagnosis is as likely (or more likely) than DVT (e.g., cellulitis, postoperative swelling, calf strain)  -2  -2   Total Points   -2     Key  · -2 to 0 Low probability of DVT 3% (95% confidence interval [CI] 1.7%-5.9%)  · 1 to 2 Moderate probability of DVT 17% (95% confidence interval [CI] 12%-23%)  · 3 or more High probability of DVT 75% (95% confidence interval [CI] 63%-84%)    Medical consultation is advised in the presence of low probability  Medical referral is required with moderate or high score. Kalyan PS, Richi DR, Martínze J, et al: Value of assessment of pretest probability of deep-vein thrombosis in clinical management, Lancet 467:3108-3423, 1997. RESTRICTIONS/PRECAUTIONS: HTN managed with medications; OA; powder gloves causes itchiness and breaking out.  R TKA in 2018.      Exercises/Interventions:     Exercise/Equipment Resistance/Repititions Other comments   Stretching Hamstring 5x:30    Hip Flexion     ITB- Rope     Grion     Quad     Inclined Calf 5x:30    Towel Pull     Piriformis                    SLR     Supine 2x5 Quad lag   Prone     Abduction     Adducton     SLR+          Clams hooklying 2x8 green              Isometrics     Quad sets 10x:10 propped   Ball Squeezes 10x:10    Patellar Glides 8' with scar massage focusing on superior scar Propped for part of it   Medial 2'    Superior 2'    Inferior 2'         ROM     Passive     Active     Weight Shift     Hang Weights     Sheet Pulls 10x:10    Ankle Pumps HEP    EOB ROM 10x:10    CKC     Calf raises 2x7    Wall sits     Step ups     1 leg stand     Squatting     CC TKE     Balance     Monster Walks     Bridging     Triple threats     Stool Scoots     PRE     Extension  RANGE:   Flexion  RANGE:        Cable Column          Leg Press  RANGE:        Bike 8' ROM    Treadmill            Access Code: [email or web address]  URL: [email or web address]  Date: 11/15/2021  Prepared by: Christina Walker    Exercises  Long Sitting Calf Stretch with Strap - 2 x daily - 7 x weekly - 3-5 sets - 30 hold  Seated Table Hamstring Stretch - 2 x daily - 7 x weekly - 3-5 sets - 30 hold  Supine Quadricep Sets - 2-10 x daily - 7 x weekly - 5-10 reps - 5-10 hold  Supine Ankle Pumps - 10 x daily - 7 x weekly - 3 sets - 10 reps  Supine Hip Adduction Isometric with Ball - 2 x daily - 7 x weekly - 10 reps - 5-10 hold  Supine Active Straight Leg Raise - 1-2 x daily - 7 x weekly - 1-3 sets - 5-10 reps  Hooklying Clamshell with Resistance - 2 x daily - 7 x weekly - 1-3 sets - 10 reps  Supine Heel Slide with Strap - 2 x daily - 7 x weekly - 5-10 sets - 5-10 hold             Therapeutic Exercise and NMR EXR  [x]  (47956) Provided verbal/tactile cueing for activities related to strengthening, flexibility, endurance, ROM for improvements in LE, proximal hip, and core control with self care, mobility, lifting, ambulation.   [x]  (35601) Provided verbal/tactile cueing for activities related to improving balance, coordination, kinesthetic sense, posture, motor skill, proprioception  to assist with LE, proximal hip, and core control in self care, mobility, lifting, ambulation and eccentric single leg control. NMR and Therapeutic Activities:    [x]  (21895 or 81918) Provided verbal/tactile cueing for activities related to improving balance, coordination, kinesthetic sense, posture, motor skill, proprioception and motor activation to allow for proper function of core, proximal hip and LE with self care and ADLs  [x]  (01110) Gait Re-education- Provided training and instruction to the patient for proper LE, core and proximal hip recruitment and positioning and eccentric body weight control with ambulation re-education including up and down stairs     Home Exercise Program:    [x]  (11526) Reviewed/Progressed HEP activities related to strengthening, flexibility, endurance, ROM of core, proximal hip and LE for functional self-care, mobility, lifting and ambulation/stair navigation   [x]  (24095)Reviewed/Progressed HEP activities related to improving balance, coordination, kinesthetic sense, posture, motor skill, proprioception of core, proximal hip and LE for self care, mobility, lifting, and ambulation/stair navigation      Manual Treatments:  PROM / STM / Oscillations-Mobs:  G-I, II, III, IV (PA's, Inf., Post.)  [x]  (78854) Provided manual therapy to mobilize LE, proximal hip and/or LS spine soft tissue/joints for the purpose of modulating pain, promoting relaxation,  increasing ROM, reducing/eliminating soft tissue swelling/inflammation/restriction, improving soft tissue extensibility and allowing for proper ROM for normal function with self care, mobility, lifting and ambulation.      Modalities: ice 15' top and bottom    Charges:   Timed Code Treatment Minutes: 62'   Total Treatment Minutes: 80'     []  EVAL (LOW) 455 1011   []  EVAL (MOD) 12456   []  EVAL (HIGH) [id number]   [] RE-EVAL   [x]  CL(98434) x1    []  IONTO  []  NMR (26192) x     []  VASO  [x]  Manual (23873) x 1     []  Other:  [x]  TA x 2     []  Mech Traction (28508)  []  ES(attended) (71721)      []  ES (un) (75906):       GOALS:   Patient stated goal: decrease pain    []? ? Progressing: []?? Met: []?? Not Met: []?? Adjusted     Therapist goals for Patient:   Short Term Goals: To be achieved in: 2 weeks  1. Independent in HEP and progression per patient tolerance, in order to prevent re-injury.     []? ? Progressing: []?? Met: []?? Not Met: []?? Adjusted   2. Pt will report pain at worst less than or equal to 8/10.  []?? Progressing: []?? Met: []?? Not Met: []?? Adjusted     Long Term Goals: To be achieved in: 16 weeks  1. Pt will demo a score 40% or better for the WOMAC to assist with reaching prior level of function.   []?? Progressing: []?? Met: []?? Not Met: []?? Adjusted  2. Patient will demonstrate increased AROM to knee ext to equal to 0 and knee flex greater than or equal to 110 to allow for proper joint functioning as indicated by patients Functional Deficits.    []? ? Progressing: []?? Met: []?? Not Met: []?? Adjusted  3. Patient will demonstrate an increase in strength to hip flex, hip ABD, and knee flex and ext strength 4 to allow for proper functional mobility as indicated by patients functional deficits. []?? Progressing: []?? Met: []?? Not Met: []?? Adjusted  4. Patient will return to performing all transfers with mod I with pain less than or equal to 3/10 in order to be independent in home mobility.  []?? Progressing: []?? Met: []?? Not Met: []?? Adjusted  5. Pt will report pain at worst less than or equal to 5/10.         []? ? Progressing: []?? Met: []?? Not Met: []?? Adjusted       Overall Progression Towards Functional goals/ Treatment Progress Update:  [] Patient is progressing as expected towards functional goals listed. [] Progression is slowed due to complexities/Impairments listed.   [] Progression has been slowed due to co-morbidities. [x] Plan just implemented, too soon to assess goals progression <30days   [] Goals require adjustment due to lack of progress  [] Patient is not progressing as expected and requires additional follow up with physician  [] Other    Prognosis for POC: [x] Good [] Fair  [] Poor      Patient requires continued skilled intervention: [x] Yes  [] No    Treatment/Activity Tolerance:  [x] Patient able to complete treatment  [x] Patient limited by fatigue  [x] Patient limited by pain     [] Patient limited by other medical complications  [] Other: Pt bhumika tx fair. She continues to report pain in the posterior knee with extension, which is most likely due to her tightness. Pt did have limited motion when seen pre op, which is a contributing factor to her tightness currently. I continue to encourage her to work on ROM and focus on TERT. I've also asked her again to bring her [facility] in to practice her gt. Pt would benefit from skilled PT to improve strength, ROM, pain, and function. PLAN: If pt doesn't return, this note can be considered a D/C note. Consider increasing time on bike and sit to stands.    [x] Continue per plan of care [] Alter current plan (see comments above)  [] Plan of care initiated [] Hold pending MD visit [] Discharge  Electronically signed by: Che Rendon, DPT [id number]

## 2021-11-29 ENCOUNTER — HOSPITAL ENCOUNTER (OUTPATIENT)
Dept: PHYSICAL THERAPY | Age: 56
Setting detail: THERAPIES SERIES
Discharge: HOME | End: 2021-11-29
Payer: COMMERCIAL

## 2021-11-29 PROCEDURE — 97110 THERAPEUTIC EXERCISES: CPT | Performed by: PHYSICAL THERAPIST

## 2021-11-29 PROCEDURE — 97530 THERAPEUTIC ACTIVITIES: CPT | Performed by: PHYSICAL THERAPIST

## 2021-11-29 PROCEDURE — 97140 MANUAL THERAPY 1/> REGIONS: CPT | Performed by: PHYSICAL THERAPIST

## 2021-11-29 NOTE — FLOWSHEET NOTE
[facility] a Department of [address]  [address], [address]  Office: [phone]  Fax:  [phone]                                                                  Physical Therapy Treatment Note/ Progress Report:                   Date:  2021    Patient Name:  Cleve Charles    :  1965  MRN: [id number]  Restrictions/Precautions:    Medical/Treatment Diagnosis Information:  · Diagnosis: M17.12 (ICD-10-CM) - Primary osteoarthritis of left knee; Z96.651 (ICD-10-CM) - Status post total right knee replacement. Surgery on 10/11/21  · Treatment Diagnosis: M25.562 - left knee pain  Insurance/Certification information:  PT Insurance Information: R  Physician Information:  Referring Practitioner: Devi Torres  Has the plan of care been signed (Y/N):        []  Yes  [x]  No     Date of Patient follow up with Physician:       Is this a Progress Report:     []  Yes  [x]  No        If Yes:  Date Range for reporting period:  Beginning 21  Ending    Progress report will be due (10 Rx or 30 days whichever is less): visit 12 or 12 Dec 21           Visit # Insurance Allowable Auth Required   6 (+ approx 8 visits elsewhere) 30 []  Yes [x]  No        Functional Scale: WOMAC-89.6%   Date assessed: 11/15/2021    Latex Allergy:  [x]NO      []YES  Preferred Language for Healthcare:   [x]English       []other:      Pain level:  6/10    SUBJECTIVE:  Pt presents with SPC today; was starting to get back pain from walker. Did take pain pill before coming in today because she knows PT makes her sore. Feels like she starts to loosen up and then her knee swells up on her; especially tight and painful in mornings. Pain is occasionally waking her up at night but is able to sleep on her side with pillow between her knees.      OBJECTIVE: 21    Observation:     Test measurements:      Flexibility L R Comment   Hamstring      Gastroc      ITB      Quad                ROM PROM AROM Overpressure Comment    L R L R L R    Flexion 101 sheet pulls         Extension   lacking 9 at best                               Strength L R Comment   Quad      Hamstring      Gastroc      Hip flexor      Hip ABD                      Special Test Results/Comment   Meniscal Click    Crepitus    Flexion Test    Valgus Laxity    Varus Laxity    Lachmans    Drop Back    Homans            Girth L R   Mid Patella     Suprapatellar     5cm above     15cm above       Kalyan Clinical Decision Rule for DVT    Clinical Presentation  Possible Score  Clients Score    Active cancer (within 6 months of diagnosis or receiving palliative care)  1  0   Paralysis, paresis, or recent immobilization of lower extremity  1  0   Bedridden for more than 3 days or major surgery in the last 4 weeks  1  0   Localized tenderness in the center of the posterior calf, the popliteal space, or along the femoral vein in the anterior thigh/groin  1  0   Entire lower extremity swelling  1  0   Unilateral calf swelling (more than 3 cm larger than uninvolved side)  1  0   Unilateral pitting edema  1  0   Collateral superficial veins (nonvaricose)  1  0   An alternative diagnosis is as likely (or more likely) than DVT (e.g., cellulitis, postoperative swelling, calf strain)  -2  -2   Total Points   -2     Key  · -2 to 0 Low probability of DVT 3% (95% confidence interval [CI] 1.7%-5.9%)  · 1 to 2 Moderate probability of DVT 17% (95% confidence interval [CI] 12%-23%)  · 3 or more High probability of DVT 75% (95% confidence interval [CI] 63%-84%)    Medical consultation is advised in the presence of low probability  Medical referral is required with moderate or high score.       Kalyan PS, Richi LARIOS, Martínez J, et al: Value of assessment of pretest probability of deep-vein thrombosis in clinical management, Lancet 999:2536-9316, 1997.      RESTRICTIONS/PRECAUTIONS: HTN managed with medications; OA; powder gloves causes itchiness and breaking out.  R TKA in 2018. Exercises/Interventions:     Exercise/Equipment Resistance/Repititions Other comments   Stretching     Hamstring 5x:30    Hip Flexion     ITB- Rope     Grion     Quad     Inclined Calf 5x:30    Towel Pull     Piriformis                    SLR     Supine 2x5 Quad lag   Prone     Abduction     Adducton     SLR+          Clams hooklying 2x10 green              Isometrics     Quad sets 10x:10 propped   Ball Squeezes 10x:10    Patellar Glides 8' with scar massage focusing on superior scar with over pressure 3x:10 in ext Propped for part of it   Medial 2'    Superior 2'    Inferior 2'         ROM     Passive     Active     Weight Shift     Hang Weights     Sheet Pulls 10x:10    Ankle Pumps HEP    EOB ROM 10x:10    CKC     Calf raises 2x7    Wall sits     Step ups     1 leg stand     Squatting 10 mini   CC TKE     Balance     Monster Walks     Bridging     Triple threats     Stool Scoots     PRE     Extension  RANGE:   Flexion  RANGE:        Cable Column          Leg Press  RANGE:        Bike 8' ROM    Treadmill            Access Code: [employer]  URL: [email or web address]  Date: 11/15/2021  Prepared by: Renae Walker    Exercises  Long Sitting Calf Stretch with Strap - 2 x daily - 7 x weekly - 3-5 sets - 30 hold  Seated Table Hamstring Stretch - 2 x daily - 7 x weekly - 3-5 sets - 30 hold  Supine Quadricep Sets - 2-10 x daily - 7 x weekly - 5-10 reps - 5-10 hold  Supine Ankle Pumps - 10 x daily - 7 x weekly - 3 sets - 10 reps  Supine Hip Adduction Isometric with Ball - 2 x daily - 7 x weekly - 10 reps - 5-10 hold  Supine Active Straight Leg Raise - 1-2 x daily - 7 x weekly - 1-3 sets - 5-10 reps  Hooklying Clamshell with Resistance - 2 x daily - 7 x weekly - 1-3 sets - 10 reps  Supine Heel Slide with Strap - 2 x daily - 7 x weekly - 5-10 sets - 5-10 hold             Therapeutic Exercise and NMR EXR  [x]  (34699) Provided verbal/tactile cueing for activities related to strengthening, flexibility, endurance, ROM for improvements in LE, proximal hip, and core control with self care, mobility, lifting, ambulation. [x]  (62274) Provided verbal/tactile cueing for activities related to improving balance, coordination, kinesthetic sense, posture, motor skill, proprioception  to assist with LE, proximal hip, and core control in self care, mobility, lifting, ambulation and eccentric single leg control.      NMR and Therapeutic Activities:    [x]  (02137 or 88122) Provided verbal/tactile cueing for activities related to improving balance, coordination, kinesthetic sense, posture, motor skill, proprioception and motor activation to allow for proper function of core, proximal hip and LE with self care and ADLs  [x]  (36529) Gait Re-education- Provided training and instruction to the patient for proper LE, core and proximal hip recruitment and positioning and eccentric body weight control with ambulation re-education including up and down stairs     Home Exercise Program:    [x]  (13415) Reviewed/Progressed HEP activities related to strengthening, flexibility, endurance, ROM of core, proximal hip and LE for functional self-care, mobility, lifting and ambulation/stair navigation   [x]  (68888)Reviewed/Progressed HEP activities related to improving balance, coordination, kinesthetic sense, posture, motor skill, proprioception of core, proximal hip and LE for self care, mobility, lifting, and ambulation/stair navigation      Manual Treatments:  PROM / STM / Oscillations-Mobs:  G-I, II, III, IV (PA's, Inf., Post.)  [x]  (66577) Provided manual therapy to mobilize LE, proximal hip and/or LS spine soft tissue/joints for the purpose of modulating pain, promoting relaxation,  increasing ROM, reducing/eliminating soft tissue swelling/inflammation/restriction, improving soft tissue extensibility and allowing for proper ROM for normal function with self care, mobility, lifting and ambulation. Modalities: ice 15' top and bottom. Propped for about 2' to pt bhumika    Charges:   Timed Code Treatment Minutes: 62'   Total Treatment Minutes: [de-identified]'     []  EVAL (LOW) 455 1011   []  EVAL (MOD) 42758   []  EVAL (HIGH) 11946   []  RE-EVAL   [x]  MI(96796) x1    []  IONTO  []  NMR (09553) x     []  VASO  [x]  Manual (00334) x 1     []  Other:  [x]  TA x 2     []  Mech Traction (00262)  []  ES(attended) (35920)      []  ES (un) (68655):       GOALS:   Patient stated goal: decrease pain    []? ? Progressing: []?? Met: []?? Not Met: []?? Adjusted     Therapist goals for Patient:   Short Term Goals: To be achieved in: 2 weeks  1. Independent in HEP and progression per patient tolerance, in order to prevent re-injury.     []? ? Progressing: []?? Met: []?? Not Met: []?? Adjusted   2. Pt will report pain at worst less than or equal to 8/10.  []?? Progressing: []?? Met: []?? Not Met: []?? Adjusted     Long Term Goals: To be achieved in: 16 weeks  1. Pt will demo a score 40% or better for the WOMAC to assist with reaching prior level of function.   []?? Progressing: []?? Met: []?? Not Met: []?? Adjusted  2. Patient will demonstrate increased AROM to knee ext to equal to 0 and knee flex greater than or equal to 110 to allow for proper joint functioning as indicated by patients Functional Deficits.    []? ? Progressing: []?? Met: []?? Not Met: []?? Adjusted  3. Patient will demonstrate an increase in strength to hip flex, hip ABD, and knee flex and ext strength 4 to allow for proper functional mobility as indicated by patients functional deficits. []?? Progressing: []?? Met: []?? Not Met: []?? Adjusted  4. Patient will return to performing all transfers with mod I with pain less than or equal to 3/10 in order to be independent in home mobility.  []?? Progressing: []?? Met: []?? Not Met: []?? Adjusted  5.  Pt will report pain at worst less than or equal to 5/10.         []? ? Progressing: []?? Met: []?? Not Met: []?? Adjusted       Overall Progression Towards Functional goals/ Treatment Progress Update:  [] Patient is progressing as expected towards functional goals listed. [] Progression is slowed due to complexities/Impairments listed. [] Progression has been slowed due to co-morbidities. [x] Plan just implemented, too soon to assess goals progression <30days   [] Goals require adjustment due to lack of progress  [] Patient is not progressing as expected and requires additional follow up with physician  [] Other    Prognosis for POC: [x] Good [] Fair  [] Poor      Patient requires continued skilled intervention: [x] Yes  [] No    Treatment/Activity Tolerance:  [x] Patient able to complete treatment  [x] Patient limited by fatigue  [x] Patient limited by pain     [] Patient limited by other medical complications  [] Other: Pt bhumika tx fair. Pt did bring in her cane today. She does appear to amb with this safely. We discussed using the walker when she gets up in the morning or for longer distances/unsafe situations. She is understanding. I went over TERT again and working on her ext. I've asked her again to be in full ext for 1 hr a day, and that can be t/o the day in short segments. She does not bhumika manual overpressure well. We discussed the potential of getting a home extension device. I've reviewed how she can work on extension at home and that propping pillows under her knee or sitting on the couch with her leg in front of her is not enough to work on getting it straight. She demo Pt would benefit from skilled PT to improve strength, ROM, pain, and function. PLAN: If pt doesn't return, this note can be considered a D/C note. Consider increasing time on bike and sit to stands.    [x] Continue per plan of care [] Alter current plan (see comments above)  [] Plan of care initiated [] Hold pending MD visit [] Discharge  Electronically signed by: Emma Bell PTA    Co treated by Cindy Lacy, [facility], DPT [id number]

## 2021-12-01 ENCOUNTER — HOSPITAL ENCOUNTER (OUTPATIENT)
Dept: PHYSICAL THERAPY | Age: 56
Setting detail: THERAPIES SERIES
Discharge: HOME | End: 2021-12-01
Payer: COMMERCIAL

## 2021-12-01 PROCEDURE — 97530 THERAPEUTIC ACTIVITIES: CPT | Performed by: PHYSICAL THERAPIST

## 2021-12-01 PROCEDURE — 97110 THERAPEUTIC EXERCISES: CPT | Performed by: PHYSICAL THERAPIST

## 2021-12-01 PROCEDURE — 97140 MANUAL THERAPY 1/> REGIONS: CPT | Performed by: PHYSICAL THERAPIST

## 2021-12-01 NOTE — FLOWSHEET NOTE
100 [facility] a Department of [address]  [facility] [place] [address], [address]  Office: [phone]  Fax:  [phone]                                                                  Physical Therapy Treatment Note/ Progress Report:                   Date:  2021    Patient Name:  Cachorro Collazo    :  1965  MRN: [id number]  Restrictions/Precautions:    Medical/Treatment Diagnosis Information:  · Diagnosis: M17.12 (ICD-10-CM) - Primary osteoarthritis of left knee; Z96.651 (ICD-10-CM) - Status post total right knee replacement. Surgery on 10/11/21  · Treatment Diagnosis: M25.562 - left knee pain  Insurance/Certification information:  PT Insurance Information: UMR  Physician Information:  Referring Practitioner: Janice Anderson  Has the plan of care been signed (Y/N):        []  Yes  [x]  No     Date of Patient follow up with Physician:       Is this a Progress Report:     []  Yes  [x]  No        If Yes:  Date Range for reporting period:  Beginning 21  Ending    Progress report will be due (10 Rx or 30 days whichever is less): visit 12 or 12 Dec 21           Visit # Insurance Allowable Auth Required   7 (+ approx 8 visits elsewhere) 30 []  Yes [x]  No        Functional Scale: WOMAC-89.6%   Date assessed: 11/15/2021    Latex Allergy:  [x]NO      []YES  Preferred Language for Healthcare:   [x]English       []other:      Pain level:  7-8/10    SUBJECTIVE:  She is very stiff and painful today. She thinks the weather is really affecting her. She states compliance with her HEP.       OBJECTIVE: 21    Observation:     Test measurements:      Flexibility L R Comment   Hamstring      Gastroc      ITB      Quad                ROM PROM AROM Overpressure Comment    L R L R L R    Flexion 100 sheet pulls         Extension     Lacking 6 at best Strength L R Comment   Quad      Hamstring      Gastroc      Hip flexor      Hip ABD                      Special Test Results/Comment   Meniscal Click    Crepitus    Flexion Test    Valgus Laxity    Varus Laxity    Lachmans    Drop Back    Homans            Girth L R   Mid Patella     Suprapatellar     5cm above     15cm above       Kalyan Clinical Decision Rule for DVT    Clinical Presentation  Possible Score  Clients Score    Active cancer (within 6 months of diagnosis or receiving palliative care)  1  0   Paralysis, paresis, or recent immobilization of lower extremity  1  0   Bedridden for more than 3 days or major surgery in the last 4 weeks  1  0   Localized tenderness in the center of the posterior calf, the popliteal space, or along the femoral vein in the anterior thigh/groin  1  0   Entire lower extremity swelling  1  0   Unilateral calf swelling (more than 3 cm larger than uninvolved side)  1  0   Unilateral pitting edema  1  0   Collateral superficial veins (nonvaricose)  1  0   An alternative diagnosis is as likely (or more likely) than DVT (e.g., cellulitis, postoperative swelling, calf strain)  -2  -2   Total Points   -2     Key  · -2 to 0 Low probability of DVT 3% (95% confidence interval [CI] 1.7%-5.9%)  · 1 to 2 Moderate probability of DVT 17% (95% confidence interval [CI] 12%-23%)  · 3 or more High probability of DVT 75% (95% confidence interval [CI] 63%-84%)    Medical consultation is advised in the presence of low probability  Medical referral is required with moderate or high score. Kalyan PS, Richi DR, Martínez J, et al: Value of assessment of pretest probability of deep-vein thrombosis in clinical management, Lancet 803:7467-5546, 1997. RESTRICTIONS/PRECAUTIONS: HTN managed with medications; OA; powder gloves causes itchiness and breaking out.  R TKA in 2018.      Exercises/Interventions:     Exercise/Equipment Resistance/Repititions Other comments   Stretching     Hamstring 5x:30    Hip Flexion     ITB- Rope     Grion     Quad     Inclined Calf 5x:30    Towel Pull     Piriformis                    SLR     Supine 2x5 Quad lag   Prone     Abduction 3x5    Adducton     SLR+               Seated march 3x10    Clams                Isometrics     Quad sets 10x:10 propped   Ball Squeezes     Patellar Glides 8' with scar massage focusing on superior scar with over pressure 3x:20 in ext Propped for part of it   Medial 2'    Superior 2'    Inferior 2'         ROM     Passive     Active     Weight Shift     Hang Weights     Sheet Pulls 10x:10    Ankle Pumps HEP    EOB ROM     CKC     Calf raises 3x10    Wall sits     Step ups     1 leg stand     Squatting 10x with Airex cushion Sit to stand from chair   CC TKE     Balance     Monster Walks     Bridging     Triple threats     Stool Scoots     PRE     Extension  RANGE:   Flexion  RANGE:        Cable Column          Leg Press  RANGE:        Bike 8' ROM    Treadmill            Access Code: Eunice Saleh  URL: [email or web address]  Date: 11/15/2021  Prepared by: Liberty Walker    Exercises  Long Sitting Calf Stretch with Strap - 2 x daily - 7 x weekly - 3-5 sets - 30 hold  Seated Table Hamstring Stretch - 2 x daily - 7 x weekly - 3-5 sets - 30 hold  Supine Quadricep Sets - 2-10 x daily - 7 x weekly - 5-10 reps - 5-10 hold  Supine Ankle Pumps - 10 x daily - 7 x weekly - 3 sets - 10 reps  Supine Hip Adduction Isometric with Ball - 2 x daily - 7 x weekly - 10 reps - 5-10 hold  Supine Active Straight Leg Raise - 1-2 x daily - 7 x weekly - 1-3 sets - 5-10 reps  Hooklying Clamshell with Resistance - 2 x daily - 7 x weekly - 1-3 sets - 10 reps  Supine Heel Slide with Strap - 2 x daily - 7 x weekly - 5-10 sets - 5-10 hold             Therapeutic Exercise and NMR EXR  [x]  (29982) Provided verbal/tactile cueing for activities related to strengthening, flexibility, endurance, ROM for improvements in LE, proximal hip, and core control with self care, mobility, lifting, ambulation. [x]  (64462) Provided verbal/tactile cueing for activities related to improving balance, coordination, kinesthetic sense, posture, motor skill, proprioception  to assist with LE, proximal hip, and core control in self care, mobility, lifting, ambulation and eccentric single leg control. NMR and Therapeutic Activities:    [x]  (42826 or 45811) Provided verbal/tactile cueing for activities related to improving balance, coordination, kinesthetic sense, posture, motor skill, proprioception and motor activation to allow for proper function of core, proximal hip and LE with self care and ADLs  [x]  (28541) Gait Re-education- Provided training and instruction to the patient for proper LE, core and proximal hip recruitment and positioning and eccentric body weight control with ambulation re-education including up and down stairs     Home Exercise Program:    [x]  (62666) Reviewed/Progressed HEP activities related to strengthening, flexibility, endurance, ROM of core, proximal hip and LE for functional self-care, mobility, lifting and ambulation/stair navigation   [x]  (93252)Reviewed/Progressed HEP activities related to improving balance, coordination, kinesthetic sense, posture, motor skill, proprioception of core, proximal hip and LE for self care, mobility, lifting, and ambulation/stair navigation      Manual Treatments:  PROM / STM / Oscillations-Mobs:  G-I, II, III, IV (PA's, Inf., Post.)  [x]  (96304) Provided manual therapy to mobilize LE, proximal hip and/or LS spine soft tissue/joints for the purpose of modulating pain, promoting relaxation,  increasing ROM, reducing/eliminating soft tissue swelling/inflammation/restriction, improving soft tissue extensibility and allowing for proper ROM for normal function with self care, mobility, lifting and ambulation. Modalities: ice 15' top and bottom.   Propped for about 4-6' to pt bhumika    Charges:   Timed Code Treatment Minutes: 61' Total Treatment Minutes: [de-identified]'     []  EVAL (LOW) 00870   []  EVAL (MOD) 01682   []  EVAL (HIGH) 12114   []  RE-EVAL   [x]  GF(70103) x1    []  IONTO  []  NMR (33215) x     []  VASO  [x]  Manual (21856) x 1     []  Other:  [x]  TA x 2     []  [employer] (17262)  []  ES(attended) (52194)      []  ES (un) (17234):       GOALS:   Patient stated goal: decrease pain    []? ? Progressing: []?? Met: []?? Not Met: []?? Adjusted     Therapist goals for Patient:   Short Term Goals: To be achieved in: 2 weeks  1. Independent in HEP and progression per patient tolerance, in order to prevent re-injury.     []? ? Progressing: []?? Met: []?? Not Met: []?? Adjusted   2. Pt will report pain at worst less than or equal to 8/10.  []?? Progressing: []?? Met: []?? Not Met: []?? Adjusted     Long Term Goals: To be achieved in: 16 weeks  1. Pt will demo a score 40% or better for the WOMAC to assist with reaching prior level of function.   []?? Progressing: []?? Met: []?? Not Met: []?? Adjusted  2. Patient will demonstrate increased AROM to knee ext to equal to 0 and knee flex greater than or equal to 110 to allow for proper joint functioning as indicated by patients Functional Deficits.    []? ? Progressing: []?? Met: []?? Not Met: []?? Adjusted  3. Patient will demonstrate an increase in strength to hip flex, hip ABD, and knee flex and ext strength 4 to allow for proper functional mobility as indicated by patients functional deficits. []?? Progressing: []?? Met: []?? Not Met: []?? Adjusted  4. Patient will return to performing all transfers with mod I with pain less than or equal to 3/10 in order to be independent in home mobility.  []?? Progressing: []?? Met: []?? Not Met: []?? Adjusted  5. Pt will report pain at worst less than or equal to 5/10.         []? ? Progressing: []?? Met: []?? Not Met: []?? Adjusted       Overall Progression Towards Functional goals/ Treatment Progress Update:  [] Patient is progressing as expected towards functional goals listed. [] Progression is slowed due to complexities/Impairments listed. [] Progression has been slowed due to co-morbidities. [x] Plan just implemented, too soon to assess goals progression <30days   [] Goals require adjustment due to lack of progress  [] Patient is not progressing as expected and requires additional follow up with physician  [] Other    Prognosis for POC: [x] Good [] Fair  [] Poor      Patient requires continued skilled intervention: [x] Yes  [] No    Treatment/Activity Tolerance:  [x] Patient able to complete treatment  [x] Patient limited by fatigue  [x] Patient limited by pain     [] Patient limited by other medical complications  [] Other: Pt bhumika tx fair. She continues to struggle with strength, endurance, and ROM. Her flexion is pretty good compared to her prehab measurements; however, she still lacks ext. She demo guarding with manual overpressure and patellar mobs. She does forget instructions and requires cuing t/o tx for proper form, holds, and reps. She was able to bhumika being propped for longer today demo increasing motivation. She demo Pt would benefit from skilled PT to improve strength, ROM, pain, and function. PLAN: If pt doesn't return, this note can be considered a D/C note. Continue to progress strength. Consider prone hangs vs time propped.    [x] Continue per plan of care [] Alter current plan (see comments above)  [] Plan of care initiated [] Hold pending MD visit [] Discharge    Electronically signed by: Neha Gonzalez, [id number]

## 2021-12-03 ENCOUNTER — TELEPHONE (OUTPATIENT)
Dept: ORTHOPEDIC SURGERY | Age: 56
End: 2021-12-03

## 2021-12-03 DIAGNOSIS — Z96.651 STATUS POST TOTAL RIGHT KNEE REPLACEMENT: ICD-10-CM

## 2021-12-03 RX ORDER — OXYCODONE HYDROCHLORIDE 5 MG/1
5 TABLET ORAL
Qty: 21 TABLET | Refills: 0 | Status: SHIPPED | OUTPATIENT
Start: 2021-12-03 | End: 2021-12-09 | Stop reason: ALTCHOICE

## 2021-12-03 NOTE — TELEPHONE ENCOUNTER
Prescription Refill     Medication Name:  OXYCODONE - 5 MG  Pharmacy: [address] [place] [address]  Patient Contact Number:  [phone]    PATIENT IS COMPLETELY OUT OF MEDICATION    PATIENT TOOK LAST PILL THIS MORNING.

## 2021-12-06 ENCOUNTER — HOSPITAL ENCOUNTER (OUTPATIENT)
Dept: PHYSICAL THERAPY | Age: 56
Setting detail: THERAPIES SERIES
Discharge: HOME | End: 2021-12-06
Payer: COMMERCIAL

## 2021-12-06 PROCEDURE — 97140 MANUAL THERAPY 1/> REGIONS: CPT | Performed by: PHYSICAL THERAPIST

## 2021-12-06 PROCEDURE — 97110 THERAPEUTIC EXERCISES: CPT | Performed by: PHYSICAL THERAPIST

## 2021-12-06 PROCEDURE — 97530 THERAPEUTIC ACTIVITIES: CPT | Performed by: PHYSICAL THERAPIST

## 2021-12-06 NOTE — FLOWSHEET NOTE
100 [facility] Performance and Rehabilitation a Department of [address]  [facility] [place] [address], [address]  Office: [phone]  Fax:  [phone]                                                                  Physical Therapy Treatment Note/ Progress Report:                   Date:  2021    Patient Name:  Servando Colorado    :  1965  MRN: [id number]  Restrictions/Precautions:    Medical/Treatment Diagnosis Information:  · Diagnosis: M17.12 (ICD-10-CM) - Primary osteoarthritis of left knee; Z96.651 (ICD-10-CM) - Status post total right knee replacement. Surgery on 10/11/21  · Treatment Diagnosis: M25.562 - left knee pain  Insurance/Certification information:  PT Insurance Information: UMR  Physician Information:  Referring Practitioner: Jude Sheffield  Has the plan of care been signed (Y/N):        []  Yes  [x]  No     Date of Patient follow up with Physician:       Is this a Progress Report:     []  Yes  [x]  No        If Yes:  Date Range for reporting period:  Beginning 21  Ending    Progress report will be due (10 Rx or 30 days whichever is less): visit 12 or 12 Dec 21           Visit # Insurance Allowable Auth Required   8 (+ approx 8 visits elsewhere) 30 []  Yes [x]  No        Functional Scale: WOMAC-89.6%   Date assessed: 11/15/2021    Latex Allergy:  [x]NO      []YES  Preferred Language for Healthcare:   [x]English       []other:      Pain level:  7-8/10    SUBJECTIVE:  The patient noted that she was feeling good yesterday, but with the rain day her knee is more stiff and sore.      OBJECTIVE: 21    Observation:     Test measurements:      Flexibility L R Comment   Hamstring      Gastroc      ITB      Quad                ROM PROM AROM Overpressure Comment    L R L R L R    Flexion 105 sheet pulls         Extension     Lacking 5 at best                             Strength L R Flexion     ITB- Rope     Grion     Quad     Inclined Calf 5x:30    Towel Pull     Piriformis                    SLR     Supine 2 x 10  Quad lag   Prone     Abduction 2 x 10     Adducton     SLR+               Seated march 3x10    Clams                Isometrics     Quad sets 10x:10 propped   [place] Squeezes 10x:10    Patellar Glides 8' with scar massage focusing on superior scar with over pressure 3x:20 in ext Propped for part of it   Medial 2'    Superior 2'    Inferior 2'         ROM     Passive     Active     Weight Shift     Hang Weights     Sheet Pulls 10x:10    Ankle Pumps 3'    EOB ROM     CKC     Calf raises 3x10    Wall sits     Step ups     1 leg stand     Squatting 2 x 7 with cushion Sit to stand from chair   CC TKE     Balance     Monster Walks     Bridging     Triple threats     Stool Scoots     PRE     Extension  RANGE:   Flexion  RANGE:        Cable Column          Leg Press  RANGE:        Bike 8' ROM    Treadmill            Access Code: [email or web address]  URL: [email or web address]  Date: 11/15/2021  Prepared by: Katarina Walker    Exercises  Long Sitting Calf Stretch with Strap - 2 x daily - 7 x weekly - 3-5 sets - 30 hold  Seated Table Hamstring Stretch - 2 x daily - 7 x weekly - 3-5 sets - 30 hold  Supine Quadricep Sets - 2-10 x daily - 7 x weekly - 5-10 reps - 5-10 hold  Supine Ankle Pumps - 10 x daily - 7 x weekly - 3 sets - 10 reps  Supine Hip Adduction Isometric with Ball - 2 x daily - 7 x weekly - 10 reps - 5-10 hold  Supine Active Straight Leg Raise - 1-2 x daily - 7 x weekly - 1-3 sets - 5-10 reps  Hooklying Clamshell with Resistance - 2 x daily - 7 x weekly - 1-3 sets - 10 reps  Supine Heel Slide with Strap - 2 x daily - 7 x weekly - 5-10 sets - 5-10 hold             Therapeutic Exercise and NMR EXR  [x]  (24406) Provided verbal/tactile cueing for activities related to strengthening, flexibility, endurance, ROM for improvements in LE, proximal hip, and core control with self care, mobility, lifting, ambulation. [x]  (36253) Provided verbal/tactile cueing for activities related to improving balance, coordination, kinesthetic sense, posture, motor skill, proprioception  to assist with LE, proximal hip, and core control in self care, mobility, lifting, ambulation and eccentric single leg control. NMR and Therapeutic Activities:    [x]  (40612 or 47047) Provided verbal/tactile cueing for activities related to improving balance, coordination, kinesthetic sense, posture, motor skill, proprioception and motor activation to allow for proper function of core, proximal hip and LE with self care and ADLs  [x]  (43752) Gait Re-education- Provided training and instruction to the patient for proper LE, core and proximal hip recruitment and positioning and eccentric body weight control with ambulation re-education including up and down stairs     Home Exercise Program:    [x]  (04366) Reviewed/Progressed HEP activities related to strengthening, flexibility, endurance, ROM of core, proximal hip and LE for functional self-care, mobility, lifting and ambulation/stair navigation   [x]  (62616)Reviewed/Progressed HEP activities related to improving balance, coordination, kinesthetic sense, posture, motor skill, proprioception of core, proximal hip and LE for self care, mobility, lifting, and ambulation/stair navigation      Manual Treatments:  PROM / STM / Oscillations-Mobs:  G-I, II, III, IV (PA's, Inf., Post.)  [x]  (44937) Provided manual therapy to mobilize LE, proximal hip and/or LS spine soft tissue/joints for the purpose of modulating pain, promoting relaxation,  increasing ROM, reducing/eliminating soft tissue swelling/inflammation/restriction, improving soft tissue extensibility and allowing for proper ROM for normal function with self care, mobility, lifting and ambulation. Modalities: ice 15' top and bottom.   Propped for about 5:30' intermittent breaks    Charges:   Timed Code Treatment expected towards functional goals listed. [] Progression is slowed due to complexities/Impairments listed. [] Progression has been slowed due to co-morbidities. [x] Plan just implemented, too soon to assess goals progression <30days   [] Goals require adjustment due to lack of progress  [] Patient is not progressing as expected and requires additional follow up with physician  [] Other    Prognosis for POC: [x] Good [] Fair  [] Poor      Patient requires continued skilled intervention: [x] Yes  [] No    Treatment/Activity Tolerance:  [x] Patient able to complete treatment  [x] Patient limited by fatigue  [x] Patient limited by pain     [] Patient limited by other medical complications  [] Other: Pt bhumika tx fair. The patient continues to struggle with both flexion and extension ROM. Freq verbal and physical cues needed to ensure proper tech. Compliance towards HEP guidelines might be an issue. Patient education about freq and duration of activities provided. PLAN: If pt doesn't return, this note can be considered a D/C note. Consider prone hangs vs time propped.    [x] Continue per plan of care [] Alter current plan (see comments above)  [] Plan of care initiated [] Hold pending MD visit [] Discharge    Electronically signed by: Lalo Gu, PT, MPT

## 2021-12-08 ENCOUNTER — HOSPITAL ENCOUNTER (OUTPATIENT)
Dept: PHYSICAL THERAPY | Age: 56
Setting detail: THERAPIES SERIES
Discharge: HOME | End: 2021-12-08
Payer: COMMERCIAL

## 2021-12-08 PROCEDURE — 97110 THERAPEUTIC EXERCISES: CPT | Performed by: PHYSICAL THERAPIST

## 2021-12-08 PROCEDURE — 97140 MANUAL THERAPY 1/> REGIONS: CPT | Performed by: PHYSICAL THERAPIST

## 2021-12-08 PROCEDURE — 97530 THERAPEUTIC ACTIVITIES: CPT | Performed by: PHYSICAL THERAPIST

## 2021-12-08 NOTE — FLOWSHEET NOTE
[facility] a Department of [address]  [address], [address]  Office: [phone]  Fax:  [phone]                                                                  Physical Therapy Treatment Note/ Progress Report:                   Date:  2021    Patient Name:  Callie Morales    :  1965  MRN: [id number]  Restrictions/Precautions:    Medical/Treatment Diagnosis Information:  · Diagnosis: M17.12 (ICD-10-CM) - Primary osteoarthritis of left knee; Z96.651 (ICD-10-CM) - Status post total right knee replacement. Surgery on 10/11/21  · Treatment Diagnosis: M25.562 - left knee pain  Insurance/Certification information:  PT Insurance Information: UMR  Physician Information:  Referring Practitioner: Izzy Ballard  Has the plan of care been signed (Y/N):        []  Yes  [x]  No     Date of Patient follow up with Physician:       Is this a Progress Report:     []  Yes  [x]  No        If Yes:  Date Range for reporting period:  Beginning 21  Ending     Progress report will be due (10 Rx or 30 days whichever is less): visit 12 or 12 Dec 21           Visit # Insurance Allowable Auth Required   9 (+ approx 8 visits elsewhere) 30 []  Yes [x]  No        Functional Scale: WOMAC-89.6%   Date assessed: 11/15/2021    Latex Allergy:  [x]NO      []YES  Preferred Language for Healthcare:   [x]English       []other:      Pain level:  6/10    SUBJECTIVE:  It wasn't too bad yesterday. She thinks that the snow and rain made her stiff today. She continues to take pain pills before coming to tx.      OBJECTIVE: 21    Observation:     Test measurements:      Flexibility L R Comment   Hamstring      Gastroc      ITB      Quad                ROM PROM AROM Overpressure Comment    L R L R L R    Flexion 110 sheet pulls         Extension     Lacking 5 at best Strength L R Comment   Quad      Hamstring      Gastroc      Hip flexor      Hip ABD                      Special Test Results/Comment   Meniscal Click    Crepitus    Flexion Test    Valgus Laxity    Varus Laxity    Lachmans    Drop Back    Homans            Girth L R   Mid Patella     Suprapatellar     5cm above     15cm above       Kalyan Clinical Decision Rule for DVT    Clinical Presentation  Possible Score  Clients Score    Active cancer (within 6 months of diagnosis or receiving palliative care)  1  0   Paralysis, paresis, or recent immobilization of lower extremity  1  0   Bedridden for more than 3 days or major surgery in the last 4 weeks  1  0   Localized tenderness in the center of the posterior calf, the popliteal space, or along the femoral vein in the anterior thigh/groin  1  0   Entire lower extremity swelling  1  0   Unilateral calf swelling (more than 3 cm larger than uninvolved side)  1  0   Unilateral pitting edema  1  0   Collateral superficial veins (nonvaricose)  1  0   An alternative diagnosis is as likely (or more likely) than DVT (e.g., cellulitis, postoperative swelling, calf strain)  -2  -2   Total Points   -2     Key  · -2 to 0 Low probability of DVT 3% (95% confidence interval [CI] 1.7%-5.9%)  · 1 to 2 Moderate probability of DVT 17% (95% confidence interval [CI] 12%-23%)  · 3 or more High probability of DVT 75% (95% confidence interval [CI] 63%-84%)    Medical consultation is advised in the presence of low probability  Medical referral is required with moderate or high score. Kalyan PS, Richi DR, Martínez J, et al: Value of assessment of pretest probability of deep-vein thrombosis in clinical management, Lancet 373:4250-9481, 1997. RESTRICTIONS/PRECAUTIONS: HTN managed with medications; OA; powder gloves causes itchiness and breaking out.  R TKA in 2018.      Exercises/Interventions:     Exercise/Equipment Resistance/Repititions Other comments   Stretching Hamstring 5x:30    Hip Flexion     ITB- Rope     Grion     Quad     Inclined Calf 5x:30    Towel Pull     Piriformis                    SLR     Supine 2 x 10  Quad lag   Prone     Abduction 2 x 10     Adducton     SLR+          ws     Seated march 3x10    Clams                Isometrics     Quad sets 10x:10 propped   Ball Squeezes 10x:10    Patellar Glides 8' with scar massage focusing on superior scar with over pressure 3x:20 in ext Propped for part of it   Medial 2'    Superior 2'    Inferior 2'         ROM     Passive     Active     Weight Shift     Hang Weights     Sheet Pulls 10x:20    Ankle Pumps     EOB ROM 10x:20    CKC     Calf raises 3x10    Wall sits     Step ups     1 leg stand     Squatting 2 x 10 with cushion Sit to stand from chair   CC TKE     Balance     Monster Walks     Bridging     Triple threats     Stool Scoots     PRE     Extension  RANGE:   Flexion  RANGE:        Cable Column          Leg Press  RANGE:        Bike 10' ROM    Treadmill            Access Code: Emilyrosie Morales  URL: [email or web address]  Date: 11/15/2021  Prepared by: Natty Walker    Exercises  Long Sitting Calf Stretch with Strap - 2 x daily - 7 x weekly - 3-5 sets - 30 hold  Seated Table Hamstring Stretch - 2 x daily - 7 x weekly - 3-5 sets - 30 hold  Supine Quadricep Sets - 2-10 x daily - 7 x weekly - 5-10 reps - 5-10 hold  Supine Ankle Pumps - 10 x daily - 7 x weekly - 3 sets - 10 reps  Supine Hip Adduction Isometric with Ball - 2 x daily - 7 x weekly - 10 reps - 5-10 hold  Supine Active Straight Leg Raise - 1-2 x daily - 7 x weekly - 1-3 sets - 5-10 reps  Hooklying Clamshell with Resistance - 2 x daily - 7 x weekly - 1-3 sets - 10 reps  Supine Heel Slide with Strap - 2 x daily - 7 x weekly - 5-10 sets - 5-10 hold             Therapeutic Exercise and NMR EXR  [x]  (88261) Provided verbal/tactile cueing for activities related to strengthening, flexibility, endurance, ROM for improvements in LE, proximal hip, and core control with self care, mobility, lifting, ambulation. [x]  (31967) Provided verbal/tactile cueing for activities related to improving balance, coordination, kinesthetic sense, posture, motor skill, proprioception  to assist with LE, proximal hip, and core control in self care, mobility, lifting, ambulation and eccentric single leg control. NMR and Therapeutic Activities:    [x]  (16703 or 28232) Provided verbal/tactile cueing for activities related to improving balance, coordination, kinesthetic sense, posture, motor skill, proprioception and motor activation to allow for proper function of core, proximal hip and LE with self care and ADLs  [x]  (98472) Gait Re-education- Provided training and instruction to the patient for proper LE, core and proximal hip recruitment and positioning and eccentric body weight control with ambulation re-education including up and down stairs     Home Exercise Program:    [x]  (23636) Reviewed/Progressed HEP activities related to strengthening, flexibility, endurance, ROM of core, proximal hip and LE for functional self-care, mobility, lifting and ambulation/stair navigation   [x]  (95277)Reviewed/Progressed HEP activities related to improving balance, coordination, kinesthetic sense, posture, motor skill, proprioception of core, proximal hip and LE for self care, mobility, lifting, and ambulation/stair navigation      Manual Treatments:  PROM / STM / Oscillations-Mobs:  G-I, II, III, IV (PA's, Inf., Post.)  [x]  (45775) Provided manual therapy to mobilize LE, proximal hip and/or LS spine soft tissue/joints for the purpose of modulating pain, promoting relaxation,  increasing ROM, reducing/eliminating soft tissue swelling/inflammation/restriction, improving soft tissue extensibility and allowing for proper ROM for normal function with self care, mobility, lifting and ambulation. Modalities: ice 10' top and bottom.   Propped for part of tx with intermittent breaks    Charges: Timed Code Treatment Minutes: 61'   Total Treatment Minutes: 80'     []  EVAL (LOW) 455 [id number]   []  EVAL (MOD) 26761   []  EVAL (HIGH) 56242   []  RE-EVAL   [x]  GN(30697) x1    []  IONTO  []  NMR (07920) x     []  VASO  [x]  Manual (28431) x 1     []  Other:  [x]  TA x 2     []  [employer] (91620)  []  ES(attended) (11927)      []  ES (un) (15154):       GOALS:   Patient stated goal: decrease pain    []? ? Progressing: []?? Met: []?? Not Met: []?? Adjusted     Therapist goals for Patient:   Short Term Goals: To be achieved in: 2 weeks  1. Independent in HEP and progression per patient tolerance, in order to prevent re-injury.     []? ? Progressing: []?? Met: []?? Not Met: []?? Adjusted   2. Pt will report pain at worst less than or equal to 8/10.  []?? Progressing: []?? Met: []?? Not Met: []?? Adjusted     Long Term Goals: To be achieved in: 16 weeks  1. Pt will demo a score 40% or better for the WOMAC to assist with reaching prior level of function.   []?? Progressing: []?? Met: []?? Not Met: []?? Adjusted  2. Patient will demonstrate increased AROM to knee ext to equal to 0 and knee flex greater than or equal to 110 to allow for proper joint functioning as indicated by patients Functional Deficits.    []? ? Progressing: []?? Met: []?? Not Met: []?? Adjusted  3. Patient will demonstrate an increase in strength to hip flex, hip ABD, and knee flex and ext strength 4 to allow for proper functional mobility as indicated by patients functional deficits. []?? Progressing: []?? Met: []?? Not Met: []?? Adjusted  4. Patient will return to performing all transfers with mod I with pain less than or equal to 3/10 in order to be independent in home mobility.  []?? Progressing: []?? Met: []?? Not Met: []?? Adjusted  5. Pt will report pain at worst less than or equal to 5/10.         []? ? Progressing: []?? Met: []?? Not Met: []?? Adjusted       Overall Progression Towards Functional goals/ Treatment Progress Update:  [] Patient is progressing as expected towards functional goals listed. [] Progression is slowed due to complexities/Impairments listed. [] Progression has been slowed due to co-morbidities. [x] Plan just implemented, too soon to assess goals progression <30days   [] Goals require adjustment due to lack of progress  [] Patient is not progressing as expected and requires additional follow up with physician  [] Other    Prognosis for POC: [x] Good [] Fair  [] Poor      Patient requires continued skilled intervention: [x] Yes  [] No    Treatment/Activity Tolerance:  [x] Patient able to complete treatment  [x] Patient limited by fatigue  [x] Patient limited by pain     [] Patient limited by other medical complications  [] Other: Pt bhumika tx fair. She continues to struggle with extension. Her flexion does appear to be progressing. We did go over a variety of devices that she could use at home to help work on extension. Pt states she has been working on extension and her HEP, but she does require cuing and assistance to remember her exercises and perform them properly. I did discuss a ROM device with Dr. Morteza Waters staff Warren Desai). I did start the process of obtaining a ROM device for her. Pt would continue to benefit from skilled PT to improve ROM, strength, pain, and function. PLAN: If pt doesn't return, this note can be considered a D/C note. Consider prone hangs vs time propped.     [x] Continue per plan of care [] Alter current plan (see comments above)  [] Plan of care initiated [] Hold pending MD visit [] Discharge    Electronically signed by: Josefa Wahl, JULIOT  [id number]

## 2021-12-09 ENCOUNTER — TELEPHONE (OUTPATIENT)
Dept: ORTHOPEDIC SURGERY | Age: 56
End: 2021-12-09

## 2021-12-09 DIAGNOSIS — Z96.651 STATUS POST TOTAL RIGHT KNEE REPLACEMENT: Primary | ICD-10-CM

## 2021-12-09 RX ORDER — OXYCODONE HYDROCHLORIDE 5 MG/1
5 TABLET ORAL EVERY 12 HOURS PRN
Qty: 10 TABLET | Refills: 0 | Status: SHIPPED | OUTPATIENT
Start: 2021-12-09 | End: 2021-12-14

## 2021-12-09 NOTE — TELEPHONE ENCOUNTER
Prescription Refill     Medication Name:  OxyContin   Pharmacy: [facility] on [facility]  Patient Contact Number:  [phone]

## 2021-12-09 NOTE — TELEPHONE ENCOUNTER
The patient was aware that Dr. Jude Sheffield is out of the office and I will ask another physician about her refill of Oxycodone. She had a L TKA on 10/11/21.

## 2021-12-13 ENCOUNTER — HOSPITAL ENCOUNTER (OUTPATIENT)
Dept: PHYSICAL THERAPY | Age: 56
Setting detail: THERAPIES SERIES
Discharge: HOME | End: 2021-12-13
Payer: COMMERCIAL

## 2021-12-13 PROCEDURE — 97110 THERAPEUTIC EXERCISES: CPT | Performed by: PHYSICAL THERAPIST

## 2021-12-13 PROCEDURE — 97530 THERAPEUTIC ACTIVITIES: CPT | Performed by: PHYSICAL THERAPIST

## 2021-12-13 PROCEDURE — 97140 MANUAL THERAPY 1/> REGIONS: CPT | Performed by: PHYSICAL THERAPIST

## 2021-12-13 NOTE — PLAN OF CARE
[facility] a Department of [address], [address]  Office: [phone]  Fax:  [phone]                                                                  Physical Therapy Treatment Note/ Progress Report:              Physical Therapy Re-Certification Plan of Care    Dear Dr. Benigno Chin,    We had the pleasure of treating the following patient for physical therapy services at [address] [place] [address]. A summary of our findings can be found in the updated assessment below. This includes our plan of care. If you have any questions or concerns regarding these findings, please do not hesitate to contact me at the office phone number checked above. Thank you for the referral.     Physician Signature:________________________________Date:__________________  By signing above (or electronic signature), therapists plan is approved by physician    Date Range Of Visits: 8/27/21-12/13/2021  Total Visits to Date: 10  Overall Response to Treatment:   [] Patient is responding well to treatment and improvement is noted with regards to goals   [] Patient should continue to improve in reasonable time if they continue HEP   [] Patient has plateaued and is no longer responding to skilled PT intervention    [] Patient is getting worse and would benefit from return to referring MD   [] Patient unable to adhere to initial POC   [x] Other: Pt bhumika tx fair. Pt did arrive 13' late, which lead to abbreviating her tx. She was able to achieve full knee ext with max OP by PT. She does not bhumika ext well. She has not heard about her ERMI device yet. She did demo less flexion today, which may be due to focusing more today on her ext. Pt does need to be reminded t/o of her counts, reps, and form.   Pt demo significant LE weakness, and requires multiple breaks t/o tx due to tightness/stiffness and decreased endurance. Pt would continue to benefit from skilled PT to improve ROM, strength, pain, and function. Recommend continuing tx 2x/wk x8-12 wks. Date:  2021    Patient Name:  Brittani Weiss    :  1965  MRN: [id number]  Restrictions/Precautions:    Medical/Treatment Diagnosis Information:  · Diagnosis: M17.12 (ICD-10-CM) - Primary osteoarthritis of left knee; Z96.651 (ICD-10-CM) - Status post total right knee replacement. Surgery on 10/11/21  · Treatment Diagnosis: M25.562 - left knee pain  Insurance/Certification information:  PT Insurance Information: R  Physician Information:  Referring Practitioner: Giovanni Bauer  Has the plan of care been signed (Y/N):        []  Yes  [x]  No     Date of Patient follow up with Physician:       Is this a Progress Report:     []  Yes  [x]  No        If Yes:  Date Range for reporting period:  Beginning 21  Ending 21    Progress report will be due (10 Rx or 30 days whichever is less): visit 25 or 10 Aaron 22          Visit # Insurance Allowable Auth Required   10 (+ approx 8 visits elsewhere) 30 []  Yes [x]  No        Functional Scale: WOMAC-77.8%   Date assessed: 2021    Latex Allergy:  [x]NO      []YES  Preferred Language for Healthcare:   [x]English       []other:      Pain level:  4-5/10    SUBJECTIVE:  Pt walked to the clinic today. She feels this is needed as she will need to be on her feet more to return to work. She took some tylenol this morning. She states she worked on straightening her leg 2-3 x/d for 39' to maybe an hour.       OBJECTIVE: 21    Observation:     Test measurements:      Flexibility L R Comment   Hamstring      Gastroc      ITB      Quad                ROM PROM AROM Overpressure Comment    L R L R L R    Flexion 103 sheet pulls         Extension     0 with max overpressure                             Strength L R Comment   Quad  3+    Hamstring  3+ Gastroc      Hip flexor  3+    Hip ABD  3+                    Special Test Results/Comment   Meniscal Click    Crepitus    Flexion Test    Valgus Laxity    Varus Laxity    Lachmans    Drop Back    Homans            Girth L R   Mid Patella     Suprapatellar     5cm above     15cm above       Kalyan Clinical Decision Rule for DVT    Clinical Presentation  Possible Score  Clients Score    Active cancer (within 6 months of diagnosis or receiving palliative care)  1  0   Paralysis, paresis, or recent immobilization of lower extremity  1  0   Bedridden for more than 3 days or major surgery in the last 4 weeks  1  0   Localized tenderness in the center of the posterior calf, the popliteal space, or along the femoral vein in the anterior thigh/groin  1  0   Entire lower extremity swelling  1  0   Unilateral calf swelling (more than 3 cm larger than uninvolved side)  1  0   Unilateral pitting edema  1  0   Collateral superficial veins (nonvaricose)  1  0   An alternative diagnosis is as likely (or more likely) than DVT (e.g., cellulitis, postoperative swelling, calf strain)  -2  -2   Total Points   -2     Key  · -2 to 0 Low probability of DVT 3% (95% confidence interval [CI] 1.7%-5.9%)  · 1 to 2 Moderate probability of DVT 17% (95% confidence interval [CI] 12%-23%)  · 3 or more High probability of DVT 75% (95% confidence interval [CI] 63%-84%)    Medical consultation is advised in the presence of low probability  Medical referral is required with moderate or high score. Kalyan PS, Richi LARIOS, Martínez J, et al: Value of assessment of pretest probability of deep-vein thrombosis in clinical management, Lancet 270:7428-8858, 1997. RESTRICTIONS/PRECAUTIONS: HTN managed with medications; OA; powder gloves causes itchiness and breaking out.  R TKA in 2018.      Exercises/Interventions:     Exercise/Equipment Resistance/Repititions Other comments   Stretching     Hamstring 5x:30    Hip Flexion     ITB- Rope     Grion Quad     Inclined Calf 5x:30    Towel Pull     Piriformis                    SLR     Supine 2 x 10  Quad lag   Prone     Abduction 2 x 10     Adducton     SLR+          ws     Seated march     [employer]                Isometrics     [employer] sets 10x:10 propped   [place] Squeezes 10x:10    Patellar Glides 8' with scar massage focusing on superior scar with over pressure 3x:20 in ext Propped for part of it   Medial 2'    Superior 2'    Inferior 2'         ROM     Passive     Active     Weight Shift     Hang Weights     Sheet Pulls 10x:20    Ankle Pumps     EOB ROM     CKC     Calf raises    Wall sits    Step ups    1 leg stand    Squatting Sit to stand from chair   CC TKE     Balance     Monster Walks     Bridging     Triple threats     Stool Scoots     PRE     Extension  RANGE:   Flexion  RANGE:        Cable Column          Leg Press  RANGE:        Bike 8' ROM    Treadmill            Access Code: Isa [email or web address]  URL: [email or web address]  Date: 11/15/2021  Prepared by: Holli Walker    Exercises  Long Sitting Calf Stretch with Strap - 2 x daily - 7 x weekly - 3-5 sets - 30 hold  Seated Table Hamstring Stretch - 2 x daily - 7 x weekly - 3-5 sets - 30 hold  Supine Quadricep Sets - 2-10 x daily - 7 x weekly - 5-10 reps - 5-10 hold  Supine Ankle Pumps - 10 x daily - 7 x weekly - 3 sets - 10 reps  Supine Hip Adduction Isometric with Ball - 2 x daily - 7 x weekly - 10 reps - 5-10 hold  Supine Active Straight Leg Raise - 1-2 x daily - 7 x weekly - 1-3 sets - 5-10 reps  Hooklying Clamshell with Resistance - 2 x daily - 7 x weekly - 1-3 sets - 10 reps  Supine Heel Slide with Strap - 2 x daily - 7 x weekly - 5-10 sets - 5-10 hold             Therapeutic Exercise and NMR EXR  [x]  (01220) Provided verbal/tactile cueing for activities related to strengthening, flexibility, endurance, ROM for improvements in LE, proximal hip, and core control with self care, mobility, lifting, ambulation.   [x]  (27463) Provided verbal/tactile cueing for activities related to improving balance, coordination, kinesthetic sense, posture, motor skill, proprioception  to assist with LE, proximal hip, and core control in self care, mobility, lifting, ambulation and eccentric single leg control. NMR and Therapeutic Activities:    [x]  (12809 or 88258) Provided verbal/tactile cueing for activities related to improving balance, coordination, kinesthetic sense, posture, motor skill, proprioception and motor activation to allow for proper function of core, proximal hip and LE with self care and ADLs  [x]  (75386) Gait Re-education- Provided training and instruction to the patient for proper LE, core and proximal hip recruitment and positioning and eccentric body weight control with ambulation re-education including up and down stairs     Home Exercise Program:    [x]  (25612) Reviewed/Progressed HEP activities related to strengthening, flexibility, endurance, ROM of core, proximal hip and LE for functional self-care, mobility, lifting and ambulation/stair navigation   [x]  (30554)Reviewed/Progressed HEP activities related to improving balance, coordination, kinesthetic sense, posture, motor skill, proprioception of core, proximal hip and LE for self care, mobility, lifting, and ambulation/stair navigation      Manual Treatments:  PROM / STM / Oscillations-Mobs:  G-I, II, III, IV (PA's, Inf., Post.)  [x]  (99543) Provided manual therapy to mobilize LE, proximal hip and/or LS spine soft tissue/joints for the purpose of modulating pain, promoting relaxation,  increasing ROM, reducing/eliminating soft tissue swelling/inflammation/restriction, improving soft tissue extensibility and allowing for proper ROM for normal function with self care, mobility, lifting and ambulation. Modalities: ice 15' top and bottom.   Propped for part of tx with intermittent breaks    Charges:   Timed Code Treatment Minutes: 39'   Total Treatment Minutes: 72'     []  IONA (LOW) 03011 []  EVAL (MOD) 26166   []  EVAL (HIGH) 93555   []  RE-EVAL   [x]  HJ(34212) x1    []  IONTO  []  NMR (52217) x     []  VASO  [x]  Manual (29814) x 1     []  Other:  [x]  TA x 1     []  [employer] (46336)  []  ES(attended) (17404)      []  ES (un) (05022):       GOALS:   Patient stated goal: decrease pain    []? ? Progressing: []?? Met: []?? Not Met: []?? Adjusted     Therapist goals for Patient:   Short Term Goals: To be achieved in: 2 weeks  1. Independent in HEP and progression per patient tolerance, in order to prevent re-injury.     []? ? Progressing: []?? Met: []?? Not Met: []?? Adjusted   2. Pt will report pain at worst less than or equal to 8/10.  []?? Progressing: []?? Met: []?? Not Met: []?? Adjusted     Long Term Goals: To be achieved in: 16 weeks  1. Pt will demo a score 40% or better for the WOMAC to assist with reaching prior level of function.   []?? Progressing: []?? Met: []?? Not Met: []?? Adjusted  2. Patient will demonstrate increased AROM to knee ext to equal to 0 and knee flex greater than or equal to 110 to allow for proper joint functioning as indicated by patients Functional Deficits.    []? ? Progressing: []?? Met: []?? Not Met: []?? Adjusted  3. Patient will demonstrate an increase in strength to hip flex, hip ABD, and knee flex and ext strength 4 to allow for proper functional mobility as indicated by patients functional deficits. []?? Progressing: []?? Met: []?? Not Met: []?? Adjusted  4. Patient will return to performing all transfers with mod I with pain less than or equal to 3/10 in order to be independent in home mobility.  []?? Progressing: []?? Met: []?? Not Met: []?? Adjusted  5. Pt will report pain at worst less than or equal to 5/10.         []? ? Progressing: []?? Met: []?? Not Met: []?? Adjusted       Overall Progression Towards Functional goals/ Treatment Progress Update:  [] Patient is progressing as expected towards functional goals listed.     [] Progression is slowed due to complexities/Impairments listed. [] Progression has been slowed due to co-morbidities. [x] Plan just implemented, too soon to assess goals progression <30days   [] Goals require adjustment due to lack of progress  [] Patient is not progressing as expected and requires additional follow up with physician  [] Other    Prognosis for POC: [x] Good [] Fair  [] Poor      Patient requires continued skilled intervention: [x] Yes  [] No    Treatment/Activity Tolerance:  [x] Patient able to complete treatment  [x] Patient limited by fatigue  [x] Patient limited by pain     [] Patient limited by other medical complications  [] Other: Pt bhumika tx fair. Pt did arrive 13' late, which lead to abbreviating her tx. She was able to achieve full knee ext with max OP by PT. She does not bhumika ext well. She has not heard about her ERMI device yet. She did demo less flexion today, which may be due to focusing more today on her ext. Pt does need to be reminded t/o of her counts, reps, and form. Pt demo significant LE weakness, and requires multiple breaks t/o tx due to tightness/stiffness and decreased endurance. Pt would continue to benefit from skilled PT to improve ROM, strength, pain, and function. Recommend continuing tx 2x/wk x8-12 wks. PLAN: If pt doesn't return, this note can be considered a D/C note. Add in functional activities again NV.   [x] Continue per plan of care [] Alter current plan (see comments above)  [] Plan of care initiated [] Hold pending MD visit [] Discharge    Electronically signed by: Olinda Stephen, DAMIR  [id number]

## 2021-12-15 ENCOUNTER — HOSPITAL ENCOUNTER (OUTPATIENT)
Dept: PHYSICAL THERAPY | Age: 56
Setting detail: THERAPIES SERIES
Discharge: HOME | End: 2021-12-15
Payer: COMMERCIAL

## 2021-12-15 PROCEDURE — 97530 THERAPEUTIC ACTIVITIES: CPT | Performed by: PHYSICAL THERAPIST

## 2021-12-15 PROCEDURE — 97140 MANUAL THERAPY 1/> REGIONS: CPT | Performed by: PHYSICAL THERAPIST

## 2021-12-15 PROCEDURE — 97110 THERAPEUTIC EXERCISES: CPT | Performed by: PHYSICAL THERAPIST

## 2021-12-15 NOTE — FLOWSHEET NOTE
100 [facility] a Department of [address]  [address] [place] [address], [address]  Office: [phone]  Fax:  [phone]                                                                  Physical Therapy Treatment Note/ Progress Report:             Date:  12/15/2021    Patient Name:  Preston Iverson    :  1965  MRN: [id number]  Restrictions/Precautions:    Medical/Treatment Diagnosis Information:  · Diagnosis: M17.12 (ICD-10-CM) - Primary osteoarthritis of left knee; Z96.651 (ICD-10-CM) - Status post total right knee replacement. Surgery on 10/11/21  · Treatment Diagnosis: M25.562 - left knee pain  Insurance/Certification information:  PT Insurance Information: R  Physician Information:  Referring Practitioner: James Mehta  Has the plan of care been signed (Y/N):        []  Yes  [x]  No     Date of Patient follow up with Physician:       Is this a Progress Report:     []  Yes  [x]  No        If Yes:  Date Range for reporting period:  Beginning 21  Ending 21    Progress report will be due (10 Rx or 30 days whichever is less): visit 25 or 10 Aaron 22          Visit # Insurance Allowable Auth Required   11 (+ approx 8 visits elsewhere) 30 []  Yes [x]  No        Functional Scale: WOMAC-77.8%   Date assessed: 2021    Latex Allergy:  [x]NO      []YES  Preferred Language for Healthcare:   [x]English       []other:      Pain level:  4/10    SUBJECTIVE:  Her ERMI was denied by her insurance. She would have to pay $800 for it. The MD told her to just work on her stretches at home. She states she has been working on her HEP and straightening her leg, but it does hurt. Her back is sore. She is unsure if this is from walking more or from the weight she has put on as she has been more sedentary.      OBJECTIVE: 12/15/21    Observation:     Test measurements: Flexibility L R Comment   Hamstring      Gastroc      ITB      Quad                ROM PROM AROM Overpressure Comment    L R L R L R    Flexion 107 sheet pulls         Extension   lacking 10                               Strength L R Comment   Quad      Hamstring      Gastroc      Hip flexor      Hip ABD                      Special Test Results/Comment   Meniscal Click    Crepitus    Flexion Test    Valgus Laxity    Varus Laxity    Lachmans    Drop Back    Homans            Girth L R   Mid Patella     Suprapatellar     5cm above     15cm above       Kalyan Clinical Decision Rule for DVT    Clinical Presentation  Possible Score  Clients Score    Active cancer (within 6 months of diagnosis or receiving palliative care)  1  0   Paralysis, paresis, or recent immobilization of lower extremity  1  0   Bedridden for more than 3 days or major surgery in the last 4 weeks  1  0   Localized tenderness in the center of the posterior calf, the popliteal space, or along the femoral vein in the anterior thigh/groin  1  0   Entire lower extremity swelling  1  0   Unilateral calf swelling (more than 3 cm larger than uninvolved side)  1  0   Unilateral pitting edema  1  0   Collateral superficial veins (nonvaricose)  1  0   An alternative diagnosis is as likely (or more likely) than DVT (e.g., cellulitis, postoperative swelling, calf strain)  -2  -2   Total Points   -2     Key  · -2 to 0 Low probability of DVT 3% (95% confidence interval [CI] 1.7%-5.9%)  · 1 to 2 Moderate probability of DVT 17% (95% confidence interval [CI] 12%-23%)  · 3 or more High probability of DVT 75% (95% confidence interval [CI] 63%-84%)    Medical consultation is advised in the presence of low probability  Medical referral is required with moderate or high score.       Kalyan PS, Richi LARIOS, Martínez J, et al: Value of assessment of pretest probability of deep-vein thrombosis in clinical management, Lancet 957:2991-2366, 1997.      RESTRICTIONS/PRECAUTIONS: HTN managed with medications; OA; powder gloves causes itchiness and breaking out.  R TKA in 2018. Exercises/Interventions:     Exercise/Equipment Resistance/Repititions Other comments   Stretching     Hamstring 5x:30    Hip Flexion     ITB- Rope     Grion     Quad     Inclined Calf 5x:30    Towel Pull     Piriformis                    SLR     Supine 2 x 10  Quad lag   Prone     Abduction 2 x 10     Adducton     SLR+          ws     Seated march     Flowdocks     [employer] sets 10x:10 propped   [employer] Squeezes 10x:10    Patellar Glides 8' with scar massage focusing on superior scar with over pressure 3x:20 in ext Propped for part of it   Medial 2'    Superior 2'    Inferior 2'         ROM     Passive     Active     Weight Shift     Hang Weights     Sheet Pulls 10x:10    Ankle Pumps     EOB ROM     CKC     Calf raises    Wall sits    Step ups    1 leg stand    Squatting 2 x 10 with cushionSit to stand from chair   CC TKE     Balance     Monster Walks     Bridging     Triple threats     Stool Scoots     PRE     Extension  RANGE:   Flexion  RANGE:        Cable Column          Leg Press  RANGE:        Bike 8' ROM    Treadmill            Access Code: Amanuel Reveal  URL: [email or web address]  Date: 11/15/2021  Prepared by: Wil Walker    Exercises  Long Sitting Calf Stretch with Strap - 2 x daily - 7 x weekly - 3-5 sets - 30 hold  Seated Table Hamstring Stretch - 2 x daily - 7 x weekly - 3-5 sets - 30 hold  Supine Quadricep Sets - 2-10 x daily - 7 x weekly - 5-10 reps - 5-10 hold  Supine Ankle Pumps - 10 x daily - 7 x weekly - 3 sets - 10 reps  Supine Hip Adduction Isometric with Ball - 2 x daily - 7 x weekly - 10 reps - 5-10 hold  Supine Active Straight Leg Raise - 1-2 x daily - 7 x weekly - 1-3 sets - 5-10 reps  Hooklying Clamshell with Resistance - 2 x daily - 7 x weekly - 1-3 sets - 10 reps  Supine Heel Slide with Strap - 2 x daily - 7 x weekly - 5-10 sets - 5-10 hold             Therapeutic Exercise and NMR EXR  [x]  (39228) Provided verbal/tactile cueing for activities related to strengthening, flexibility, endurance, ROM for improvements in LE, proximal hip, and core control with self care, mobility, lifting, ambulation. [x]  (64219) Provided verbal/tactile cueing for activities related to improving balance, coordination, kinesthetic sense, posture, motor skill, proprioception  to assist with LE, proximal hip, and core control in self care, mobility, lifting, ambulation and eccentric single leg control.      NMR and Therapeutic Activities:    [x]  (74694 or 40010) Provided verbal/tactile cueing for activities related to improving balance, coordination, kinesthetic sense, posture, motor skill, proprioception and motor activation to allow for proper function of core, proximal hip and LE with self care and ADLs  [x]  (99369) Gait Re-education- Provided training and instruction to the patient for proper LE, core and proximal hip recruitment and positioning and eccentric body weight control with ambulation re-education including up and down stairs     Home Exercise Program:    [x]  (71955) Reviewed/Progressed HEP activities related to strengthening, flexibility, endurance, ROM of core, proximal hip and LE for functional self-care, mobility, lifting and ambulation/stair navigation   [x]  (00608)Reviewed/Progressed HEP activities related to improving balance, coordination, kinesthetic sense, posture, motor skill, proprioception of core, proximal hip and LE for self care, mobility, lifting, and ambulation/stair navigation      Manual Treatments:  PROM / STM / Oscillations-Mobs:  G-I, II, III, IV (PA's, Inf., Post.)  [x]  (24666) Provided manual therapy to mobilize LE, proximal hip and/or LS spine soft tissue/joints for the purpose of modulating pain, promoting relaxation,  increasing ROM, reducing/eliminating soft tissue swelling/inflammation/restriction, improving soft tissue extensibility and allowing for proper ROM for normal function with self care, mobility, lifting and ambulation. Modalities: ice 10' top and bottom. Propped for part of tx with intermittent breaks. Approx 7' with prop    Charges:   Timed Code Treatment Minutes: 47'   Total Treatment Minutes: 79'     []  EVAL (LOW) 455 1011   []  EVAL (MOD) 31845   []  EVAL (HIGH) 59436   []  RE-EVAL   [x]  SI(86018) x2    []  IONTO  []  NMR (40180) x     []  VASO  [x]  Manual (50943) x 1     []  Other:  [x]  TA x 1     []  Mech Traction (25107)  []  ES(attended) (03492)      []  ES (un) (47809):       GOALS:   Patient stated goal: decrease pain    []? ? Progressing: []?? Met: []?? Not Met: []?? Adjusted     Therapist goals for Patient:   Short Term Goals: To be achieved in: 2 weeks  1. Independent in HEP and progression per patient tolerance, in order to prevent re-injury.     []? ? Progressing: []?? Met: []?? Not Met: []?? Adjusted   2. Pt will report pain at worst less than or equal to 8/10.  []?? Progressing: []?? Met: []?? Not Met: []?? Adjusted     Long Term Goals: To be achieved in: 16 weeks  1. Pt will demo a score 40% or better for the WOMAC to assist with reaching prior level of function.   []?? Progressing: []?? Met: []?? Not Met: []?? Adjusted  2. Patient will demonstrate increased AROM to knee ext to equal to 0 and knee flex greater than or equal to 110 to allow for proper joint functioning as indicated by patients Functional Deficits.    []? ? Progressing: []?? Met: []?? Not Met: []?? Adjusted  3. Patient will demonstrate an increase in strength to hip flex, hip ABD, and knee flex and ext strength 4 to allow for proper functional mobility as indicated by patients functional deficits. []?? Progressing: []?? Met: []?? Not Met: []?? Adjusted  4.  Patient will return to performing all transfers with mod I with pain less than or equal to 3/10 in order to be independent in home mobility.  []?? Progressing: []?? Met: []?? Not Met: []?? Adjusted  5. Pt will report pain at worst less than or equal to 5/10.         []? ? Progressing: []?? Met: []?? Not Met: []?? Adjusted       Overall Progression Towards Functional goals/ Treatment Progress Update:  [] Patient is progressing as expected towards functional goals listed. [] Progression is slowed due to complexities/Impairments listed. [] Progression has been slowed due to co-morbidities. [x] Plan just implemented, too soon to assess goals progression <30days   [] Goals require adjustment due to lack of progress  [] Patient is not progressing as expected and requires additional follow up with physician  [] Other    Prognosis for POC: [x] Good [] Fair  [] Poor      Patient requires continued skilled intervention: [x] Yes  [] No    Treatment/Activity Tolerance:  [x] Patient able to complete treatment  [x] Patient limited by fatigue  [x] Patient limited by pain     [] Patient limited by other medical complications  [] Other: Pt bhumika tx fair. She requires rests t/o tx due to fair endurance. She continues to struggle with extension. Her insurance did not cover the ERMI. I will look into getting a different device for her (dynasplint). Pt would benefit from this device to obtain full motion focusing on full extension, particularly as she has struggled with this since starting outpatient tx. Pt does require mod to max cuing t/o for her exercises. Pt has been working on her walking, as she walks from her home to the clinic. She does state her back has been more sore, which may be from walking more. Pt would continue to benefit from skilled PT to improve ROM, strength, pain, and function. PLAN: If pt doesn't return, this note can be considered a D/C note. Consider seated marches again.    [x] Continue per plan of care [] Alter current plan (see comments above)  [] Plan of care initiated [] Hold pending MD visit [] Discharge    Electronically signed by: [facility] Blossom Mancera, PT, DPT  [id number]

## 2021-12-20 ENCOUNTER — HOSPITAL ENCOUNTER (OUTPATIENT)
Dept: PHYSICAL THERAPY | Age: 56
Setting detail: THERAPIES SERIES
Discharge: HOME | End: 2021-12-20
Payer: COMMERCIAL

## 2021-12-20 PROCEDURE — 97140 MANUAL THERAPY 1/> REGIONS: CPT | Performed by: PHYSICAL THERAPIST

## 2021-12-20 PROCEDURE — 97530 THERAPEUTIC ACTIVITIES: CPT | Performed by: PHYSICAL THERAPIST

## 2021-12-20 PROCEDURE — 97110 THERAPEUTIC EXERCISES: CPT | Performed by: PHYSICAL THERAPIST

## 2021-12-20 NOTE — FLOWSHEET NOTE
100 [facility] Performance and Rehabilitation a Department of [address]  [address], [address]  Office: [phone]  Fax:  [phone]                                                                  Physical Therapy Treatment Note/ Progress Report:             Date:  2021    Patient Name:  Deborah Goldman    :  1965  MRN: [id number]  Restrictions/Precautions:    Medical/Treatment Diagnosis Information:  · Diagnosis: M17.12 (ICD-10-CM) - Primary osteoarthritis of left knee; Z96.651 (ICD-10-CM) - Status post total right knee replacement. Surgery on 10/11/21  · Treatment Diagnosis: M25.562 - left knee pain  Insurance/Certification information:  PT Insurance Information: UMR  Physician Information:  Referring Practitioner: Silvana Mccarthy  Has the plan of care been signed (Y/N):        []  Yes  [x]  No     Date of Patient follow up with Physician:       Is this a Progress Report:     []  Yes  [x]  No        If Yes:  Date Range for reporting period:  Beginning 21  Ending 21    Progress report will be due (10 Rx or 30 days whichever is less): visit 25 or 10 Aaron 22          Visit # Insurance Allowable Auth Required   12 (+ approx 8 visits elsewhere) 30 []  Yes [x]  No        Functional Scale: WOMAC-77.8%   Date assessed: 2021    Latex Allergy:  [x]NO      []YES  Preferred Language for Healthcare:   [x]English       []other:      Pain level:  3-4/10    SUBJECTIVE:  She was sore after all the rain we got. She has been working on her exercises at home.        OBJECTIVE: 21    Observation:     Test measurements:      Flexibility L R Comment   Hamstring      Gastroc      ITB      Quad                ROM PROM AROM Overpressure Comment    L R L R L R    Flexion 105 sheet pulls         Extension     0 with max overpressure                             Strength L R Comment ITB- Rope     Grion     Quad     Inclined Calf 5x:30    Towel Pull     Piriformis                    SLR     Supine 3x 10  Quad lag   Prone     Abduction 3x 10     Adducton     SLR+          ws     Seated march     Clams                Isometrics     [employer] Squeezes 10x:10    Patellar Glides 8' with scar massage focusing on superior scar with over pressure 3x:20 in ext Propped for most of it   Medial 2'    Superior 2'    Inferior 2'         ROM     Passive     Active     Weight Shift     Hang Weights     Sheet Pulls 10x:20    Ankle Pumps     EOB ROM 10x:20    CKC     Calf raises 3x10   Wall sits    Step ups    1 leg stand Tandem stance 3x:10 each wayClose Sup A   Squatting 2 x 10 with cushionSit to stand from chair   CC TKE 3x10 15#    Balance     Monster Walks     Bridging     Triple threats     Stool Scoots     PRE     Extension  RANGE:   Flexion  RANGE:        Cable Column          Leg Press  RANGE:        Bike 5' ROM    Treadmill            Access Code: Karyle Motts  URL: [email or web address]  Date: 11/15/2021  Prepared by: Shannan Walker    Exercises  Long Sitting Calf Stretch with Strap - 2 x daily - 7 x weekly - 3-5 sets - 30 hold  Seated Table Hamstring Stretch - 2 x daily - 7 x weekly - 3-5 sets - 30 hold  Supine Quadricep Sets - 2-10 x daily - 7 x weekly - 5-10 reps - 5-10 hold  Supine Ankle Pumps - 10 x daily - 7 x weekly - 3 sets - 10 reps  Supine Hip Adduction Isometric with Ball - 2 x daily - 7 x weekly - 10 reps - 5-10 hold  Supine Active Straight Leg Raise - 1-2 x daily - 7 x weekly - 1-3 sets - 5-10 reps  Hooklying Clamshell with Resistance - 2 x daily - 7 x weekly - 1-3 sets - 10 reps  Supine Heel Slide with Strap - 2 x daily - 7 x weekly - 5-10 sets - 5-10 hold             Therapeutic Exercise and NMR EXR  [x]  (71089) Provided verbal/tactile cueing for activities related to strengthening, flexibility, endurance, ROM for improvements in LE, proximal hip, and core control with self care, mobility, lifting, ambulation. [x]  (33010) Provided verbal/tactile cueing for activities related to improving balance, coordination, kinesthetic sense, posture, motor skill, proprioception  to assist with LE, proximal hip, and core control in self care, mobility, lifting, ambulation and eccentric single leg control. NMR and Therapeutic Activities:    [x]  (51006 or 57795) Provided verbal/tactile cueing for activities related to improving balance, coordination, kinesthetic sense, posture, motor skill, proprioception and motor activation to allow for proper function of core, proximal hip and LE with self care and ADLs  [x]  (12060) Gait Re-education- Provided training and instruction to the patient for proper LE, core and proximal hip recruitment and positioning and eccentric body weight control with ambulation re-education including up and down stairs     Home Exercise Program:    [x]  (96753) Reviewed/Progressed HEP activities related to strengthening, flexibility, endurance, ROM of core, proximal hip and LE for functional self-care, mobility, lifting and ambulation/stair navigation   [x]  (04838)Reviewed/Progressed HEP activities related to improving balance, coordination, kinesthetic sense, posture, motor skill, proprioception of core, proximal hip and LE for self care, mobility, lifting, and ambulation/stair navigation      Manual Treatments:  PROM / STM / Oscillations-Mobs:  G-I, II, III, IV (PA's, Inf., Post.)  [x]  (20413) Provided manual therapy to mobilize LE, proximal hip and/or LS spine soft tissue/joints for the purpose of modulating pain, promoting relaxation,  increasing ROM, reducing/eliminating soft tissue swelling/inflammation/restriction, improving soft tissue extensibility and allowing for proper ROM for normal function with self care, mobility, lifting and ambulation. Modalities: Not performed today due to pt arriving late to tx.     Charges:  Timed Code Treatment Minutes: 39' Total Treatment Minutes: 50'     []  EVAL (LOW) 77305   []  EVAL (MOD) 03468   []  EVAL (HIGH) 02743   []  RE-EVAL   [x]  HE(04141) x1    []  IONTO  []  NMR (54947) x     []  VASO  [x]  Manual (53226) x 1     []  Other:  [x]  TA x 1     []  Mech Traction (07125)  []  ES(attended) (88392)      []  ES (un) (91703):       GOALS:   Patient stated goal: decrease pain    []? ? Progressing: []?? Met: []?? Not Met: []?? Adjusted     Therapist goals for Patient:   Short Term Goals: To be achieved in: 2 weeks  1. Independent in HEP and progression per patient tolerance, in order to prevent re-injury.     []? ? Progressing: []?? Met: []?? Not Met: []?? Adjusted   2. Pt will report pain at worst less than or equal to 8/10.  []?? Progressing: []?? Met: []?? Not Met: []?? Adjusted     Long Term Goals: To be achieved in: 16 weeks  1. Pt will demo a score 40% or better for the WOMAC to assist with reaching prior level of function.   []?? Progressing: []?? Met: []?? Not Met: []?? Adjusted  2. Patient will demonstrate increased AROM to knee ext to equal to 0 and knee flex greater than or equal to 110 to allow for proper joint functioning as indicated by patients Functional Deficits.    []? ? Progressing: []?? Met: []?? Not Met: []?? Adjusted  3. Patient will demonstrate an increase in strength to hip flex, hip ABD, and knee flex and ext strength 4 to allow for proper functional mobility as indicated by patients functional deficits. []?? Progressing: []?? Met: []?? Not Met: []?? Adjusted  4. Patient will return to performing all transfers with mod I with pain less than or equal to 3/10 in order to be independent in home mobility.  []?? Progressing: []?? Met: []?? Not Met: []?? Adjusted  5. Pt will report pain at worst less than or equal to 5/10.         []? ? Progressing: []?? Met: []?? Not Met: []?? Adjusted       Overall Progression Towards Functional goals/ Treatment Progress Update:  [] Patient is progressing as expected towards functional goals listed. [] Progression is slowed due to complexities/Impairments listed. [] Progression has been slowed due to co-morbidities. [x] Plan just implemented, too soon to assess goals progression <30days   [] Goals require adjustment due to lack of progress  [] Patient is not progressing as expected and requires additional follow up with physician  [] Other    Prognosis for POC: [x] Good [] Fair  [] Poor      Patient requires continued skilled intervention: [x] Yes  [] No    Treatment/Activity Tolerance:  [x] Patient able to complete treatment  [x] Patient limited by fatigue  [x] Patient limited by pain     [] Patient limited by other medical complications  [] Other: Pt bhumika tx fair. She demo fair/poor endurance requiring rest breaks t/o tx. Pt was able to get full ext with max OP today. I have reached out to another company to see if she may get better coverage for a different ROM device. I did inform her of this. Of note, pt did arrive 15 minutes late. Tx was abbreviated due to this. Pt would continue to benefit from skilled PT to improve ROM, strength, pain, and function. PLAN: If pt doesn't return, this note can be considered a D/C note. Consider seated marches again.    [x] Continue per plan of care [] Alter current plan (see comments above)  [] Plan of care initiated [] Hold pending MD visit [] Discharge    Electronically signed by: Zenia Cooley DPT  [id number]

## 2021-12-22 ENCOUNTER — HOSPITAL ENCOUNTER (OUTPATIENT)
Dept: PHYSICAL THERAPY | Age: 56
Setting detail: THERAPIES SERIES
Discharge: HOME | End: 2021-12-22
Payer: COMMERCIAL

## 2021-12-22 PROCEDURE — 97140 MANUAL THERAPY 1/> REGIONS: CPT | Performed by: PHYSICAL THERAPIST

## 2021-12-22 PROCEDURE — 97110 THERAPEUTIC EXERCISES: CPT | Performed by: PHYSICAL THERAPIST

## 2021-12-22 PROCEDURE — 97530 THERAPEUTIC ACTIVITIES: CPT | Performed by: PHYSICAL THERAPIST

## 2021-12-22 NOTE — FLOWSHEET NOTE
[facility] Performance [facility] a Department of [address], [address]  Office: [phone]  Fax:  [phone]                                                                  Physical Therapy Treatment Note/ Progress Report:             Date:  2021    Patient Name:  Hermelinda Denise    :  1965  MRN: [id number]  Restrictions/Precautions:    Medical/Treatment Diagnosis Information:  · Diagnosis: M17.12 (ICD-10-CM) - Primary osteoarthritis of left knee; Z96.651 (ICD-10-CM) - Status post total right knee replacement. Surgery on 10/11/21  · Treatment Diagnosis: M25.562 - left knee pain  Insurance/Certification information:  PT Insurance Information: R  Physician Information:  Referring Practitioner: Deann Gaines  Has the plan of care been signed (Y/N):        []  Yes  [x]  No     Date of Patient follow up with Physician:       Is this a Progress Report:     []  Yes  [x]  No        If Yes:  Date Range for reporting period:  Beginning 21  Ending 21    Progress report will be due (10 Rx or 30 days whichever is less): visit 25 or 10 Aaron 22          Visit # Insurance Allowable Auth Required   13 (+ approx 8 visits elsewhere) 30 []  Yes [x]  No        Functional Scale: WOMAC-77.8%   Date assessed: 2021    Latex Allergy:  [x]NO      []YES  Preferred Language for Healthcare:   [x]English       []other:      Pain level:  310    SUBJECTIVE:  She was having pains in her thigh yesterday for some reason.        OBJECTIVE: 21    Observation:     Test measurements:      Flexibility L R Comment   Hamstring      Gastroc      ITB      Quad                ROM PROM AROM Overpressure Comment    L R L R L R    Flexion 107 sheet pulls         Extension   lacking 3                               Strength L R Comment   Quad      Hamstring      Gastroc      Hip flexor      Hip ABD                      Special Test Results/Comment   Meniscal Click    Crepitus    Flexion Test    Valgus Laxity    Varus Laxity    Lachmans    Drop Back    Homans            Girth L R   Mid Patella     Suprapatellar     5cm above     15cm above       Kalyan Clinical Decision Rule for DVT    Clinical Presentation  Possible Score  Clients Score    Active cancer (within 6 months of diagnosis or receiving palliative care)  1  0   Paralysis, paresis, or recent immobilization of lower extremity  1  0   Bedridden for more than 3 days or major surgery in the last 4 weeks  1  0   Localized tenderness in the center of the posterior calf, the popliteal space, or along the femoral vein in the anterior thigh/groin  1  0   Entire lower extremity swelling  1  0   Unilateral calf swelling (more than 3 cm larger than uninvolved side)  1  0   Unilateral pitting edema  1  0   Collateral superficial veins (nonvaricose)  1  0   An alternative diagnosis is as likely (or more likely) than DVT (e.g., cellulitis, postoperative swelling, calf strain)  -2  -2   Total Points   -2     Key  · -2 to 0 Low probability of DVT 3% (95% confidence interval [CI] 1.7%-5.9%)  · 1 to 2 Moderate probability of DVT 17% (95% confidence interval [CI] 12%-23%)  · 3 or more High probability of DVT 75% (95% confidence interval [CI] 63%-84%)    Medical consultation is advised in the presence of low probability  Medical referral is required with moderate or high score. Kalyan PS, Richi LARIOS, Martínez J, et al: Value of assessment of pretest probability of deep-vein thrombosis in clinical management, Lancet 566:1542-3483, 1997. RESTRICTIONS/PRECAUTIONS: HTN managed with medications; OA; powder gloves causes itchiness and breaking out.  R TKA in 2018.      Exercises/Interventions:     Exercise/Equipment Resistance/Repititions Other comments   Stretching     Hamstring 5x:30    Hip Flexion     Medallia Calf 5x:30    Towel Pull     Piriformis                    SLR     Supine 3x 10  Quad lag   Prone     Abduction 3x 10     Adducton     SLR+          ws     Seated march     Clams                Isometrics     BB&T Men Rock Squeezes     Patellar Glides 10' with scar massage focusing on superior scar with over pressure 3x:20 in ext Propped for most of it   Medial 2'    Superior 2'    Inferior 2'         ROM     Passive     Active     Weight Shift     Hang Weights     Sheet Pulls 10x:20    Ankle Pumps     EOB ROM 10x:20    CKC     Calf raises 3x10   Wall sits    Step ups    1 leg stand Squatting 2 x 10 with cushionSit to stand from chair   CC TKE 3x10 15# Progress NV   Balance     Monster Walks     Bridging     Triple threats     Stool Scoots     PRE     Extension  RANGE:   Flexion  RANGE:        Cable Column          Leg Press  RANGE:        Bike 5' ROM    Treadmill            Access Code: Agnieszka Lyles  URL: [email or web address]  Date: 11/15/2021  Prepared by: Alessandra Essex Cessna    Exercises  Long Sitting Calf Stretch with Strap - 2 x daily - 7 x weekly - 3-5 sets - 30 hold  Seated Table Hamstring Stretch - 2 x daily - 7 x weekly - 3-5 sets - 30 hold  Supine Quadricep Sets - 2-10 x daily - 7 x weekly - 5-10 reps - 5-10 hold  Supine Ankle Pumps - 10 x daily - 7 x weekly - 3 sets - 10 reps  Supine Hip Adduction Isometric with Ball - 2 x daily - 7 x weekly - 10 reps - 5-10 hold  Supine Active Straight Leg Raise - 1-2 x daily - 7 x weekly - 1-3 sets - 5-10 reps  Hooklying Clamshell with Resistance - 2 x daily - 7 x weekly - 1-3 sets - 10 reps  Supine Heel Slide with Strap - 2 x daily - 7 x weekly - 5-10 sets - 5-10 hold             Therapeutic Exercise and NMR EXR  [x]  (73599) Provided verbal/tactile cueing for activities related to strengthening, flexibility, endurance, ROM for improvements in LE, proximal hip, and core control with self care, mobility, lifting, ambulation.   [x]  (01505) Provided verbal/tactile cueing for activities related to improving balance, coordination, kinesthetic sense, posture, motor skill, proprioception  to assist with LE, proximal hip, and core control in self care, mobility, lifting, ambulation and eccentric single leg control. NMR and Therapeutic Activities:    [x]  (14828 or 41263) Provided verbal/tactile cueing for activities related to improving balance, coordination, kinesthetic sense, posture, motor skill, proprioception and motor activation to allow for proper function of core, proximal hip and LE with self care and ADLs  [x]  (32090) Gait Re-education- Provided training and instruction to the patient for proper LE, core and proximal hip recruitment and positioning and eccentric body weight control with ambulation re-education including up and down stairs     Home Exercise Program:    [x]  (86332) Reviewed/Progressed HEP activities related to strengthening, flexibility, endurance, ROM of core, proximal hip and LE for functional self-care, mobility, lifting and ambulation/stair navigation   [x]  (78824)Reviewed/Progressed HEP activities related to improving balance, coordination, kinesthetic sense, posture, motor skill, proprioception of core, proximal hip and LE for self care, mobility, lifting, and ambulation/stair navigation      Manual Treatments:  PROM / STM / Oscillations-Mobs:  G-I, II, III, IV (PA's, Inf., Post.)  [x]  (78625) Provided manual therapy to mobilize LE, proximal hip and/or LS spine soft tissue/joints for the purpose of modulating pain, promoting relaxation,  increasing ROM, reducing/eliminating soft tissue swelling/inflammation/restriction, improving soft tissue extensibility and allowing for proper ROM for normal function with self care, mobility, lifting and ambulation. Modalities: ice 15' top and bottom. Propped for part of tx with intermittent breaks.      Charges:   Timed Code Treatment Minutes: 47'   Total Treatment Minutes: 76'     []  IONA (LOW) 85690 []  EVAL (MOD) 55628   []  EVAL (HIGH) 36021   []  RE-EVAL   [x]  EA(89130) x1    []  IONTO  []  NMR (99401) x     []  VASO  [x]  Manual (65301) x 1     []  Other:  [x]  TA x 2     []  Mech Traction (36099)  []  ES(attended) (01722)      []  ES (un) (09195):       GOALS:   Patient stated goal: decrease pain    []? ? Progressing: []?? Met: []?? Not Met: []?? Adjusted     Therapist goals for Patient:   Short Term Goals: To be achieved in: 2 weeks  1. Independent in HEP and progression per patient tolerance, in order to prevent re-injury.     []? ? Progressing: []?? Met: []?? Not Met: []?? Adjusted   2. Pt will report pain at worst less than or equal to 8/10.  []?? Progressing: []?? Met: []?? Not Met: []?? Adjusted     Long Term Goals: To be achieved in: 16 weeks  1. Pt will demo a score 40% or better for the WOMAC to assist with reaching prior level of function.   []?? Progressing: []?? Met: []?? Not Met: []?? Adjusted  2. Patient will demonstrate increased AROM to knee ext to equal to 0 and knee flex greater than or equal to 110 to allow for proper joint functioning as indicated by patients Functional Deficits.    []? ? Progressing: []?? Met: []?? Not Met: []?? Adjusted  3. Patient will demonstrate an increase in strength to hip flex, hip ABD, and knee flex and ext strength 4 to allow for proper functional mobility as indicated by patients functional deficits. []?? Progressing: []?? Met: []?? Not Met: []?? Adjusted  4. Patient will return to performing all transfers with mod I with pain less than or equal to 3/10 in order to be independent in home mobility.  []?? Progressing: []?? Met: []?? Not Met: []?? Adjusted  5. Pt will report pain at worst less than or equal to 5/10.         []? ? Progressing: []?? Met: []?? Not Met: []?? Adjusted       Overall Progression Towards Functional goals/ Treatment Progress Update:  [] Patient is progressing as expected towards functional goals listed.     [] Progression is slowed due

## 2021-12-27 ENCOUNTER — HOSPITAL ENCOUNTER (OUTPATIENT)
Dept: PHYSICAL THERAPY | Age: 56
Setting detail: THERAPIES SERIES
Discharge: HOME | End: 2021-12-27
Payer: COMMERCIAL

## 2021-12-27 PROCEDURE — 97140 MANUAL THERAPY 1/> REGIONS: CPT | Performed by: PHYSICAL THERAPIST

## 2021-12-27 PROCEDURE — 97110 THERAPEUTIC EXERCISES: CPT | Performed by: PHYSICAL THERAPIST

## 2021-12-27 PROCEDURE — 97530 THERAPEUTIC ACTIVITIES: CPT | Performed by: PHYSICAL THERAPIST

## 2021-12-27 NOTE — FLOWSHEET NOTE
100 [facility] Performance and Rehabilitation a Department of [address]  [address], [address]  Office: [phone]  Fax:  [phone]                                                                  Physical Therapy Treatment Note/ Progress Report:             Date:  2021    Patient Name:  Petrona Torrez    :  1965  MRN: [id number]  Restrictions/Precautions:    Medical/Treatment Diagnosis Information:  · Diagnosis: M17.12 (ICD-10-CM) - Primary osteoarthritis of left knee; Z96.651 (ICD-10-CM) - Status post total right knee replacement. Surgery on 10/11/21  · Treatment Diagnosis: M25.562 - left knee pain  Insurance/Certification information:  PT Insurance Information: R  Physician Information:  Referring Practitioner: Jarek Vasquez  Has the plan of care been signed (Y/N):        []  Yes  [x]  No     Date of Patient follow up with Physician:       Is this a Progress Report:     []  Yes  [x]  No        If Yes:  Date Range for reporting period:  Beginning 21  Ending 21    Progress report will be due (10 Rx or 30 days whichever is less): visit 25 or 10 Aaron 22          Visit # Insurance Allowable Auth Required   14(+ approx 8 visits elsewhere) 30 []  Yes [x]  No        Functional Scale: WOMAC-77.8%   Date assessed: 2021    Latex Allergy:  [x]NO      []YES  Preferred Language for Healthcare:   [x]English       []other:      Pain level:  3/10    SUBJECTIVE:  The patient noted that she is more sore today. Holiday weekend activities and and rain possible contributing factor.         OBJECTIVE: 21    Observation:  TTP IT Band mild to moderate    Test measurements:      Flexibility L R Comment   Hamstring      Gastroc      ITB      Quad                ROM PROM AROM Overpressure Comment    L R L R L R    Flexion 107 sheet pulls    112 mild     Extension   lacking 3 Strength L R Comment   Quad      Hamstring      Gastroc      Hip flexor      Hip ABD                      Special Test Results/Comment   Meniscal Click    Crepitus    Flexion Test    Valgus Laxity    Varus Laxity    Lachmans    Drop Back    Homans            Girth L R   Mid Patella     Suprapatellar     5cm above     15cm above       Kalyan Clinical Decision Rule for DVT    Clinical Presentation  Possible Score  Clients Score    Active cancer (within 6 months of diagnosis or receiving palliative care)  1  0   Paralysis, paresis, or recent immobilization of lower extremity  1  0   Bedridden for more than 3 days or major surgery in the last 4 weeks  1  0   Localized tenderness in the center of the posterior calf, the popliteal space, or along the femoral vein in the anterior thigh/groin  1  0   Entire lower extremity swelling  1  0   Unilateral calf swelling (more than 3 cm larger than uninvolved side)  1  0   Unilateral pitting edema  1  0   Collateral superficial veins (nonvaricose)  1  0   An alternative diagnosis is as likely (or more likely) than DVT (e.g., cellulitis, postoperative swelling, calf strain)  -2  -2   Total Points   -2     Key  · -2 to 0 Low probability of DVT 3% (95% confidence interval [CI] 1.7%-5.9%)  · 1 to 2 Moderate probability of DVT 17% (95% confidence interval [CI] 12%-23%)  · 3 or more High probability of DVT 75% (95% confidence interval [CI] 63%-84%)    Medical consultation is advised in the presence of low probability  Medical referral is required with moderate or high score. Kalyan PS, Richi DR, Martínez J, et al: Value of assessment of pretest probability of deep-vein thrombosis in clinical management, Lancet 131:8315-7292, 1997. RESTRICTIONS/PRECAUTIONS: HTN managed with medications; OA; powder gloves causes itchiness and breaking out.  R TKA in 2018.      Exercises/Interventions:     Exercise/Equipment Resistance/Repititions Other comments Stretching     Hamstring 5x:30    Hip Flexion     ITB- Rope     Grion     Quad     Inclined Calf 5x:30    Towel Pull     Piriformis                    SLR     Supine 3x 10  Quad lag   Prone     Abduction 3x 10     Adducton     SLR+          ws     Seated march     Clams                Isometrics     BB&T THYME Squeezes     Patellar Glides 10' with scar massage focusing on superior scar with over pressure 3x:20 in ext Propped for most of it   Medial 2'    Superior 2'    Inferior 2'    The stick 4' Calf / IT Band and HS   ROM     Passive     Active     Weight Shift     Hang Weights     Sheet Pulls 10x:20    Ankle Pumps     EOB ROM 10x:20    CKC     Calf raises 3x10   Wall sits    Step ups    1 leg stand Squatting 2 x 10 with cushionSit to stand from chair   CC TKE 3x10 30# 3 sec hold     Balance     Monster Walks     Bridging     Triple threats     Stool Scoots     PRE     Extension  RANGE:   Flexion  RANGE:        Cable Column          Leg Press  RANGE:        Bike 5' ROM    Treadmill            Access Code: Kim Wesley  URL: [email or web address]  Date: 11/15/2021  Prepared by: Tristen Walker    Exercises  Long Sitting Calf Stretch with Strap - 2 x daily - 7 x weekly - 3-5 sets - 30 hold  Seated Table Hamstring Stretch - 2 x daily - 7 x weekly - 3-5 sets - 30 hold  Supine Quadricep Sets - 2-10 x daily - 7 x weekly - 5-10 reps - 5-10 hold  Supine Ankle Pumps - 10 x daily - 7 x weekly - 3 sets - 10 reps  Supine Hip Adduction Isometric with Ball - 2 x daily - 7 x weekly - 10 reps - 5-10 hold  Supine Active Straight Leg Raise - 1-2 x daily - 7 x weekly - 1-3 sets - 5-10 reps  Hooklying Clamshell with Resistance - 2 x daily - 7 x weekly - 1-3 sets - 10 reps  Supine Heel Slide with Strap - 2 x daily - 7 x weekly - 5-10 sets - 5-10 hold             Therapeutic Exercise and NMR EXR  [x]  (79995) Provided verbal/tactile cueing for activities related to strengthening, flexibility, endurance, ROM for improvements in LE, proximal hip, and core control with self care, mobility, lifting, ambulation. [x]  (67477) Provided verbal/tactile cueing for activities related to improving balance, coordination, kinesthetic sense, posture, motor skill, proprioception  to assist with LE, proximal hip, and core control in self care, mobility, lifting, ambulation and eccentric single leg control. NMR and Therapeutic Activities:    [x]  (77802 or 59466) Provided verbal/tactile cueing for activities related to improving balance, coordination, kinesthetic sense, posture, motor skill, proprioception and motor activation to allow for proper function of core, proximal hip and LE with self care and ADLs  [x]  (48160) Gait Re-education- Provided training and instruction to the patient for proper LE, core and proximal hip recruitment and positioning and eccentric body weight control with ambulation re-education including up and down stairs     Home Exercise Program:    [x]  (53392) Reviewed/Progressed HEP activities related to strengthening, flexibility, endurance, ROM of core, proximal hip and LE for functional self-care, mobility, lifting and ambulation/stair navigation   [x]  (07657)Reviewed/Progressed HEP activities related to improving balance, coordination, kinesthetic sense, posture, motor skill, proprioception of core, proximal hip and LE for self care, mobility, lifting, and ambulation/stair navigation      Manual Treatments:  PROM / STM / Oscillations-Mobs:  G-I, II, III, IV (PA's, Inf., Post.)  [x]  (00398) Provided manual therapy to mobilize LE, proximal hip and/or LS spine soft tissue/joints for the purpose of modulating pain, promoting relaxation,  increasing ROM, reducing/eliminating soft tissue swelling/inflammation/restriction, improving soft tissue extensibility and allowing for proper ROM for normal function with self care, mobility, lifting and ambulation. Modalities: ice 15' top and bottom.   Propped for part of tx with intermittent breaks. Charges:   Timed Code Treatment Minutes: 47'   Total Treatment Minutes: 76'     []  EVAL (LOW) 455 1011   []  EVAL (MOD) 09121   []  EVAL (HIGH) 11806   []  RE-EVAL   [x]  WK(22751) x1    []  IONTO  []  NMR (68792) x     []  VASO  [x]  Manual (99269) x 1     []  Other:  [x]  TA x 2     []  Mech Traction (59859)  []  ES(attended) (63203)      []  ES (un) (28690):       GOALS:   Patient stated goal: decrease pain    []? ? Progressing: []?? Met: []?? Not Met: []?? Adjusted     Therapist goals for Patient:   Short Term Goals: To be achieved in: 2 weeks  1. Independent in HEP and progression per patient tolerance, in order to prevent re-injury.     []? ? Progressing: []?? Met: []?? Not Met: []?? Adjusted   2. Pt will report pain at worst less than or equal to 8/10.  []?? Progressing: []?? Met: []?? Not Met: []?? Adjusted     Long Term Goals: To be achieved in: 16 weeks  1. Pt will demo a score 40% or better for the WOMAC to assist with reaching prior level of function.   []?? Progressing: []?? Met: []?? Not Met: []?? Adjusted  2. Patient will demonstrate increased AROM to knee ext to equal to 0 and knee flex greater than or equal to 110 to allow for proper joint functioning as indicated by patients Functional Deficits.    []? ? Progressing: []?? Met: []?? Not Met: []?? Adjusted  3. Patient will demonstrate an increase in strength to hip flex, hip ABD, and knee flex and ext strength 4 to allow for proper functional mobility as indicated by patients functional deficits. []?? Progressing: []?? Met: []?? Not Met: []?? Adjusted  4. Patient will return to performing all transfers with mod I with pain less than or equal to 3/10 in order to be independent in home mobility.  []?? Progressing: []?? Met: []?? Not Met: []?? Adjusted  5. Pt will report pain at worst less than or equal to 5/10.         []? ? Progressing: []?? Met: []?? Not Met: []?? Adjusted       Overall Progression Towards Functional goals/ Treatment Progress Update:  [] Patient is progressing as expected towards functional goals listed. [] Progression is slowed due to complexities/Impairments listed. [] Progression has been slowed due to co-morbidities. [x] Plan just implemented, too soon to assess goals progression <30days   [] Goals require adjustment due to lack of progress  [] Patient is not progressing as expected and requires additional follow up with physician  [] Other    Prognosis for POC: [x] Good [] Fair  [] Poor      Patient requires continued skilled intervention: [x] Yes  [] No    Treatment/Activity Tolerance:  [x] Patient able to complete treatment  [x] Patient limited by fatigue  [x] Patient limited by pain     [] Patient limited by other medical complications  [] Other: Pt bhumika tx fair. The patient fatigues rapidly and requires freq rest breaks VC needed for pacing on activities. PLAN: If pt doesn't return, this note can be considered a D/C note. Consider seated marches again, doing balance again, and progressing PREs to pt bhumika.    [x] Continue per plan of care [] Alter current plan (see comments above)  [] Plan of care initiated [] Hold pending MD visit [] Discharge    Electronically signed by: Christine Lynn, PT, MPT

## 2021-12-28 ENCOUNTER — TELEPHONE (OUTPATIENT)
Dept: ORTHOPEDIC SURGERY | Age: 56
End: 2021-12-28

## 2021-12-28 NOTE — TELEPHONE ENCOUNTER
Working on request for medical records Saida Huitron) for 8/1/2019-8/1/2020 for Jonnathan Georges ([address] [place] [address]).     Records on desktop waiting to load into [employer]

## 2021-12-29 ENCOUNTER — TELEPHONE (OUTPATIENT)
Dept: ORTHOPEDIC SURGERY | Age: 56
End: 2021-12-29

## 2021-12-29 ENCOUNTER — HOSPITAL ENCOUNTER (OUTPATIENT)
Dept: PHYSICAL THERAPY | Age: 56
Setting detail: THERAPIES SERIES
Discharge: HOME | End: 2021-12-29
Payer: COMMERCIAL

## 2021-12-29 PROCEDURE — 97530 THERAPEUTIC ACTIVITIES: CPT | Performed by: PHYSICAL THERAPIST

## 2021-12-29 PROCEDURE — 97140 MANUAL THERAPY 1/> REGIONS: CPT | Performed by: PHYSICAL THERAPIST

## 2021-12-29 PROCEDURE — 97110 THERAPEUTIC EXERCISES: CPT | Performed by: PHYSICAL THERAPIST

## 2021-12-29 NOTE — FLOWSHEET NOTE
100 [facility] a Department of [address]  Yadi Banks [address], [address]  Office: [phone]  Fax:  [phone]                                                                  Physical Therapy Treatment Note/ Progress Report:             Date:  2021    Patient Name:  Mackenzie Ziegler    :  1965  MRN: [id number]  Restrictions/Precautions:    Medical/Treatment Diagnosis Information:  · Diagnosis: M17.12 (ICD-10-CM) - Primary osteoarthritis of left knee; Z96.651 (ICD-10-CM) - Status post total right knee replacement. Surgery on 10/11/21  · Treatment Diagnosis: M25.562 - left knee pain  Insurance/Certification information:  PT Insurance Information: UMR  Physician Information:  Referring Practitioner: Dianna Carballo  Has the plan of care been signed (Y/N):        []  Yes  [x]  No     Date of Patient follow up with Physician:       Is this a Progress Report:     []  Yes  [x]  No        If Yes:  Date Range for reporting period:  Beginning 21  Ending 21    Progress report will be due (10 Rx or 30 days whichever is less): visit 25 or 10 Aaron 22          Visit # Insurance Allowable Auth Required   15(+ approx 8 visits elsewhere) 30 []  Yes [x]  No        Functional Scale: WOMAC-77.8%   Date assessed: 2021    Latex Allergy:  [x]NO      []YES  Preferred Language for Healthcare:   [x]English       []other:      Pain level:  3/10    SUBJECTIVE:  The patient noted that she is feeling about the same. Stiffness and soreness continue to limit her ability to perform prolonged WB activities. The patient met the [employer] rep here today and he setup and review the device.          OBJECTIVE: 21    Observation:  TTP IT Band mild to moderate    Test measurements:      Flexibility L R Comment   Hamstring      Gastroc      ITB      Quad                ROM PROM AROM Overpressure Comment    L R L R L R    Flexion 107 sheet pulls    112 mild     Extension   lacking 3                               Strength L R Comment   Quad      Hamstring      Gastroc      Hip flexor      Hip ABD                      Special Test Results/Comment   Meniscal Click    Crepitus    Flexion Test    Valgus Laxity    Varus Laxity    Lachmans    Drop Back    Homans            Girth L R   Mid Patella     Suprapatellar     5cm above     15cm above       Kalyan Clinical Decision Rule for DVT    Clinical Presentation  Possible Score  Clients Score    Active cancer (within 6 months of diagnosis or receiving palliative care)  1  0   Paralysis, paresis, or recent immobilization of lower extremity  1  0   Bedridden for more than 3 days or major surgery in the last 4 weeks  1  0   Localized tenderness in the center of the posterior calf, the popliteal space, or along the femoral vein in the anterior thigh/groin  1  0   Entire lower extremity swelling  1  0   Unilateral calf swelling (more than 3 cm larger than uninvolved side)  1  0   Unilateral pitting edema  1  0   Collateral superficial veins (nonvaricose)  1  0   An alternative diagnosis is as likely (or more likely) than DVT (e.g., cellulitis, postoperative swelling, calf strain)  -2  -2   Total Points   -2     Key  · -2 to 0 Low probability of DVT 3% (95% confidence interval [CI] 1.7%-5.9%)  · 1 to 2 Moderate probability of DVT 17% (95% confidence interval [CI] 12%-23%)  · 3 or more High probability of DVT 75% (95% confidence interval [CI] 63%-84%)    Medical consultation is advised in the presence of low probability  Medical referral is required with moderate or high score. Kalyan PS, Richi DR, Martínez J, et al: Value of assessment of pretest probability of deep-vein thrombosis in clinical management, Lancet 786:0397-6475, 1997.       RESTRICTIONS/PRECAUTIONS: HTN managed with medications; OA; powder gloves causes itchiness and breaking out. Porfirio Pollard TKA in 2018. Exercises/Interventions:     Exercise/Equipment Resistance/Repititions Other comments   Stretching     Hamstring 5x:30    Hip Flexion     ITB- Rope     Grion     Quad     Inclined Calf 5x:30    Towel Pull     Piriformis                    SLR     Supine 3x 10  Quad lag   Prone     Abduction 3x 10     Adducton     SLR+          ws     Seated march     Clams                Isometrics     [employer] Squeezes     Patellar Glides 10' with scar massage focusing on superior scar with over pressure 3x:20 in ext Propped for most of it   Medial 2'    Superior 2'    Inferior 2'    The stick 4' Calf / IT Band and HS   ROM     Passive     Active     Weight Shift     Hang Weights     Sheet Pulls 10x:20    Ankle Pumps     EOB ROM 10x:20    CKC     Calf raises 3x10   Wall sits    Step ups    1 leg stand Squatting 2 x 10 with cushionSit to stand from chair   CC TKE 3x10 30# 3 sec hold     Balance     Monster Walks     Standing alternating march 2 x10    Triple threats     Stool Scoots     PRE     Extension 90-30; 3# 3 x 10  RANGE:   Flexion  RANGE:        Cable Column          Leg Press  RANGE:        Bike 7' ROM    Treadmill            Access Code: Beata Le  URL: [email or web address]  Date: 11/15/2021  Prepared by: Adrianne Walker    Exercises  Long Sitting Calf Stretch with Strap - 2 x daily - 7 x weekly - 3-5 sets - 30 hold  Seated Table Hamstring Stretch - 2 x daily - 7 x weekly - 3-5 sets - 30 hold  Supine Quadricep Sets - 2-10 x daily - 7 x weekly - 5-10 reps - 5-10 hold  Supine Ankle Pumps - 10 x daily - 7 x weekly - 3 sets - 10 reps  Supine Hip Adduction Isometric with Ball - 2 x daily - 7 x weekly - 10 reps - 5-10 hold  Supine Active Straight Leg Raise - 1-2 x daily - 7 x weekly - 1-3 sets - 5-10 reps  Hooklying Clamshell with Resistance - 2 x daily - 7 x weekly - 1-3 sets - 10 reps  Supine Heel Slide with Strap - 2 x daily - 7 x weekly - 5-10 sets - 5-10 hold             Therapeutic Exercise and NMR EXR  [x]  (65341) Provided verbal/tactile cueing for activities related to strengthening, flexibility, endurance, ROM for improvements in LE, proximal hip, and core control with self care, mobility, lifting, ambulation. [x]  (25921) Provided verbal/tactile cueing for activities related to improving balance, coordination, kinesthetic sense, posture, motor skill, proprioception  to assist with LE, proximal hip, and core control in self care, mobility, lifting, ambulation and eccentric single leg control.      NMR and Therapeutic Activities:    [x]  (28092 or 52537) Provided verbal/tactile cueing for activities related to improving balance, coordination, kinesthetic sense, posture, motor skill, proprioception and motor activation to allow for proper function of core, proximal hip and LE with self care and ADLs  [x]  (54673) Gait Re-education- Provided training and instruction to the patient for proper LE, core and proximal hip recruitment and positioning and eccentric body weight control with ambulation re-education including up and down stairs     Home Exercise Program:    [x]  (69644) Reviewed/Progressed HEP activities related to strengthening, flexibility, endurance, ROM of core, proximal hip and LE for functional self-care, mobility, lifting and ambulation/stair navigation   [x]  (62835)Reviewed/Progressed HEP activities related to improving balance, coordination, kinesthetic sense, posture, motor skill, proprioception of core, proximal hip and LE for self care, mobility, lifting, and ambulation/stair navigation      Manual Treatments:  PROM / STM / Oscillations-Mobs:  G-I, II, III, IV (PA's, Inf., Post.)  [x]  (18299) Provided manual therapy to mobilize LE, proximal hip and/or LS spine soft tissue/joints for the purpose of modulating pain, promoting relaxation,  increasing ROM, reducing/eliminating soft tissue swelling/inflammation/restriction, improving soft tissue extensibility and allowing for proper ROM for normal function with self care, mobility, lifting and ambulation. Modalities: ice 10' top and bottom. Charges:   Timed Code Treatment Minutes: 47'   Total Treatment Minutes: 72'     []  EVAL (LOW) 455 1011   []  EVAL (MOD) 20066   []  EVAL (HIGH) 90297   []  RE-EVAL   [x]  MD(74202) x1    []  IONTO  []  NMR (16353) x     []  VASO  [x]  Manual (15242) x 1     []  Other:  [x]  TA x 2     []  [employer] (12681)  []  ES(attended) (58341)      []  ES (un) (34330):       GOALS:   Patient stated goal: decrease pain    []? ? Progressing: []?? Met: []?? Not Met: []?? Adjusted     Therapist goals for Patient:   Short Term Goals: To be achieved in: 2 weeks  1. Independent in HEP and progression per patient tolerance, in order to prevent re-injury.     [x]? ? Progressing: []?? Met: []?? Not Met: []?? Adjusted   2. Pt will report pain at worst less than or equal to 8/10.  []?? Progressing: [x]? ? Met: []?? Not Met: []?? Adjusted     Long Term Goals: To be achieved in: 16 weeks  1. Pt will demo a score 40% or better for the WOMAC to assist with reaching prior level of function.   []?? Progressing: []?? Met: []?? Not Met: []?? Adjusted  2. Patient will demonstrate increased AROM to knee ext to equal to 0 and knee flex greater than or equal to 110 to allow for proper joint functioning as indicated by patients Functional Deficits.    []? ? Progressing: []?? Met: []?? Not Met: []?? Adjusted  3. Patient will demonstrate an increase in strength to hip flex, hip ABD, and knee flex and ext strength 4 to allow for proper functional mobility as indicated by patients functional deficits. []?? Progressing: []?? Met: []?? Not Met: []?? Adjusted  4. Patient will return to performing all transfers with mod I with pain less than or equal to 3/10 in order to be independent in home mobility.  []?? Progressing: []?? Met: []?? Not Met: []?? Adjusted  5. Pt will report pain at worst less than or equal to 5/10.         []? ? Progressing: []?? Met: []?? Not Met: []?? Adjusted       Overall Progression Towards Functional goals/ Treatment Progress Update:  [x] Patient is progressing as expected towards functional goals listed. [] Progression is slowed due to complexities/Impairments listed. [] Progression has been slowed due to co-morbidities. [] Plan just implemented, too soon to assess goals progression <30days   [] Goals require adjustment due to lack of progress  [] Patient is not progressing as expected and requires additional follow up with physician  [] Other    Prognosis for POC: [x] Good [] Fair  [] Poor      Patient requires continued skilled intervention: [x] Yes  [] No    Treatment/Activity Tolerance:  [x] Patient able to complete treatment  [x] Patient limited by fatigue  [x] Patient limited by pain     [] Patient limited by other medical complications  [] Other: Pt bhumika tx fair. The patient continues to present with decreased ROM due to stiffness, pain and low compliance with POC guidelines. Deconditioned state contributing to slow progression. Assess compliance and tolerance to Dynasplint NPV. PLAN: If pt doesn't return, this note can be considered a D/C note. Consider doing balance again, and progressing PREs to pt bhumika.    [x] Continue per plan of care [] Alter current plan (see comments above)  [] Plan of care initiated [] Hold pending MD visit [] Discharge    Electronically signed by: Briseida Galvez, PT, MPT

## 2022-01-04 ENCOUNTER — TELEPHONE (OUTPATIENT)
Dept: ORTHOPEDIC SURGERY | Age: 57
End: 2022-01-04

## 2022-01-04 ENCOUNTER — OFFICE VISIT (OUTPATIENT)
Dept: ORTHOPEDIC SURGERY | Age: 57
End: 2022-01-04

## 2022-01-04 VITALS — HEIGHT: 67 IN | WEIGHT: 285 LBS | BODY MASS INDEX: 44.73 KG/M2

## 2022-01-04 DIAGNOSIS — Z96.652 S/P TOTAL KNEE ARTHROPLASTY, LEFT: Primary | ICD-10-CM

## 2022-01-04 PROCEDURE — 99024 POSTOP FOLLOW-UP VISIT: CPT | Performed by: ORTHOPAEDIC SURGERY

## 2022-01-04 RX ORDER — NAPROXEN SODIUM 220 MG
220 TABLET ORAL 2 TIMES DAILY WITH MEALS
COMMUNITY

## 2022-01-04 NOTE — LETTER
[facility]  [place] [address] [address] [address]  Phone: [phone]  Fax: [phone]    Lloyd Yoo MD        January 4, 2022     Patient: Ovi Chappell   YOB: 1965   Date of Visit: 1/4/2022       To Whom It May Concern: It is my medical opinion that [place]lyn [place] may return to work on 2/1/22 to full duty with no restrictions. If you have any questions or concerns, please don't hesitate to call.     Sincerely,          Lloyd Yoo MD

## 2022-01-04 NOTE — PROGRESS NOTES
Ovi Chappell  [id number]  January 4, 2022    Chief Complaint   Patient presents with    Post-Op Check     Left TKA; DOS 10/11/21. History: The patient is now approximately 3 months status post left total knee arthroplasty. The patient rates her pain as 3-4/10. She denies any numbness or tingling. She reports no issues with her anesthesia. She was using an extension brace, but has regained her extension. The patient's  past medical history, medications, allergies,  family history, social history, and review of systems have been reviewed, and dated and are recorded in the chart. Ht 5' 7\" (1.702 m)   Wt 285 lb (129.3 kg)   BMI 44.64 kg/m²     Physical: Ms. Ovi Chappell appears well, she is in no apparent distress, she demonstrates appropriate mood & affect. She is alert and oriented to person, place and time. She has mild swelling. There is No evidence of DVT seen on physical exam. She is neurovascularly intact distally. Range of motion is from 0 degrees to 115 degrees. The incision is  clean, dry and intact and without erythema. Strength in the knee is 5/5. There is no instability with varus and valgus stressing of the knee. There is no pain with range of motion of the hips. Xrays: Three views of the left knee obtained in the office today were reviewed. The prosthesis is well aligned and there is no evidence of loosening. Impression: Status post left Total Knee Arthroplasty, Doing well postoperatively. Plan:  She will continue to work aggressively on range of motion and strengthening: Natural history and expected course discussed. Questions answered. Quad strengthening exercises. I would like the patient to continue 2 more weeks of therapy. She will return to full duty work as an [employer] on February 1. The patient will follow up with me in 3 months and we will reassess her then. At follow-up, 3 views of the left knee will be obtained.

## 2022-01-05 ENCOUNTER — HOSPITAL ENCOUNTER (OUTPATIENT)
Dept: PHYSICAL THERAPY | Age: 57
Setting detail: THERAPIES SERIES
Discharge: HOME | End: 2022-01-05

## 2022-01-05 NOTE — FLOWSHEET NOTE
100 [facility] a Department of [address]  Jo[address], [address]  Office: [phone]  Fax:  [phone]  _________________________________________________________________    Physical Therapy  Cancellation/No-show Note  Patient Name:  Ofelia Mederos  :  1965   Date:  2022  Cancelled visits to date: 1  No-shows to date: 0    For today's appointment patient:  [x]  Cancelled  []  Rescheduled appointment  []  No-show     Reason given by patient:  [x]  Patient ill  []  Conflicting appointment  []  No transportation    []  Conflict with work  []  No reason given  []  Other:     Comments:  Pt called to cancel as she is not feeling well. She states that her daughter is +COVID. She is going to get tested. If the pt does not return, this note can be considered a D/C note.      Electronically signed by:  Juan Eddy [facility]

## 2022-01-19 ENCOUNTER — TELEPHONE (OUTPATIENT)
Dept: ORTHOPEDIC SURGERY | Age: 57
End: 2022-01-19

## 2022-01-19 NOTE — TELEPHONE ENCOUNTER
General Question     Subject: PT NEEDS OBSERVATION PAPERS SENT TO [facility] FOR DATE OF SERVICE 10/11/21 AND 10/12/21.  Yashira Schrader [phone]  Patient and /or Facility Request: [address] [place] [address],Third Floor Number: [phone]

## 2022-01-21 NOTE — TELEPHONE ENCOUNTER
The patient was informed that this information was faxed earlier this week. She states they did receive it.

## 2022-01-21 NOTE — TELEPHONE ENCOUNTER
General Question     Subject: PATIENT IS CALLING ON THE STATUS OF THE OBSERVATION PAPER.     Patient:  Baljinder Hnies  Contact Number: [phone]

## 2022-05-04 ENCOUNTER — OFFICE VISIT (OUTPATIENT)
Dept: ORTHOPEDIC SURGERY | Age: 57
End: 2022-05-04
Payer: COMMERCIAL

## 2022-05-04 VITALS — WEIGHT: 285 LBS | BODY MASS INDEX: 44.73 KG/M2 | HEIGHT: 67 IN

## 2022-05-04 DIAGNOSIS — Z96.652 S/P TOTAL KNEE ARTHROPLASTY, LEFT: Primary | ICD-10-CM

## 2022-05-04 PROCEDURE — 1036F TOBACCO NON-USER: CPT | Performed by: ORTHOPAEDIC SURGERY

## 2022-05-04 PROCEDURE — G8427 DOCREV CUR MEDS BY ELIG CLIN: HCPCS | Performed by: ORTHOPAEDIC SURGERY

## 2022-05-04 PROCEDURE — 3017F COLORECTAL CA SCREEN DOC REV: CPT | Performed by: ORTHOPAEDIC SURGERY

## 2022-05-04 PROCEDURE — G8417 CALC BMI ABV UP PARAM F/U: HCPCS | Performed by: ORTHOPAEDIC SURGERY

## 2022-05-04 PROCEDURE — 99213 OFFICE O/P EST LOW 20 MIN: CPT | Performed by: ORTHOPAEDIC SURGERY

## 2022-05-04 NOTE — PROGRESS NOTES
Gerry Olivares  [id number]  May 4, 2022    Chief Complaint   Patient presents with    Follow-up     Left knee             History: The patient is now approximately 7 months status post left total knee arthroplasty. The patient returned to full duty work in early February. She did develop increasing pain over the past few weeks. She was extremely active helping some family members. She did have a right total knee arthroplasty performed in the remote past.     The patient's  past medical history, medications, allergies,  family history, social history, and review of systems have been reviewed, and dated and are recorded in the chart. Ht 5' 7\" (1.702 m)   Wt 285 lb (129.3 kg)   BMI 44.64 kg/m²     Physical: Ms. Gerry Olivares appears well, she is in no apparent distress, she demonstrates appropriate mood & affect. She is alert and oriented to person, place and time. She has mild swelling. There is No evidence of DVT seen on physical exam. She is neurovascularly intact distally. Range of motion is from 0 degrees to 120 degrees. The incision is  clean, dry and intact and without erythema. Strength in the knee is 5/5. There is no instability with varus and valgus stressing of the knee. There is no pain with range of motion of the hips. Xrays: Three views of the left knee obtained in the office today were reviewed. The prosthesis is well aligned and there is no evidence of loosening. Impression: Status post left Total Knee Arthroplasty, Doing well postoperatively. Plan:  She will continue to work aggressively on range of motion and strengthening: Natural history and expected course discussed. Questions answered. Quad strengthening exercises. She will continue taking the Naprosyn and Tylenol. She will follow-up with me in 5 months and we will reassess her then. At follow-up, 3 views of the left knee will be obtained.

## 2022-05-04 NOTE — LETTER
[facility]  [facility] [address] [address]  Phone: [phone]  Fax: [phone]    Queen Du MD        May 4, 2022     Patient: Vitor Shah   YOB: 1965   Date of Visit: 5/4/2022       To Whom It May Concern:    Abbott Class has had 2 recent knee replacements surgeries. 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 If you have any questions or concerns, please don't hesitate to call.     Sincerely,          Queen Du MD

## 2022-10-26 ENCOUNTER — OFFICE VISIT (OUTPATIENT)
Dept: ORTHOPEDIC SURGERY | Age: 57
End: 2022-10-26
Payer: MEDICAID

## 2022-10-26 VITALS — WEIGHT: 292 LBS | HEIGHT: 67 IN | BODY MASS INDEX: 45.83 KG/M2

## 2022-10-26 DIAGNOSIS — Z96.652 S/P TOTAL KNEE ARTHROPLASTY, LEFT: Primary | ICD-10-CM

## 2022-10-26 PROCEDURE — G8417 CALC BMI ABV UP PARAM F/U: HCPCS | Performed by: ORTHOPAEDIC SURGERY

## 2022-10-26 PROCEDURE — 99213 OFFICE O/P EST LOW 20 MIN: CPT | Performed by: ORTHOPAEDIC SURGERY

## 2022-10-26 PROCEDURE — G8484 FLU IMMUNIZE NO ADMIN: HCPCS | Performed by: ORTHOPAEDIC SURGERY

## 2022-10-26 PROCEDURE — G8427 DOCREV CUR MEDS BY ELIG CLIN: HCPCS | Performed by: ORTHOPAEDIC SURGERY

## 2022-10-26 PROCEDURE — 1036F TOBACCO NON-USER: CPT | Performed by: ORTHOPAEDIC SURGERY

## 2022-10-26 PROCEDURE — 3017F COLORECTAL CA SCREEN DOC REV: CPT | Performed by: ORTHOPAEDIC SURGERY

## 2022-10-26 NOTE — PROGRESS NOTES
Maricruz Crawford  [id number]  October 26, 2022    Chief Complaint   Patient presents with    Follow-up     Left knee. S/P TKA; DOS 10/11/21. History: The patient is now approximately 1 year status post left total knee arthroplasty. The patient returned to full duty work in early February. He has been doing extremely well. The patient only occasionally has pain with aggressive activities. She also has occasional pain with severe weather changes. She did have a right total knee arthroplasty performed in the remote past.     The patient's  past medical history, medications, allergies,  family history, social history, and review of systems have been reviewed, and dated and are recorded in the chart. Ht 5' 6.5\" (1.689 m)   Wt 292 lb (132.5 kg)   BMI 46.42 kg/m²     Physical: Ms. Maricruz Crawford appears well, she is in no apparent distress, she demonstrates appropriate mood & affect. She is alert and oriented to person, place and time. She has mild swelling. There is No evidence of DVT seen on physical exam. She is neurovascularly intact distally. Range of motion is from 0 degrees to 120 degrees. The incision is  clean, dry and intact and without erythema. Strength in the knee is 5/5. There is no instability with varus and valgus stressing of the knee. There is no pain with range of motion of the hips. Xrays: Three views of the left knee obtained in the office today were reviewed. The prosthesis is well aligned and there is no evidence of loosening. Impression: Status post left Total Knee Arthroplasty        Plan:  She will continue to work aggressively on range of motion and strengthening: Natural history and expected course discussed. Questions answered. Quad strengthening exercises. Patient was encouraged on a weight program.  She may take over-the-counter anti-inflammatories as needed. She will follow-up with me in 1 year and we will reassess her then.   At follow-up, 3 views of the left knee will be obtained.

## 2022-11-03 NOTE — PROGRESS NOTES
Neda Graves  <[id number]>  August 21, 2020    Chief Complaint   Patient presents with    Follow-up     Left knee           History: The patient is here in follow-up regarding her left knee. She is having severe left knee pain. The injection back in May did provide moderate relief. I did perform a right total knee arthroplasty back in January 2019. She recovered rather well. The left knee issues are affecting her daily activities. She has difficulty getting up from a seated position. She denies any numbness or tingling. The patient can no longer take anti-inflammatories as she was diagnosed with peptic ulcer disease. The patient's  past medical history, medications, allergies,  family history, social history, and review of systems have been reviewed, and dated and are recorded in the chart. Temp 97.3 °F (36.3 °C)   Ht 5' 7\" (1.702 m)   Wt 280 lb (127 kg)   BMI 43.85 kg/m²     Physical: Ms. Neda Graves appears well, she is in no apparent distress, she demonstrates appropriate mood & affect. She is alert and oriented to person, place and time. Examination of the left lower extremity reveals no pain with range of motion of the hip. She has generalized tenderness to palpation about the joint line of the left knee. Range of motion is from -1 degrees to 115 degrees. Strength is 4+ to 5/5 for all muscle groups about the left knee. There is patellofemoral crepitus with range of motion of the left knee. Varus and valgus stressing of the knees reveals no evidence of instability. There is a small effusion in the left knee. Anterior drawer and Lachman are negative bilaterally. Examination of the skin reveals no rashes, ulceration, or lesion, bilaterally in the lower extremities. Sensation to both lower extremities is grossly intact. Exam of both feet reveals pedal pulses intact and brisk cap refill. Patient is able to dorsiflex and wiggle all toes.  Deep tendon reflexes of the lower extremities are normal and symmetric. X-rays: 4 views of the left knee obtained in the office previously were extensively reviewed. There is evidence of severe osteoarthritis. The changes are most severe medially. Impression: #1 S/p right Total Knee Arthroplasty, Doing well postoperatively. #2 severe end stage osteoarthritis of the left knee    Plan: At this time, the left knee was injected under sterile conditions. After a Betadine prep of the joint, 3 cc of 0.25% Marcaine and 2 cc of 40 mg Depo-medrol were injected into the knee. The patient tolerated the injection rather well. The patient was instructed to follow up for any signs of infection. Natural history and expected course discussed. Questions answered. Reduction in offending activity. OTC analgesics as needed. The patient will follow up with me in 3 months and we will reassess her then. The patient was instructed on a home exercise program.  She was encouraged to lose weight. If the patient continues to have severe left knee pain, we will need to consider total knee arthroplasty.   The patient was given a prescription for the biomed cream. RN

## 2024-11-25 NOTE — PROGRESS NOTES
Patient had  flowmeter in room and was getting 0.2 LPM of oxygen via nasal cannula. Saturation was 93% so I removed the nasal cannula from the patient and removed the  flow meter from the room. Well Woman Visit     CC:    Chief Complaint   Patient presents with    Gyn Exam     NP/yearly, pap-21, mammo-3/1/24      Contraception     Has Liletta IUD in place, was placed in  by an OBGYN @ [facility],       HISTORY OF PRESENT ILLNESS:  Gianna is a 57 year old  female, seen today for annual gyn exam.   She has no other complaints today    Does right pain and lump on right breast.     L-IUD placed in  due to heavy periods requiring iron transusions. Underwent D&C and pathology was benign and IUD placed at that time. No bleeding since.     ROS: as noted in HPI    The patient's past medical, surgical, obstetrical, family and social histories were reviewed, and are noted in the EHR.  Additionally, medications and allergies were reviewed and updated.    Gyn History:  -LMP:   -Menarche: 12  -Menopause: 54  -Last PAP smear:   -history of abnormal pap smear: none  -h/o STIs: none  -significant OB history: 2 vaginal deliveries     PHYSICAL EXAM:  Blood pressure 133/86, pulse 76, temperature 98.2 °F (36.8 °C), resp. rate 16, height 5' 7\" (1.702 m), weight 104.3 kg (230 lb), SpO2 100%., Body mass index is 36.02 kg/m²., No LMP recorded. (Menstrual status: Intrauterine Device).  General: well developed, well nourished, in no acute distress  CV: RRR, without murmur or gallops  Lungs: CTA, bilaterally without wheezing or rales   Psych: alert and cooperative; normal mood and affect  Breast:  Symmetric.  There are no skin changes, dominant masses, nipple discharge, or axillary lymphadenopathy bilaterally. Tenderness noted to right breast at outer quadrant with palpable cyst  Abdomen: abdomen soft and non-tender without masses  Pelvic Exam:  External genitalia  appear normal  Vagina:  no palpable lesions  Cervix:   normal size and shape and no cervical motion tenderness; IUD strings seen  Uterus:   normal size, anteverted, no palpable masses, and no adnexal masses    ASSESSMENT/PLAN:    Problem List  Items Addressed This Visit    None  Visit Diagnoses       Well woman exam with routine gynecological exam    -  Primary    Cervical cancer screening        Relevant Orders    Pap Test    Mass of upper outer quadrant of right breast        Relevant Orders    US BREAST DIAGNOSTIC 4 QUADRANTS BILATERAL             -Routine gynecology exam: pap collected today    -STI Testing: declines  -Contraception: L-IUD in place; discussed removal as now s/p menopause patient would like to wait one more year    -Screening: diet and exercise counseling performed.   mammogram 3/2024   colonoscopy at age 50 - repeat in 10 years.   -had dx mammogram in march - will check right breast USN    Return in about 1 year (around 11/25/2025).    Nan Oliveira, DO

## (undated) DEVICE — BLADE RMR L41MM PAT PILOT H

## (undated) DEVICE — CHLORAPREP 26ML ORANGE

## (undated) DEVICE — SCREW BNE L48MM CONSTRN CNDYL KNEE HEX HD STEM FOR LEG NXGN
Type: IMPLANTABLE DEVICE | Site: KNEE | Status: NON-FUNCTIONAL
Removed: 2021-10-11

## (undated) DEVICE — GARMENT COMPR STD FOR 17IN CALF UNIF THER FLOTRN

## (undated) DEVICE — TOTAL KNEE: Brand: MEDLINE INDUSTRIES, INC.

## (undated) DEVICE — 3M™ STERI-DRAPE™ U-DRAPE 1015: Brand: STERI-DRAPE™

## (undated) DEVICE — DECANTER: Brand: UNBRANDED

## (undated) DEVICE — Device: Brand: POWER-FLO®

## (undated) DEVICE — CONTAINER,SPECIMEN,PNEU TUBE,3OZ,OR STRL: Brand: MEDLINE

## (undated) DEVICE — GLOVE,SURG,SENSICARE SLT,LF,PF,8.5: Brand: MEDLINE

## (undated) DEVICE — INSTRUMENT KIT ORTHOPEDIC KNEE NAVITRACK

## (undated) DEVICE — 4-PORT MANIFOLD: Brand: NEPTUNE 2

## (undated) DEVICE — NEEDLE SPNL 22GA L3.5IN BLK HUB S STL REG WALL FIT STYL W/

## (undated) DEVICE — Z DISCONTINUED USE 2716304 SUTURE STRATAFIX SPRL SZ 3-0 L12IN ABSRB UD FS-1 L24MM 3/8

## (undated) DEVICE — PAD,ABDOMINAL,8"X7.5",STERILE,LF,1/PK: Brand: MEDLINE

## (undated) DEVICE — COVER LT HNDL BLU PLAS

## (undated) DEVICE — SOLUTION IV IRRIG POUR BRL 0.9% SODIUM CHL 2F7124

## (undated) DEVICE — SUTURE VCRL SZ 1 L18IN ABSRB UD L36MM CT-1 1/2 CIR J841D

## (undated) DEVICE — DUAL CUT SAGITTAL BLADE

## (undated) DEVICE — SPONGE GZ W4XL4IN COT 12 PLY TYP VII WVN C FLD DSGN

## (undated) DEVICE — BANDAGE,ELASTIC,ESMARK,STERILE,6"X9',LF: Brand: MEDLINE

## (undated) DEVICE — COTTON UNDERCAST PADDING,CRIMPED FINISH: Brand: WEBRIL

## (undated) DEVICE — SPONGE LAP W18XL18IN WHT COT 4 PLY FLD STRUNG RADPQ DISP ST

## (undated) DEVICE — SUTURE ABSORBABLE BRAIDED 2-0 CT-1 27 IN UD VICRYL J259H

## (undated) DEVICE — BANDAGE COMPR W6INXL10YD ST M E WHITE/BEIGE

## (undated) DEVICE — DRAPE EQUIP FOR ROBOTIC UNIT ROSA DISP

## (undated) DEVICE — T-DRAPE,EXTREMITY,STERILE: Brand: MEDLINE

## (undated) DEVICE — 450 ML BOTTLE OF 0.05% CHLORHEXIDINE GLUCONATE IN 99.95% STERILE WATER FOR IRRIGATION, USP AND APPLICATOR.: Brand: IRRISEPT ANTIMICROBIAL WOUND LAVAGE

## (undated) DEVICE — ZIMMER® STERILE DISPOSABLE TOURNIQUET CUFF WITH PLC, DUAL PORT, SINGLE BLADDER, 34 IN. (86 CM)

## (undated) DEVICE — PAD,NON-ADHERENT,3X8,STERILE,LF,1/PK: Brand: MEDLINE

## (undated) DEVICE — DRAPE,REIN 53X77,STERILE: Brand: MEDLINE

## (undated) DEVICE — GLOVE SURG SZ 85 L12IN FNGR THK94MIL STD WHT LTX FREE

## (undated) DEVICE — KNEE HOLDER DISPOSABLE LINER: Brand: ALVARADO®  KNEE SUPPORT

## (undated) DEVICE — PICO SINGLE USE NEGATIVE PRESSURE WOUND THERAPY SYSTEM 10CM X 40CM  4IN. X 16IN.: Brand: PICO

## (undated) DEVICE — BANDAGE COMPR W6INXL12FT SMOOTH FOR LIMB EXSANG ESMARCH

## (undated) DEVICE — GLOVE,SURG,SENSICARE SLT,LF,PF,8: Brand: MEDLINE

## (undated) DEVICE — Z INACTIVE USE 2660664 SOLUTION IRRIG 3000ML 0.9% SOD CHL USP UROMATIC PLAS CONT

## (undated) DEVICE — SUTURE NONABSORBABLE MONOFILAMENT 4-0 FS-2 18 IN ETHILON 662H

## (undated) DEVICE — SYRINGE MED 30ML STD CLR PLAS LUERLOCK TIP N CTRL DISP

## (undated) DEVICE — ELECTRODE PT RET AD L9FT HI MOIST COND ADH HYDRGEL CORDED

## (undated) DEVICE — NEEDLE HYPO 22GA L1 1/2IN PIVOTING SHLD FOR LUERLOCK SYR

## (undated) DEVICE — SUTURE VCRL SZ 1 L27IN ABSRB UD CT-1 L36MM 1/2 CIR J261H

## (undated) DEVICE — COVER,TABLE,77X90,STERILE: Brand: MEDLINE

## (undated) DEVICE — SYRINGE IRRIG 60ML SFT PLIABLE BLB EZ TO GRP 1 HND USE W/

## (undated) DEVICE — ESMARK: Brand: DEROYAL

## (undated) DEVICE — SYSTEM SKIN CLSR 22CM DERMBND PRINEO

## (undated) DEVICE — STERILE TOTAL KNEE DRAPE PACK: Brand: CARDINAL HEALTH

## (undated) DEVICE — BLADE RMR L46MM PAT PILOT H

## (undated) DEVICE — GOWN SIRUS NONREIN XL W/TWL: Brand: MEDLINE INDUSTRIES, INC.

## (undated) DEVICE — NEEDLE SPNL L3.5IN PNK HUB S STL REG WALL FIT STYL W/ QNCKE

## (undated) DEVICE — ANTI-EMBOLISM STOCKINGS,THIGH LENGTH,LARGE-LONG-SIZE J: Brand: T.E.D.

## (undated) DEVICE — PIN FIX L150MM DIA3.2MM FLUT CAS

## (undated) DEVICE — SUTURE VCRL SZ 2-0 L18IN ABSRB UD CT-1 L36MM 1/2 CIR J839D

## (undated) DEVICE — HANDPIECE SET WITH HIGH FLOW TIP AND SUCTION TUBE: Brand: INTERPULSE

## (undated) DEVICE — PADDING CAST W4INXL4YD SPUN DACRON POLY POR SYN VERSATILE

## (undated) DEVICE — TOTAL BASIC PK

## (undated) DEVICE — RECIPROCATING BLADE, DOUBLE SIDED, OFFSET  (70.0 X 0.64 X 12.6MM)

## (undated) DEVICE — BONE SCREW 6.5X25 SELF-TAP
Type: IMPLANTABLE DEVICE | Site: KNEE | Status: NON-FUNCTIONAL
Removed: 2021-10-11

## (undated) DEVICE — KIT OR ROOM TURNOVER W/STRAP

## (undated) DEVICE — GLOVE ORANGE PI 8 1/2   MSG9085